# Patient Record
Sex: FEMALE | Race: ASIAN | NOT HISPANIC OR LATINO | Employment: OTHER | URBAN - METROPOLITAN AREA
[De-identification: names, ages, dates, MRNs, and addresses within clinical notes are randomized per-mention and may not be internally consistent; named-entity substitution may affect disease eponyms.]

---

## 2017-01-21 DIAGNOSIS — E11.9 TYPE 2 DIABETES MELLITUS WITHOUT COMPLICATION (H): ICD-10-CM

## 2017-01-21 LAB
CREAT UR-MCNC: 16 MG/DL
HBA1C MFR BLD: 6.7 % (ref 4.3–6)
MICROALBUMIN UR-MCNC: 50 MG/L
MICROALBUMIN/CREAT UR: 315 MG/G CR (ref 0–25)

## 2017-01-21 PROCEDURE — 36415 COLL VENOUS BLD VENIPUNCTURE: CPT | Performed by: INTERNAL MEDICINE

## 2017-01-21 PROCEDURE — 82043 UR ALBUMIN QUANTITATIVE: CPT | Performed by: INTERNAL MEDICINE

## 2017-01-21 PROCEDURE — 83036 HEMOGLOBIN GLYCOSYLATED A1C: CPT | Performed by: INTERNAL MEDICINE

## 2017-01-23 ENCOUNTER — OFFICE VISIT (OUTPATIENT)
Dept: INTERNAL MEDICINE | Facility: CLINIC | Age: 72
End: 2017-01-23
Payer: COMMERCIAL

## 2017-01-23 VITALS
BODY MASS INDEX: 21.07 KG/M2 | OXYGEN SATURATION: 96 % | SYSTOLIC BLOOD PRESSURE: 132 MMHG | HEIGHT: 61 IN | DIASTOLIC BLOOD PRESSURE: 64 MMHG | WEIGHT: 111.6 LBS | HEART RATE: 75 BPM | TEMPERATURE: 98.2 F

## 2017-01-23 DIAGNOSIS — M17.0 PRIMARY OSTEOARTHRITIS OF BOTH KNEES: ICD-10-CM

## 2017-01-23 DIAGNOSIS — R80.9 TYPE 2 DIABETES MELLITUS WITH MICROALBUMINURIA, WITHOUT LONG-TERM CURRENT USE OF INSULIN (H): Primary | ICD-10-CM

## 2017-01-23 DIAGNOSIS — E06.3 AUTOIMMUNE HYPOTHYROIDISM: ICD-10-CM

## 2017-01-23 DIAGNOSIS — E11.29 TYPE 2 DIABETES MELLITUS WITH MICROALBUMINURIA, WITHOUT LONG-TERM CURRENT USE OF INSULIN (H): Primary | ICD-10-CM

## 2017-01-23 PROCEDURE — 99214 OFFICE O/P EST MOD 30 MIN: CPT | Performed by: INTERNAL MEDICINE

## 2017-01-23 NOTE — MR AVS SNAPSHOT
After Visit Summary   1/23/2017    Melly Gan    MRN: [id number]           Patient Information     Date Of Birth          1945        Visit Information        Provider Department      1/23/2017 2:30 PM Braxton Boone MD; [facility] [facility]        Today's Diagnoses     Type 2 diabetes mellitus with microalbuminuria, without long-term current use of insulin (H)    -  1     Autoimmune hypothyroidism         Primary osteoarthritis of both knees           Care Instructions    For Osteoarthritis:  1. Try taking 2  Tablets of tylenol/acetaminophen twice daily           Follow-ups after your visit        Additional Services     ORTHOPEDICS ADULT REFERRAL       Your provider has referred you to: FMG: [facility] [phone]   [email or web address]  [facility] [place] [phone]   [email or web address]      Please be aware that coverage of these services is subject to the terms and limitations of your health insurance plan.  Call member services at your health plan with any benefit or coverage questions.      Please bring the following to your appointment:    >>   Any x-rays, CTs or MRIs which have been performed.  Contact the facility where they were done to arrange for  prior to your scheduled appointment.    >>   List of current medications   >>   This referral request   >>   Any documents/labs given to you for this referral                  Future tests that were ordered for you today     Open Future Orders        Priority Expected Expires Ordered    Basic metabolic panel Routine 7/22/2017 1/23/2018 1/23/2017    Hemoglobin A1c Routine 7/22/2017 1/23/2018 1/23/2017    ALT Routine 7/22/2017 1/23/2018 1/23/2017    Albumin Random Urine Quantitative Routine 7/23/2017 1/23/2018 1/23/2017  "   Lipid panel reflex to direct LDL Routine 2017    TSH with free T4 reflex Routine 2017            Who to contact     If you have questions or need follow up information about today's clinic visit or your schedule please contact [facility] directly at [phone].  Normal or non-critical lab and imaging results will be communicated to you by Dejamorhart, letter or phone within 4 business days after the clinic has received the results. If you do not hear from us within 7 days, please contact the clinic through Dejamorhart or phone. If you have a critical or abnormal lab result, we will notify you by phone as soon as possible.  Submit refill requests through [employer] or call your pharmacy and they will forward the refill request to us. Please allow 3 business days for your refill to be completed.          Additional Information About Your Visit        DejamorharCirclePublish Information     [employer] lets you send messages to your doctor, view your test results, renew your prescriptions, schedule appointments and more. To sign up, go to [email or web address] . Click on \"Log in\" on the left side of the screen, which will take you to the Welcome page. Then click on \"Sign up Now\" on the right side of the page.     You will be asked to enter the access code listed below, as well as some personal information. Please follow the directions to create your username and password.     Your access code is: [email or web address]  Expires: 2017  3:33 PM     Your access code will  in 90 days. If you need help or a new code, please call your [place] clinic or [phone].        Care EveryWhere ID     This is your Care EveryWhere ID. This could be used by other organizations to access your [place] medical records  [id number]        Your Vitals Were     Pulse Temperature Height BMI (Body Mass Index) Pulse Oximetry       75 98.2  F (36.8  C) (Oral) 5' 1\" (1.549 m) 21.10 kg/m2 " 96%        Blood Pressure from Last 3 Encounters:   01/23/17 132/64   09/23/16 128/60   06/24/16 120/40    Weight from Last 3 Encounters:   01/23/17 111 lb 9.6 oz (50.621 kg)   09/23/16 108 lb 11.2 oz (49.306 kg)   06/24/16 105 lb 8 oz (47.854 kg)              We Performed the Following     ORTHOPEDICS ADULT REFERRAL        Primary Care Provider Office Phone # Fax #    Braxton Boone [phone]       [facility] [address] [facility] [address]        Thank you!     Thank you for choosing [facility]  for your care. Our goal is always to provide you with excellent care. Hearing back from our patients is one way we can continue to improve our services. Please take a few minutes to complete the written survey that you may receive in the mail after your visit with us. Thank you!             Your Updated Medication List - Protect others around you: Learn how to safely use, store and throw away your medicines at www.disposemymeds.org.          This list is accurate as of: 1/23/17  3:33 PM.  Always use your most recent med list.                   Brand Name Dispense Instructions for use    albuterol 108 (90 BASE) MCG/ACT Inhaler    PROAIR HFA/PROVENTIL HFA/VENTOLIN HFA    1 Inhaler    Inhale 2 puffs into the lungs every 6 hours as needed for shortness of breath / dyspnea or wheezing       amLODIPine 10 MG tablet    NORVASC    90 tablet    Take 1 tablet (10 mg) by mouth daily       aspirin 81 MG tablet      Take  by mouth daily.       calcium 600 MG tablet      Take 1 tablet by mouth 2 times daily       cholecalciferol 1000 UNIT tablet    vitamin D     5 TABLETS DAILY       fluticasone 110 MCG/ACT Inhaler    FLOVENT HFA    3 Inhaler    Inhale 1-2 puffs into the lungs 2 times daily       hydrocortisone 0.2 % cream    WESTCORT    45 g    Apply sparingly to affected area two times daily as needed.       levothyroxine 88 MCG tablet    SYNTHROID/LEVOTHROID    90  tablet    Take 1 tablet (88 mcg) by mouth daily       losartan 100 MG tablet    COZAAR    90 tablet    Take 1 tablet (100 mg) by mouth daily       metFORMIN 500 MG 24 hr tablet    GLUCOPHAGE-XR    360 tablet    Take 4 tablets (2,000 mg) by mouth daily (with breakfast) (OR 2 TABLETS IN AM AND 2 IN PM)       MULTIVITAMIN TABS   OR      1 TABLET DAILY       simvastatin 10 MG tablet    ZOCOR    90 tablet    Take 1 tablet (10 mg) by mouth At Bedtime       traZODone 100 MG tablet    DESYREL    90 tablet    Take 1 tablet (100 mg) by mouth nightly as needed for sleep

## 2017-01-23 NOTE — PROGRESS NOTES
"  SUBJECTIVE:                                                    Gregoria Gan is a 71 year old female who presents to clinic today for the following health issues:    Diabetes Follow-up  A1C      6.7   1/21/2017  A1C      6.8   6/21/2016  A1C      6.7   10/24/2015  A1C      6.8   3/21/2015  A1C      6.9   8/9/2014   Patient is checking blood sugars: once daily.  Results are as follows:         am - 86 - 120    Diabetic concerns: None     Symptoms of hypoglycemia (low blood sugar): none     Paresthesias (numbness or burning in feet) or sores: No     Date of last diabetic eye exam:  A year ago       Amount of exercise or physical activity: None    Problems taking medications regularly: No    Medication side effects: none    Diet: regular (no restrictions)    She also states she felt SOB for approx 2 wks, starting 3 wks ago and resolving 1 wk ago. She isn't able to tell me much more than this.  No infectious sx. No cough, no exertional symptoms as well.    Problem list and histories reviewed & adjusted, as indicated.  Additional history: as documented    Problem list, Medication list, Allergies, and Medical/Social/Surgical histories reviewed in EPIC and updated as appropriate.    ROS:  Constitutional, HEENT, cardiovascular, pulmonary, gi and gu systems are negative, except as otherwise noted.    OBJECTIVE:                                                    /64 mmHg  Pulse 75  Temp(Src) 98.2  F (36.8  C) (Oral)  Ht 5' 1\" (1.549 m)  Wt 111 lb 9.6 oz (50.621 kg)  BMI 21.10 kg/m2  SpO2 96%  Body mass index is 21.1 kg/(m^2).  GENERAL APPEARANCE: alert and no distress  HENT: nose and mouth without ulcers or lesions  NECK: no adenopathy, no asymmetry, masses, or scars and thyroid normal to palpation  RESP: lungs clear to auscultation - no rales, rhonchi or wheezes  CV: regular rates and rhythm, normal S1 S2, no S3 or S4 and no murmur, click or rub  MS: extremities normal- no gross deformities noted. Pain in " both knees with movement  SKIN: no suspicious lesions or rashes  DIABETIC FOOT EXAM: normal DP and PT pulses, no trophic changes or ulcerative lesions, normal sensory exam and normal monofilament exam    Diagnostic test results:  Results for orders placed or performed in visit on 01/21/17   Hemoglobin A1c   Result Value Ref Range    Hemoglobin A1C 6.7 (H) 4.3 - 6.0 %   Microalbumin quantitative random urine   Result Value Ref Range    Creatinine Urine 16 mg/dL    Albumin Urine mg/L 50 mg/L    Albumin Urine mg/g Cr 315.00 (H) 0 - 25 mg/g Cr        TSH   Date Value Ref Range Status   06/24/2016 0.47 0.40 - 4.00 mU/L Final   ]   ASSESSMENT/PLAN:                                                    1. Type 2 diabetes mellitus with microalbuminuria, without long-term current use of insulin (H)  Control is stable. Continue w/present regimen /plan   Monitor BP. Continue aCEi   - Basic metabolic panel; Future  - Hemoglobin A1c; Future  - ALT; Future  - Albumin Random Urine Quantitative; Future  - Lipid panel reflex to direct LDL; Future    2. Autoimmune hypothyroidism  Continue current dose  - TSH with free T4 reflex; Future    3. Primary osteoarthritis of both knees  - ORTHOPEDICS ADULT REFERRAL      See Patient Instructions    Braxton Boone MD  [facility]

## 2017-01-23 NOTE — NURSING NOTE
"Chief Complaint   Patient presents with     Diabetes       Initial /64 mmHg  Pulse 75  Temp(Src) 98.2  F (36.8  C) (Oral)  Ht 5' 1\" (1.549 m)  Wt 111 lb 9.6 oz (50.621 kg)  BMI 21.10 kg/m2  SpO2 96% Estimated body mass index is 21.1 kg/(m^2) as calculated from the following:    Height as of this encounter: 5' 1\" (1.549 m).    Weight as of this encounter: 111 lb 9.6 oz (50.621 kg).  BP completed using cuff size: regular    "

## 2017-03-07 DIAGNOSIS — E11.9 TYPE 2 DIABETES MELLITUS WITHOUT COMPLICATION (H): ICD-10-CM

## 2017-03-08 RX ORDER — METFORMIN HCL 500 MG
2000 TABLET, EXTENDED RELEASE 24 HR ORAL
Qty: 360 TABLET | Refills: 1 | Status: SHIPPED | OUTPATIENT
Start: 2017-03-08 | End: 2017-09-04

## 2017-03-08 NOTE — TELEPHONE ENCOUNTER
metFORMIN (GLUCOPHAGE-XR) 500 MG          Last Written Prescription Date: 8/22/16  Last Fill Quantity: 360, # refills: 1  [place] [facility] or [facility] prescribing provider:  1/23/17        BP Readings from Last 3 Encounters:   01/23/17 132/64   09/23/16 128/60   06/24/16 120/40     Lab Results   Component Value Date    MICROL 50 01/21/2017     No results found for: MICROALBUMIN  Creatinine   Date Value Ref Range Status   06/21/2016 1.18 (H) 0.52 - 1.04 mg/dL Final   ]  GFR Estimate   Date Value Ref Range Status   06/21/2016 45 (L) >60 mL/min/1.7m2 Final     Comment:     Non  GFR Calc   03/21/2015 56 (L) >60 mL/min/1.7m2 Final     Comment:     Non  GFR Calc   02/08/2014 47 (L) >60 mL/min/1.7m2 Final     GFR Estimate If Black   Date Value Ref Range Status   06/21/2016 54 (L) >60 mL/min/1.7m2 Final     Comment:      GFR Calc   03/21/2015 68 >60 mL/min/1.7m2 Final     Comment:      GFR Calc   02/08/2014 57 (L) >60 mL/min/1.7m2 Final     Lab Results   Component Value Date    CHOL 128 06/21/2016     Lab Results   Component Value Date    HDL 45 06/21/2016     Lab Results   Component Value Date    LDL 31 06/21/2016     Lab Results   Component Value Date    TRIG 258 06/21/2016     Lab Results   Component Value Date    CHOLHDLRATIO 2.8 03/21/2015     Lab Results   Component Value Date    AST 40 02/02/2013     Lab Results   Component Value Date    ALT 29 06/21/2016     Lab Results   Component Value Date    A1C 6.7 01/21/2017    A1C 6.8 06/21/2016    A1C 6.7 10/24/2015    A1C 6.8 03/21/2015    A1C 6.9 08/09/2014     Potassium   Date Value Ref Range Status   06/21/2016 4.3 3.4 - 5.3 mmol/L Final

## 2017-03-28 ENCOUNTER — OFFICE VISIT (OUTPATIENT)
Dept: INTERNAL MEDICINE | Facility: CLINIC | Age: 72
End: 2017-03-28
Payer: COMMERCIAL

## 2017-03-28 VITALS
OXYGEN SATURATION: 96 % | SYSTOLIC BLOOD PRESSURE: 124 MMHG | WEIGHT: 110.4 LBS | HEART RATE: 78 BPM | TEMPERATURE: 98 F | BODY MASS INDEX: 20.86 KG/M2 | DIASTOLIC BLOOD PRESSURE: 56 MMHG

## 2017-03-28 DIAGNOSIS — R21 MACULOPAPULAR RASH: Primary | ICD-10-CM

## 2017-03-28 PROCEDURE — 99213 OFFICE O/P EST LOW 20 MIN: CPT | Performed by: INTERNAL MEDICINE

## 2017-03-28 NOTE — NURSING NOTE
"Chief Complaint   Patient presents with     Derm Problem       Initial /56  Pulse 78  Temp 98  F (36.7  C) (Oral)  Wt 110 lb 6.4 oz (50.1 kg)  SpO2 96%  BMI 20.86 kg/m2 Estimated body mass index is 20.86 kg/(m^2) as calculated from the following:    Height as of 1/23/17: 5' 1\" (1.549 m).    Weight as of this encounter: 110 lb 6.4 oz (50.1 kg).  Medication Reconciliation: complete    "

## 2017-03-28 NOTE — MR AVS SNAPSHOT
After Visit Summary   3/28/2017    Melly Gan    MRN: [id number]           Patient Information     Date Of Birth          1945        Visit Information        Provider Department      3/28/2017 9:30 AM Braxton Boone MD; [facility] [employer] [facility]        Today's Diagnoses     Maculopapular rash    -  1       Follow-ups after your visit        Additional Services     DERMATOLOGY REFERRAL       Your provider has referred you to: FMG: [facility] [phone]   [email or web address]    Please be aware that coverage of these services is subject to the terms and limitations of your health insurance plan.  Call member services at your health plan with any benefit or coverage questions.      Please bring the following with you to your appointment:    (1) Any X-Rays, CTs or MRIs which have been performed.  Contact the facility where they were done to arrange for  prior to your scheduled appointment.    (2) List of current medications  (3) This referral request   (4) Any documents/labs given to you for this referral                  Your next 10 appointments already scheduled     Jul 20, 2017  9:30 AM CDT   LAB with [facility]   [facility]    [address] [address] [phone]           Patient must bring picture ID.  Patient should be prepared to give a urine specimen  Please do not eat 10-12 hours before your appointment if you are coming in fasting for labs on lipids, cholesterol, or glucose (sugar).  Pregnant women should follow their Care Team instructions. Water with medications is okay. Do not drink coffee or other fluids.   If you have concerns about taking  your medications, please ask at office or if scheduling via [employer], send a message by clicking on Secure Messaging, Message Your Care  "Team.            2017  2:40 PM CDT   Office Visit with Braxton Boone MD   [facility]    [address] [address] [phone]           Bring a current list of meds and any records pertaining to this visit.  For Physicals, please bring immunization records and any forms needing to be filled out.  Please arrive 10 minutes early to complete paperwork.              Who to contact     If you have questions or need follow up information about today's clinic visit or your schedule please contact [facility] directly at [phone].  Normal or non-critical lab and imaging results will be communicated to you by MyChart, letter or phone within 4 business days after the clinic has received the results. If you do not hear from us within 7 days, please contact the clinic through [employer] or phone. If you have a critical or abnormal lab result, we will notify you by phone as soon as possible.  Submit refill requests through Zingku or call your pharmacy and they will forward the refill request to us. Please allow 3 business days for your refill to be completed.          Additional Information About Your Visit        MyChart Information     Zingku lets you send messages to your doctor, view your test results, renew your prescriptions, schedule appointments and more. To sign up, go to [email or web address] . Click on \"Log in\" on the left side of the screen, which will take you to the Welcome page. Then click on \"Sign up Now\" on the right side of the page.     You will be asked to enter the access code listed below, as well as some personal information. Please follow the directions to create your username and password.     Your access code is: [email or web address]  Expires: 2017  4:33 PM     Your access code will  in 90 days. If you need help or a new code, please call your [facility] or " [phone].        Care EveryWhere ID     This is your Care EveryWhere ID. This could be used by other organizations to access your [place] medical records  [id number]        Your Vitals Were     Pulse Temperature Pulse Oximetry BMI (Body Mass Index)          78 98  F (36.7  C) (Oral) 96% 20.86 kg/m2         Blood Pressure from Last 3 Encounters:   03/28/17 124/56   01/23/17 132/64   09/23/16 128/60    Weight from Last 3 Encounters:   03/28/17 110 lb 6.4 oz (50.1 kg)   01/23/17 111 lb 9.6 oz (50.6 kg)   09/23/16 108 lb 11.2 oz (49.3 kg)              We Performed the Following     DERMATOLOGY REFERRAL          Today's Medication Changes          These changes are accurate as of: 3/28/17 10:23 AM.  If you have any questions, ask your nurse or doctor.               Stop taking these medicines if you haven't already. Please contact your care team if you have questions.     albuterol 108 (90 BASE) MCG/ACT Inhaler   Commonly known as:  PROAIR HFA/PROVENTIL HFA/VENTOLIN HFA   Stopped by:  Braxton Boone MD           fluticasone 110 MCG/ACT Inhaler   Commonly known as:  FLOVENT HFA   Stopped by:  Braxton Boone MD           hydrocortisone 0.2 % cream   Commonly known as:  WESTCORT   Stopped by:  Braxton Boone MD                    Primary Care Provider Office Phone # Fax #    Braxton Boone [phone]       [facility] [address] [facility] [address]        Thank you!     Thank you for choosing [facility]  for your care. Our goal is always to provide you with excellent care. Hearing back from our patients is one way we can continue to improve our services. Please take a few minutes to complete the written survey that you may receive in the mail after your visit with us. Thank you!             Your Updated Medication List - Protect others around you: Learn how to safely use, store and throw away your medicines at www.disposemymeds.org.           This list is accurate as of: 3/28/17 10:23 AM.  Always use your most recent med list.                   Brand Name Dispense Instructions for use    amLODIPine 10 MG tablet    NORVASC    90 tablet    Take 1 tablet (10 mg) by mouth daily       aspirin 81 MG tablet      Take  by mouth daily.       calcium 600 MG tablet      Take 1 tablet by mouth 2 times daily       cholecalciferol 1000 UNIT tablet    vitamin D     5 TABLETS DAILY       levothyroxine 88 MCG tablet    SYNTHROID/LEVOTHROID    90 tablet    Take 1 tablet (88 mcg) by mouth daily       losartan 100 MG tablet    COZAAR    90 tablet    Take 1 tablet (100 mg) by mouth daily       metFORMIN 500 MG 24 hr tablet    GLUCOPHAGE-XR    360 tablet    Take 4 tablets (2,000 mg) by mouth daily (with breakfast) (OR 2 TABLETS IN AM AND 2 IN PM)       MULTIVITAMIN TABS   OR      1 TABLET DAILY       simvastatin 10 MG tablet    ZOCOR    90 tablet    Take 1 tablet (10 mg) by mouth At Bedtime       traZODone 100 MG tablet    DESYREL    90 tablet    Take 1 tablet (100 mg) by mouth nightly as needed for sleep

## 2017-03-28 NOTE — PROGRESS NOTES
SUBJECTIVE:                                                    Gregoria Gan is a 72 year old female who presents to clinic today for the following health issues:    Rash     Onset: 3 weeks    Description:   Location: legs  Character: round, blotchy  Itching (Pruritis): no     Progression of Symptoms:  same    Accompanying Signs & Symptoms:  Fever: no   Body aches or joint pain: no   Sore throat symptoms: no   Recent cold symptoms: no    History:   Previous similar rash: no     Precipitating factors:   Exposure to similar rash: no   New exposures: None   Recent travel: no     Alleviating factors:  none     Therapies Tried and outcome: tried allergy medication for 3 days but didn't help      Problem list and histories reviewed & adjusted, as indicated.  Additional history: as documented    Labs reviewed in EPIC    Reviewed and updated as needed this visit by clinical staff  Allergies  Meds       Reviewed and updated as needed this visit by Provider       ROS:  Constitutional, HEENT, cardiovascular, pulmonary, gi and gu systems are negative, except as otherwise noted.    OBJECTIVE:                                                    /56  Pulse 78  Temp 98  F (36.7  C) (Oral)  Wt 110 lb 6.4 oz (50.1 kg)  SpO2 96%  BMI 20.86 kg/m2  Body mass index is 20.86 kg/(m^2).  GENERAL APPEARANCE: healthy, alert and no distress  SKIN:  Very faint macular spots on lower legs, keren with pressure.   Diagnostic test results:  none      ASSESSMENT/PLAN:                                                    1. Maculopapular rash  She reports these are becoming more faint and are not symptomatic. Therefore I encouraged her to jsut monitor for a month and then if still around she could ocnsider seeing derm  - DERMATOLOGY REFERRAL      Follow up with Provider - as prev scheduled     Braxton Boone MD  [facility]

## 2017-04-27 ENCOUNTER — OFFICE VISIT (OUTPATIENT)
Dept: DERMATOLOGY | Facility: CLINIC | Age: 72
End: 2017-04-27
Payer: COMMERCIAL

## 2017-04-27 VITALS — SYSTOLIC BLOOD PRESSURE: 135 MMHG | DIASTOLIC BLOOD PRESSURE: 60 MMHG | HEART RATE: 77 BPM | OXYGEN SATURATION: 100 %

## 2017-04-27 DIAGNOSIS — D48.5 NEOPLASM OF UNCERTAIN BEHAVIOR OF SKIN: ICD-10-CM

## 2017-04-27 DIAGNOSIS — L30.9 ACUTE DERMATITIS: Primary | ICD-10-CM

## 2017-04-27 PROCEDURE — 11100 HC BIOPSY SKIN/SUBQ/MUC MEM, SINGLE LESION: CPT | Performed by: PHYSICIAN ASSISTANT

## 2017-04-27 PROCEDURE — 88305 TISSUE EXAM BY PATHOLOGIST: CPT | Performed by: PHYSICIAN ASSISTANT

## 2017-04-27 PROCEDURE — 99202 OFFICE O/P NEW SF 15 MIN: CPT | Mod: 25 | Performed by: PHYSICIAN ASSISTANT

## 2017-04-27 PROCEDURE — 00000093 ZZHCL STATISTIC COURTESY CONSULT: Performed by: PHYSICIAN ASSISTANT

## 2017-04-27 RX ORDER — TRIAMCINOLONE ACETONIDE 1 MG/G
CREAM TOPICAL
Qty: 454 G | Refills: 1 | Status: SHIPPED | OUTPATIENT
Start: 2017-04-27 | End: 2017-05-25

## 2017-04-27 RX ORDER — PREDNISONE 10 MG/1
TABLET ORAL
Qty: 15 TABLET | Refills: 0 | Status: SHIPPED | OUTPATIENT
Start: 2017-04-27 | End: 2017-07-24

## 2017-04-27 NOTE — MR AVS SNAPSHOT
After Visit Summary   4/27/2017    Melly Gan    MRN: [id number]           Patient Information     Date Of Birth          1945        Visit Information        Provider Department      4/27/2017 10:30 AM Sera Saravia PA-C; [employer] [employer] [facility]        Today's Diagnoses     Acute dermatitis    -  1      Care Instructions      Wound Care Instructions     FOR SUPERFICIAL WOUNDS     [facility] [phone]                       AFTER 24 HOURS YOU SHOULD REMOVE THE BANDAGE AND BEGIN DAILY DRESSING CHANGES AS FOLLOWS:     1) Remove Dressing.     2) Clean and dry the area with tap water using a Q-tip or sterile gauze pad.     3) Apply Vaseline, Aquaphor, Polysporin ointment or Bacitracin ointment over entire wound.  Do NOT use Neosporin ointment.     4) Cover the wound with a band-aid, or a sterile non-stick gauze pad and micropore paper tape      REPEAT THESE INSTRUCTIONS AT LEAST ONCE A DAY UNTIL THE WOUND HAS COMPLETELY HEALED.    It is an old wives tale that a wound heals better when it is exposed to air and allowed to dry out. The wound will heal faster with a better cosmetic result if it is kept moist with ointment and covered with a bandage.    **Do not let the wound dry out.**      Supplies Needed:      *Cotton tipped applicators (Q-tips)    *Polysporin Ointment or Bacitracin Ointment (NOT NEOSPORIN)    *Band-aids or non-stick gauze pads and micropore paper tape.      PATIENT INFORMATION:    During the healing process you will notice a number of changes. All wounds develop a small halo of redness surrounding the wound.  This means healing is occurring. Severe itching with extensive redness usually indicates sensitivity to the ointment or bandage tape used to dress the wound.  You should call our office if this develops.      Swelling  and/or discoloration around your surgical site is common, particularly when performed around  the eye.    All wounds normally drain.  The larger the wound the more drainage there will be.  After 7-10 days, you will notice the wound beginning to shrink and new skin will begin to grow.  The wound is healed when you can see skin has formed over the entire area.  A healed wound has a healthy, shiny look to the surface and is red to dark pink in color to normalize.  Wounds may take approximately 4-6 weeks to heal.  Larger wounds may take 6-8 weeks.  After the wound is healed you may discontinue dressing changes.    You may experience a sensation of tightness as your wound heals. This is normal and will gradually subside.    Your healed wound may be sensitive to temperature changes. This sensitivity improves with time, but if you re having a lot of discomfort, try to avoid temperature extremes.    Patients frequently experience itching after their wound appears to have healed because of the continue healing under the skin.  Plain Vaseline will help relieve the itching.        POSSIBLE COMPLICATIONS    BLEEDIN. Leave the bandage in place.  2. Use tightly rolled up gauze or a cloth to apply direct pressure over the bandage for 30  minutes.  3. Reapply pressure for an additional 30 minutes if necessary  4. Use additional gauze and tape to maintain pressure once the bleeding has stopped.          Follow-ups after your visit        Your next 10 appointments already scheduled     2017  9:30 AM CDT   [facility]    [address] [address] [phone]           Patient must bring picture ID.  Patient should be prepared to give a urine specimen  Please do not eat 10-12 hours before your appointment if you are coming in fasting for labs on lipids, cholesterol, or glucose (sugar).  Pregnant women should follow their Care Team instructions. Water with medications is okay. Do not drink coffee or other  "fluids.   If you have concerns about taking  your medications, please ask at office or if scheduling via [employer], send a message by clicking on Secure Messaging, Message Your Care Team.            Jul 24, 2017  2:40 PM CDT   [facility] with Braxton Boone [facility]   [facility]    [address] [id number] [phone]           Bring a current list of meds and any records pertaining to this visit.  For Physicals, please bring immunization records and any forms needing to be filled out.  Please arrive 10 minutes early to complete paperwork.              Who to contact     If you have questions or need follow up information about today's clinic visit or your schedule please contact [facility] directly at [phone].  Normal or non-critical lab and imaging results will be communicated to you by MyChart, letter or phone within 4 business days after the clinic has received the results. If you do not hear from us within 7 days, please contact the clinic through [employer] or phone. If you have a critical or abnormal lab result, we will notify you by phone as soon as possible.  Submit refill requests through [employer] or call your pharmacy and they will forward the refill request to us. Please allow 3 business days for your refill to be completed.          Additional Information About Your Visit        [employer] Information     [employer] lets you send messages to your doctor, view your test results, renew your prescriptions, schedule appointments and more. To sign up, go to [email or web address] . Click on \"Log in\" on the left side of the screen, which will take you to the Welcome page. Then click on \"Sign up Now\" on the right side of the page.     You will be asked to enter the access code listed below, as well as some personal information. Please follow the directions to create your username and password.     Your " access code is: [email or web address]  Expires: 2017 11:18 AM     Your access code will  in 90 days. If you need help or a new code, please call your [place] clinic or [phone].        Care EveryWhere ID     This is your Care EveryWhere ID. This could be used by other organizations to access your [place] medical records  [id number]        Your Vitals Were     Pulse Pulse Oximetry                77 100%           Blood Pressure from Last 3 Encounters:   17 135/60   17 124/56   17 132/64    Weight from Last 3 Encounters:   17 50.1 kg (110 lb 6.4 oz)   17 50.6 kg (111 lb 9.6 oz)   16 49.3 kg (108 lb 11.2 oz)              Today, you had the following     No orders found for display         Today's Medication Changes          These changes are accurate as of: 17 11:18 AM.  If you have any questions, ask your nurse or doctor.               Start taking these medicines.        Dose/Directions    predniSONE 10 MG tablet   Commonly known as:  DELTASONE   Used for:  Acute dermatitis   Started by:  Sera Saravia PA-C        3 tabs PO QAM x 5 days   Quantity:  15 tablet   Refills:  0       triamcinolone 0.1 % cream   Commonly known as:  KENALOG   Used for:  Acute dermatitis   Started by:  Sera Saravia PA-C        Apply to legs BID x 1-2 weeks then PRN   Quantity:  454 g   Refills:  1            Where to get your medicines      These medications were sent to [id number] - [facility] [address] [place] [address]     Phone:  [phone]     predniSONE 10 MG tablet    triamcinolone 0.1 % cream                Primary Care Provider Office Phone # Fax #    Braxton Boone [phone]       [facility] [address] [facility] [address]        Thank you!     Thank you for choosing [facility]  for your care. Our goal is always to provide you with excellent  care. Hearing back from our patients is one way we can continue to improve our services. Please take a few minutes to complete the written survey that you may receive in the mail after your visit with us. Thank you!             Your Updated Medication List - Protect others around you: Learn how to safely use, store and throw away your medicines at www.disposemymeds.org.          This list is accurate as of: 4/27/17 11:18 AM.  Always use your most recent med list.                   Brand Name Dispense Instructions for use    amLODIPine 10 MG tablet    NORVASC    90 tablet    Take 1 tablet (10 mg) by mouth daily       aspirin 81 MG tablet      Take  by mouth daily.       calcium 600 MG tablet      Take 1 tablet by mouth 2 times daily       cholecalciferol 1000 UNIT tablet    vitamin D     5 TABLETS DAILY       levothyroxine 88 MCG tablet    SYNTHROID/LEVOTHROID    90 tablet    Take 1 tablet (88 mcg) by mouth daily       losartan 100 MG tablet    COZAAR    90 tablet    Take 1 tablet (100 mg) by mouth daily       metFORMIN 500 MG 24 hr tablet    GLUCOPHAGE-XR    360 tablet    Take 4 tablets (2,000 mg) by mouth daily (with breakfast) (OR 2 TABLETS IN AM AND 2 IN PM)       MULTIVITAMIN TABS   OR      1 TABLET DAILY       predniSONE 10 MG tablet    DELTASONE    15 tablet    3 tabs PO QAM x 5 days       simvastatin 10 MG tablet    ZOCOR    90 tablet    Take 1 tablet (10 mg) by mouth At Bedtime       traZODone 100 MG tablet    DESYREL    90 tablet    Take 1 tablet (100 mg) by mouth nightly as needed for sleep       triamcinolone 0.1 % cream    KENALOG    454 g    Apply to legs BID x 1-2 weeks then PRN

## 2017-04-27 NOTE — PROGRESS NOTES
HPI:   Gregoria Gan is a 72 year old female who presents for evaluation of a rash on her lower legs  chief complaint  Location: bilateral lower legs - started around her ankles and has spread proximally   Condition present for:  About 3 months.   Previous treatments include: none    Review Of Systems  Eyes: negative  Ears/Nose/Throat: negative  Respiratory: No shortness of breath, dyspnea on exertion, cough, or hemoptysis  Cardiovascular: negative  Gastrointestinal: negative  Genitourinary: negative  Musculoskeletal: negative  Neurologic: negative  Psychiatric: negative        PHYSICAL EXAM:      Skin exam performed as follows: Type 3 skin. Mood appropriate  Alert and Oriented X 3. Well developed, well nourished in no distress.  General appearance: Normal  Head including face: Normal  Eyes: conjunctiva and lids: Normal  Mouth: Lips, teeth, gums: Normal  Neck: Normal  Chest-breast/axillae: Normal  Back: Normal  Spleen and liver: Normal  Cardiovascular: Exam of peripheral vascular system by observation for swelling, varicosities, edema: Normal  Genitalia: groin, buttocks: Normal  Extremities: digits/nails (clubbing): Normal  Eccrine and Apocrine glands: Normal  Right upper extremity: Normal  Left upper extremity: Normal  Right lower extremity: Normal  Left lower extremity: Normal  Skin: Scalp and body hair: See below    1. Pink and tan round subcutaneous patches on bilateral legs    ASSESSMENT/PLAN:     1. 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 Amenable to both PO and topicals. Eruption is neither painful nor pruritic and the color in the lower lesions is starting to fade.   --Deep Shave bx in typical fashion from left upper thigh .  Area cleaned with betadyne and anesthetized with 1% lidocaine with epi .  Dermablade used to remove the lesion and sent to pathology. Bleeding was cauterized. Pt tolerated procedure well.  --Start prednisone 30 mg QAM x 5  days. Ok to take Benadryl at night if needed for sleep.   --Start TAC 0.1% cream BID x 1-2 weeks then PRN        Follow-up: 3-4 weeks/PRN sooner  CC:   Scribed By: Sera Saravia, MS, PA-C

## 2017-04-27 NOTE — NURSING NOTE
"Initial /60  Pulse 77  SpO2 100% Estimated body mass index is 20.86 kg/(m^2) as calculated from the following:    Height as of 1/23/17: 1.549 m (5' 1\").    Weight as of 3/28/17: 50.1 kg (110 lb 6.4 oz). .      "

## 2017-04-27 NOTE — PATIENT INSTRUCTIONS
Wound Care Instructions     FOR SUPERFICIAL WOUNDS     [facility] [phone]                       AFTER 24 HOURS YOU SHOULD REMOVE THE BANDAGE AND BEGIN DAILY DRESSING CHANGES AS FOLLOWS:     1) Remove Dressing.     2) Clean and dry the area with tap water using a Q-tip or sterile gauze pad.     3) Apply Vaseline, Aquaphor, Polysporin ointment or Bacitracin ointment over entire wound.  Do NOT use Neosporin ointment.     4) Cover the wound with a band-aid, or a sterile non-stick gauze pad and micropore paper tape      REPEAT THESE INSTRUCTIONS AT LEAST ONCE A DAY UNTIL THE WOUND HAS COMPLETELY HEALED.    It is an old wives tale that a wound heals better when it is exposed to air and allowed to dry out. The wound will heal faster with a better cosmetic result if it is kept moist with ointment and covered with a bandage.    **Do not let the wound dry out.**      Supplies Needed:      *Cotton tipped applicators (Q-tips)    *Polysporin Ointment or Bacitracin Ointment (NOT NEOSPORIN)    *Band-aids or non-stick gauze pads and micropore paper tape.      PATIENT INFORMATION:    During the healing process you will notice a number of changes. All wounds develop a small halo of redness surrounding the wound.  This means healing is occurring. Severe itching with extensive redness usually indicates sensitivity to the ointment or bandage tape used to dress the wound.  You should call our office if this develops.      Swelling  and/or discoloration around your surgical site is common, particularly when performed around the eye.    All wounds normally drain.  The larger the wound the more drainage there will be.  After 7-10 days, you will notice the wound beginning to shrink and new skin will begin to grow.  The wound is healed when you can see skin has formed over the entire area.  A healed wound has a healthy, shiny look to the surface and is red to dark pink in color to normalize.  Wounds may take  approximately 4-6 weeks to heal.  Larger wounds may take 6-8 weeks.  After the wound is healed you may discontinue dressing changes.    You may experience a sensation of tightness as your wound heals. This is normal and will gradually subside.    Your healed wound may be sensitive to temperature changes. This sensitivity improves with time, but if you re having a lot of discomfort, try to avoid temperature extremes.    Patients frequently experience itching after their wound appears to have healed because of the continue healing under the skin.  Plain Vaseline will help relieve the itching.        POSSIBLE COMPLICATIONS    BLEEDIN. Leave the bandage in place.  2. Use tightly rolled up gauze or a cloth to apply direct pressure over the bandage for 30  minutes.  3. Reapply pressure for an additional 30 minutes if necessary  4. Use additional gauze and tape to maintain pressure once the bleeding has stopped.

## 2017-05-02 ENCOUNTER — HOSPITAL PATHOLOGY (OUTPATIENT)
Dept: OTHER | Facility: CLINIC | Age: 72
End: 2017-05-02

## 2017-05-03 LAB — COPATH REPORT: NORMAL

## 2017-05-04 LAB — COPATH REPORT: NORMAL

## 2017-05-25 ENCOUNTER — OFFICE VISIT (OUTPATIENT)
Dept: DERMATOLOGY | Facility: CLINIC | Age: 72
End: 2017-05-25
Payer: COMMERCIAL

## 2017-05-25 VITALS — SYSTOLIC BLOOD PRESSURE: 146 MMHG | HEART RATE: 87 BPM | OXYGEN SATURATION: 97 % | DIASTOLIC BLOOD PRESSURE: 68 MMHG

## 2017-05-25 DIAGNOSIS — L23.9 DERMAL HYPERSENSITIVITY REACTION: Primary | ICD-10-CM

## 2017-05-25 DIAGNOSIS — L30.9 ACUTE DERMATITIS: ICD-10-CM

## 2017-05-25 PROCEDURE — 99212 OFFICE O/P EST SF 10 MIN: CPT | Performed by: PHYSICIAN ASSISTANT

## 2017-05-25 RX ORDER — TRIAMCINOLONE ACETONIDE 1 MG/G
CREAM TOPICAL
Qty: 454 G | Refills: 1 | Status: SHIPPED | OUTPATIENT
Start: 2017-05-25 | End: 2017-07-24

## 2017-05-25 NOTE — NURSING NOTE
"Initial /68  Pulse 87  SpO2 97% Estimated body mass index is 20.86 kg/(m^2) as calculated from the following:    Height as of 1/23/17: 1.549 m (5' 1\").    Weight as of 3/28/17: 50.1 kg (110 lb 6.4 oz). .      "

## 2017-05-25 NOTE — PROGRESS NOTES
HPI:   Gregoria Gan is a 72 year old female who presents for recheck of rash on legs - much improved on current regimen  chief complaint  Location: bilateral lower legs - started around her ankles and has spread proximally   Condition present for:  About 3 months.   Previous treatments include: TAC cream, prednisone    Review Of Systems  Eyes: negative  Ears/Nose/Throat: negative  Respiratory: No shortness of breath, dyspnea on exertion, cough, or hemoptysis  Cardiovascular: negative  Gastrointestinal: negative  Genitourinary: negative  Musculoskeletal: negative  Neurologic: negative  Psychiatric: negative        PHYSICAL EXAM:      Skin exam performed as follows: Type 3 skin. Mood appropriate  Alert and Oriented X 3. Well developed, well nourished in no distress.  General appearance: Normal  Head including face: Normal  Eyes: conjunctiva and lids: Normal  Mouth: Lips, teeth, gums: Normal  Neck: Normal  Chest-breast/axillae: Normal  Back: Normal  Spleen and liver: Normal  Cardiovascular: Exam of peripheral vascular system by observation for swelling, varicosities, edema: Normal  Genitalia: groin, buttocks: Normal  Extremities: digits/nails (clubbing): Normal  Eccrine and Apocrine glands: Normal  Right upper extremity: Normal  Left upper extremity: Normal  Right lower extremity: Normal  Left lower extremity: Normal  Skin: Scalp and body hair: See below    1. Legs clear    ASSESSMENT/PLAN:     1.   Biopsy proven dermal hypersensitivity reaction - much improved on current regimen today. Has been using TAC cream sparingly. Having allergic contact dermatitis to the bandage adhesive - advised to d/c this and to start paper tape or bandages for sensitive skin.   --Continue vaseline to biopsy site and cover with a bandage  --Stop TAC 0.1% cream for now; may resume if never needed in the future.         Follow-up: PRN  CC:   Scribed By: Sera Saravia, MS, PA-C

## 2017-05-25 NOTE — MR AVS SNAPSHOT
After Visit Summary   5/25/2017    Gregoria Gan    MRN: [id number]           Patient Information     Date Of Birth          1945        Visit Information        Provider Department      5/25/2017 2:15 PM Sera Saravia PA-C; [facility] [employer] [facility]        Today's Diagnoses     Dermal hypersensitivity reaction    -  1    Acute dermatitis           Follow-ups after your visit        Your next 10 appointments already scheduled     Jul 20, 2017  9:30 AM CDT   LAB with TRUNG LAB   [facility] ([facility])    [address] [place] [address] [phone]           Patient must bring picture ID.  Patient should be prepared to give a urine specimen  Please do not eat 10-12 hours before your appointment if you are coming in fasting for labs on lipids, cholesterol, or glucose (sugar).  Pregnant women should follow their Care Team instructions. Water with medications is okay. Do not drink coffee or other fluids.   If you have concerns about taking  your medications, please ask at office or if scheduling via [employer], send a message by clicking on Secure Messaging, Message Your Care Team.            Jul 24, 2017  2:40 PM CDT   Office Visit with Braxton Boone MD   [facility] ([facility])    [address] [place] [address] [phone]           Bring a current list of meds and any records pertaining to this visit.  For Physicals, please bring immunization records and any forms needing to be filled out.  Please arrive 10 minutes early to complete paperwork.              Who to contact     If you have questions or need follow up information about today's clinic visit or your schedule please contact [facility] directly at [phone].  Normal or non-critical lab and imaging results will be communicated  "to you by [employer], letter or phone within 4 business days after the clinic has received the results. If you do not hear from us within 7 days, please contact the clinic through [employer] or phone. If you have a critical or abnormal lab result, we will notify you by phone as soon as possible.  Submit refill requests through [employer] or call your pharmacy and they will forward the refill request to us. Please allow 3 business days for your refill to be completed.          Additional Information About Your Visit        [employer] Communications[employer]iffin Hospital[employer] Information     [employer] lets you send messages to your doctor, view your test results, renew your prescriptions, schedule appointments and more. To sign up, go to [email or web address] . Click on \"Log in\" on the left side of the screen, which will take you to the Welcome page. Then click on \"Sign up Now\" on the right side of the page.     You will be asked to enter the access code listed below, as well as some personal information. Please follow the directions to create your username and password.     Your access code is: [email or web address]  Expires: 2017 11:18 AM     Your access code will  in 90 days. If you need help or a new code, please call your [place] clinic or [phone].        Care EveryWhere ID     This is your Care EveryWhere ID. This could be used by other organizations to access your [place] medical records  [id number]        Your Vitals Were     Pulse Pulse Oximetry                87 97%           Blood Pressure from Last 3 Encounters:   17 146/68   17 135/60   17 124/56    Weight from Last 3 Encounters:   17 50.1 kg (110 lb 6.4 oz)   17 50.6 kg (111 lb 9.6 oz)   16 49.3 kg (108 lb 11.2 oz)              Today, you had the following     No orders found for display         Where to get your medicines      These medications were sent to [id number] - [place], [place] - [address]  [address] [facility] [id number]     Phone:  [phone]     triamcinolone 0.1 % cream          Primary Care Provider Office Phone # Fax #    Braxton Boone [phone]       [facility] [address]  [facility] [address]        Thank you!     Thank you for choosing [facility]  for your care. Our goal is always to provide you with excellent care. Hearing back from our patients is one way we can continue to improve our services. Please take a few minutes to complete the written survey that you may receive in the mail after your visit with us. Thank you!             Your Updated Medication List - Protect others around you: Learn how to safely use, store and throw away your medicines at www.disposemymeds.org.          This list is accurate as of: 5/25/17  2:54 PM.  Always use your most recent med list.                   Brand Name Dispense Instructions for use    amLODIPine 10 MG tablet    NORVASC    90 tablet    Take 1 tablet (10 mg) by mouth daily       aspirin 81 MG tablet      Take  by mouth daily.       calcium 600 MG tablet      Take 1 tablet by mouth 2 times daily       cholecalciferol 1000 UNIT tablet    vitamin D     5 TABLETS DAILY       levothyroxine 88 MCG tablet    SYNTHROID/LEVOTHROID    90 tablet    Take 1 tablet (88 mcg) by mouth daily       losartan 100 MG tablet    COZAAR    90 tablet    Take 1 tablet (100 mg) by mouth daily       metFORMIN 500 MG 24 hr tablet    GLUCOPHAGE-XR    360 tablet    Take 4 tablets (2,000 mg) by mouth daily (with breakfast) (OR 2 TABLETS IN AM AND 2 IN PM)       MULTIVITAMIN TABS   OR      1 TABLET DAILY       predniSONE 10 MG tablet    DELTASONE    15 tablet    3 tabs PO QAM x 5 days       simvastatin 10 MG tablet    ZOCOR    90 tablet    Take 1 tablet (10 mg) by mouth At Bedtime       traZODone 100 MG tablet    DESYREL    90 tablet    Take 1 tablet (100 mg) by mouth nightly as needed for sleep       triamcinolone 0.1 % cream    KENALOG     454 g    Apply to legs BID x 1-2 weeks then PRN

## 2017-07-04 ENCOUNTER — OFFICE VISIT (OUTPATIENT)
Dept: URGENT CARE | Facility: URGENT CARE | Age: 72
End: 2017-07-04
Payer: COMMERCIAL

## 2017-07-04 VITALS
TEMPERATURE: 97.8 F | BODY MASS INDEX: 20.6 KG/M2 | HEART RATE: 67 BPM | WEIGHT: 109 LBS | SYSTOLIC BLOOD PRESSURE: 157 MMHG | DIASTOLIC BLOOD PRESSURE: 73 MMHG

## 2017-07-04 DIAGNOSIS — B02.9 HERPES ZOSTER WITHOUT COMPLICATION: Primary | ICD-10-CM

## 2017-07-04 PROCEDURE — 99213 OFFICE O/P EST LOW 20 MIN: CPT | Performed by: PHYSICIAN ASSISTANT

## 2017-07-04 RX ORDER — VALACYCLOVIR HYDROCHLORIDE 1 G/1
1000 TABLET, FILM COATED ORAL 3 TIMES DAILY
Qty: 21 TABLET | Refills: 0 | Status: SHIPPED | OUTPATIENT
Start: 2017-07-04 | End: 2017-07-24

## 2017-07-04 NOTE — PROGRESS NOTES
HPI  Gregoria Gan is a 72 year old female who presents today with rash over her abdominal wall for the past 3 days.  Also accompanied by  today who interprets for patient.  Developed tender, mildly itchy rash over the R side of her abdominal wall 3days ago and was told by the pharmacy that this could be shingles.  Describes red blister like rash that seems to be spreading to her flank region.  No drainage or fevers.  No new soaps/lotions/detergent, no new medications or foods.  No one else in the family with similar rashes.  No URI symptoms or fevers.  Has tried OTC hydrocortisone cream with minimal relief.    Reviewed PMH.  Patient Active Problem List   Diagnosis     Autoimmune hypothyroidism     Type 2 diabetes mellitus with microalbuminuria, without long-term current use of insulin (H)     Hyperlipidemia LDL goal <100     Insomnia     CKD (chronic kidney disease) stage 3, GFR 30-59 ml/min     Advanced directives, counseling/discussion     Benign hypertension with CKD (chronic kidney disease) stage III     Current Outpatient Prescriptions   Medication Sig Dispense Refill     triamcinolone (KENALOG) 0.1 % cream Apply to legs BID x 1-2 weeks then  g 1     metFORMIN (GLUCOPHAGE-XR) 500 MG 24 hr tablet Take 4 tablets (2,000 mg) by mouth daily (with breakfast) (OR 2 TABLETS IN AM AND 2 IN PM) 360 tablet 1     simvastatin (ZOCOR) 10 MG tablet Take 1 tablet (10 mg) by mouth At Bedtime 90 tablet 3     levothyroxine (SYNTHROID, LEVOTHROID) 88 MCG tablet Take 1 tablet (88 mcg) by mouth daily 90 tablet 3     losartan (COZAAR) 100 MG tablet Take 1 tablet (100 mg) by mouth daily 90 tablet 3     amLODIPine (NORVASC) 10 MG tablet Take 1 tablet (10 mg) by mouth daily 90 tablet 3     traZODone (DESYREL) 100 MG tablet Take 1 tablet (100 mg) by mouth nightly as needed for sleep 90 tablet 3     calcium 600 MG tablet Take 1 tablet by mouth 2 times daily       aspirin 81 MG tablet Take  by mouth daily.        VITAMIN D 1000 UNIT OR TABS 5 TABLETS DAILY       MULTIVITAMIN TABS   OR 1 TABLET DAILY       predniSONE (DELTASONE) 10 MG tablet 3 tabs PO QAM x 5 days (Patient not taking: Reported on 7/4/2017) 15 tablet 0     Allergies   Allergen Reactions     No Known Drug Allergy        ROS  All other ROS are negative.      Physical Exam   Constitutional: She is oriented to person, place, and time and well-developed, well-nourished, and in no distress. No distress.   Neurological: She is alert and oriented to person, place, and time.   Skin: Skin is warm, dry and intact. Rash noted. Rash is vesicular.        2 clustered group of vesicles on erythematous base present along dermatomal pattern T9.  Mild tenderness but no drainage.    Psychiatric: Mood and affect normal.   Nursing note and vitals reviewed.      Assessment/Plan:  Herpes zoster without complication:  Rash is consistent with shingles.  Reports mild tenderness and pruritis.  Will start valacyclovir as directed.  Tylenol as needed for pain.  Educated patient on pathophysiology, course and complications of shingles.  She is considered contagious until all lesions have crusted over.  Recheck in clinic if symptoms worsen or if symptoms do not improve.     -     valACYclovir (VALTREX) 1000 mg tablet; Take 1 tablet (1,000 mg) by mouth 3 times daily          Anel See CORNELL Holley

## 2017-07-04 NOTE — NURSING NOTE
"Chief Complaint   Patient presents with     Derm Problem     itchy burning painful rash on stomach for three days.        Initial /73  Pulse 67  Temp 97.8  F (36.6  C) (Oral)  Wt 109 lb (49.4 kg)  Breastfeeding? No  BMI 20.6 kg/m2 Estimated body mass index is 20.6 kg/(m^2) as calculated from the following:    Height as of 1/23/17: 5' 1\" (1.549 m).    Weight as of this encounter: 109 lb (49.4 kg).  Medication Reconciliation: complete    "

## 2017-07-04 NOTE — MR AVS SNAPSHOT
After Visit Summary   7/4/2017    Gregoria Gan    MRN: [id number]           Patient Information     Date Of Birth          1945        Visit Information        Provider Department      7/4/2017 2:25 PM Anel Holley PA-C [facility]        Today's Diagnoses     Herpes zoster without complication    -  1       Follow-ups after your visit        Your next 10 appointments already scheduled     Jul 20, 2017  9:30 AM CDT   [facility]    [address] [place] [address] [id number] [phone]           Patient must bring picture ID.  Patient should be prepared to give a urine specimen  Please do not eat 10-12 hours before your appointment if you are coming in fasting for labs on lipids, cholesterol, or glucose (sugar).  Pregnant women should follow their Care Team instructions. Water with medications is okay. Do not drink coffee or other fluids.   If you have concerns about taking  your medications, please ask at office or if scheduling via [place], send a message by clicking on Secure Messaging, Message Your Care Team.            Jul 24, 2017  2:40 PM CDT   [facility]   [facility]    [address] [place] [id number] [phone]           Bring a current list of meds and any records pertaining to this visit.  For Physicals, please bring immunization records and any forms needing to be filled out.  Please arrive 10 minutes early to complete paperwork.              Who to contact     If you have questions or need follow up information about today's clinic visit or your schedule please contact [facility] directly at [phone].  Normal or non-critical lab and imaging results will be communicated to you by MyChart, letter or phone within 4  "business days after the clinic has received the results. If you do not hear from us within 7 days, please contact the clinic through [facility] or phone. If you have a critical or abnormal lab result, we will notify you by phone as soon as possible.  Submit refill requests through Celery or call your pharmacy and they will forward the refill request to us. Please allow 3 business days for your refill to be completed.          Additional Information About Your Visit        Celery Information     Celery lets you send messages to your doctor, view your test results, renew your prescriptions, schedule appointments and more. To sign up, go to [email or web address] . Click on \"Log in\" on the left side of the screen, which will take you to the Welcome page. Then click on \"Sign up Now\" on the right side of the page.     You will be asked to enter the access code listed below, as well as some personal information. Please follow the directions to create your username and password.     Your access code is: [email or web address]  Expires: 2017 11:18 AM     Your access code will  in 90 days. If you need help or a new code, please call your [facility] clinic or [phone].        Care EveryWhere ID     This is your Care EveryWhere ID. This could be used by other organizations to access your [facility] medical records  [id number]        Your Vitals Were     Pulse Temperature Breastfeeding? BMI (Body Mass Index)          67 97.8  F (36.6  C) (Oral) No 20.6 kg/m2         Blood Pressure from Last 3 Encounters:   17 157/73   17 146/68   17 135/60    Weight from Last 3 Encounters:   17 109 lb (49.4 kg)   17 110 lb 6.4 oz (50.1 kg)   17 111 lb 9.6 oz (50.6 kg)              Today, you had the following     No orders found for display         Today's Medication Changes          These changes are accurate as of: 17  3:31 PM.  If you have any questions, ask your nurse or doctor.             "   Start taking these medicines.        Dose/Directions    valACYclovir 1000 mg tablet   Commonly known as:  VALTREX   Used for:  Herpes zoster without complication   Started by:  Anel Holley PA-C        Dose:  1000 mg   Take 1 tablet (1,000 mg) by mouth 3 times daily   Quantity:  21 tablet   Refills:  0            Where to get your medicines      These medications were sent to [facility] [address].  [address] [address]     Phone:  [phone]     valACYclovir 1000 mg tablet                Primary Care Provider Office Phone # Fax #    Braxton Boone [phone]       [facility] [address]  [facility] [address]        Equal Access to Services     SAMANTA SWANSON : Kathi fung Sochris, waaxda luqadaha, qaybta kaalmada adeegyada, elin alas . So [facility] [phone].    ATENCIÓN: Si habla español, tiene a rob disposición servicios gratuitos de asistencia lingüística. Llame al [phone].    We comply with applicable federal civil rights laws and [place] laws. We do not discriminate on the basis of race, color, national origin, age, disability sex, sexual orientation or gender identity.            Thank you!     Thank you for choosing [facility]  for your care. Our goal is always to provide you with excellent care. Hearing back from our patients is one way we can continue to improve our services. Please take a few minutes to complete the written survey that you may receive in the mail after your visit with us. Thank you!             Your Updated Medication List - Protect others around you: Learn how to safely use, store and throw away your medicines at www.disposemymeds.org.          This list is accurate as of: 7/4/17  3:31 PM.  Always use your most recent med list.                   Brand Name Dispense Instructions for use Diagnosis    amLODIPine 10  MG tablet    NORVASC    90 tablet    Take 1 tablet (10 mg) by mouth daily    HTN (hypertension), benign       aspirin 81 MG tablet      Take  by mouth daily.        calcium 600 MG tablet      Take 1 tablet by mouth 2 times daily        cholecalciferol 1000 UNIT tablet    vitamin D     5 TABLETS DAILY        levothyroxine 88 MCG tablet    SYNTHROID/LEVOTHROID    90 tablet    Take 1 tablet (88 mcg) by mouth daily    Autoimmune hypothyroidism       losartan 100 MG tablet    COZAAR    90 tablet    Take 1 tablet (100 mg) by mouth daily    HTN (hypertension), benign       metFORMIN 500 MG 24 hr tablet    GLUCOPHAGE-XR    360 tablet    Take 4 tablets (2,000 mg) by mouth daily (with breakfast) (OR 2 TABLETS IN AM AND 2 IN PM)    Type 2 diabetes mellitus without complication (H)       MULTIVITAMIN TABS   OR      1 TABLET DAILY        predniSONE 10 MG tablet    DELTASONE    15 tablet    3 tabs PO QAM x 5 days    Acute dermatitis       simvastatin 10 MG tablet    ZOCOR    90 tablet    Take 1 tablet (10 mg) by mouth At Bedtime    Hyperlipidemia LDL goal <100       traZODone 100 MG tablet    DESYREL    90 tablet    Take 1 tablet (100 mg) by mouth nightly as needed for sleep    Insomnia, unspecified type       triamcinolone 0.1 % cream    KENALOG    454 g    Apply to legs BID x 1-2 weeks then PRN    Acute dermatitis       valACYclovir 1000 mg tablet    VALTREX    21 tablet    Take 1 tablet (1,000 mg) by mouth 3 times daily    Herpes zoster without complication

## 2017-07-13 ENCOUNTER — OFFICE VISIT (OUTPATIENT)
Dept: URGENT CARE | Facility: URGENT CARE | Age: 72
End: 2017-07-13
Payer: COMMERCIAL

## 2017-07-13 VITALS
HEART RATE: 67 BPM | TEMPERATURE: 97.2 F | DIASTOLIC BLOOD PRESSURE: 68 MMHG | OXYGEN SATURATION: 97 % | SYSTOLIC BLOOD PRESSURE: 144 MMHG | BODY MASS INDEX: 20.22 KG/M2 | WEIGHT: 107 LBS

## 2017-07-13 DIAGNOSIS — B02.9 HERPES ZOSTER WITHOUT COMPLICATION: Primary | ICD-10-CM

## 2017-07-13 PROCEDURE — 99213 OFFICE O/P EST LOW 20 MIN: CPT | Performed by: PHYSICIAN ASSISTANT

## 2017-07-13 NOTE — PROGRESS NOTES
SUBJECTIVE:  Gregoria Gan is a 72 year old female who presents to the clinic today for a rash.  Onset of rash was 8 day(s) ago.   Rash is still present.  Location of the rash: right side chest, back.  Quality/symptoms of rash: itching   Symptoms are mild to moderate and rash seems to be nearly resolved.  Previous history of a similar rash? Yes, has shingles  Recent exposure history: none  Recent new medications: none  Associated symptoms include: itching and some mild discomfort.    Past Medical History:   Diagnosis Date     CKD (chronic kidney disease) stage 3, GFR 30-59 ml/min      HTN (hypertension), benign      Other and unspecified hyperlipidemia      Type 2 diabetes, HbA1c goal < 7% (H)      Unspecified hypothyroidism         Allergies   Allergen Reactions     No Known Drug Allergy      Social History   Substance Use Topics     Smoking status: Never Smoker     Smokeless tobacco: Never Used     Alcohol use No       ROS:  CONSTITUTIONAL:NEGATIVE for fever, chills, change in weight  INTEGUMENTARY/SKIN: NEGATIVE for right side rash, mild macular  ENT/MOUTH: NEGATIVE for ear, mouth and throat problems  RESP:NEGATIVE for significant cough or SOB  CV: NEGATIVE for chest pain, palpitations or peripheral edema  MUSCULOSKELETAL: NEGATIVE for significant arthralgias or myalgia  NEURO: NEGATIVE for weakness, dizziness or paresthesias    EXAM:   /68 (BP Location: Right arm, Cuff Size: Adult Regular)  Pulse 67  Temp 97.2  F (36.2  C) (Oral)  Wt 107 lb (48.5 kg)  SpO2 97%  BMI 20.22 kg/m2  GENERAL: alert, no acute distress.  SKIN: Rash description:    Distribution: dermatomal  Location: right side    Color: red and skin color,  Lesion type: macular, linear with inflammation  RESP: lungs clear to auscultation - no rales, rhonchi or wheezes  CV: regular rates and rhythm, normal S1 S2, no murmur noted  NEURO: Normal strength and tone, sensory exam grossly normal,  normal speech and  mentation    ASSESSMENT/PLAN:      ICD-10-CM    1. Herpes zoster without complication B02.9 capsaicin 0.035 % CREA       1) Shingles information given to patient   2) Follow-up with primary clinic if not improving

## 2017-07-13 NOTE — PATIENT INSTRUCTIONS
Shingles  Shingles is a viral infection caused by the same virus as chicken pox. Anyone who has had chicken pox may get shingles later in life. The virus stays in the body, but remains dormant (asleep). Shingles often occurs in older persons or persons with lowered immunity. But it can affect anyone at any age.  Shingles starts as a tingling patch of skin on one side of the body. Small, painful blisters may then appear. The rash does not spread to the rest of the body.  Exposure to shingles cannot cause shingles. However, it can cause chicken pox in anyone who has not had chicken pox or has not been vaccinated. The contagious period ends when all blisters have crusted over (generally about 2 weeks after the illness begins).  After the blisters heal, the affected skin may be sensitive or painful for months (neuralgia). This often gradually goes away.  A shingles vaccine is available. This can help prevent shingles or make it less painful. It is generally recommended for adults over the age of 60 who have had chicken pox in the past, but who have never had shingles. Adults over 60 who have had neither chicken pox nor shingles can prevent both diseases with the chicken pox vaccine. Ask your healthcare provider about these vaccines.  Home care    Medicines may be prescribed to help relieve pain. Take these medicines as directed. Ask your healthcare provider or pharmacist before using over-the-counter medicines for helping treat pain and itching.    In certain cases, antiviral medicines may be prescribed to reduce pain, shorten the illness, and prevent neuralgia. Take these medicines as directed.    Compresses made from a solution of cool water mixed with cornstarch or baking soda may help relieve pain and itching.     Gently wash skin daily with soap and water to help prevent infection.  Be certain to rinse off all of the soap, which can be irritating.    Trim fingernails and try not to scratch. Scratching the sores  may leave scars.    Stay home from work or school until all blisters have formed a crust and you are no longer contagious.  Follow-up care  Follow up with your healthcare provider or as directed by our staff.  When to seek medical advice    Fever of 100.4 F (38 C) or higher, or as directed by your healthcare provider    Affected skin is on the face or neck and any of the following occur:    Headache    Eye pain    Changes in vision    Sores near the eye    Weakness of facial muscles    Pain, redness, or swelling of a joint    Signs of skin infection: colored drainage from the sores, warmth, increasing redness, or increasing pain  Date Last Reviewed: 9/25/2015 2000-2017 [employer]. [address] [place] [address], [place] [address]. All rights reserved. This information is not intended as a substitute for professional medical care. Always follow your healthcare professional's instructions.

## 2017-07-13 NOTE — MR AVS SNAPSHOT
After Visit Summary   7/13/2017    Gregoria Gan    MRN: [id number]           Patient Information     Date Of Birth          1945        Visit Information        Provider Department      7/13/2017 10:30 AM Zac Carbajal PA-C [facility]        Today's Diagnoses     Herpes zoster without complication    -  1      Care Instructions      Shingles  Shingles is a viral infection caused by the same virus as chicken pox. Anyone who has had chicken pox may get shingles later in life. The virus stays in the body, but remains dormant (asleep). Shingles often occurs in older persons or persons with lowered immunity. But it can affect anyone at any age.  Shingles starts as a tingling patch of skin on one side of the body. Small, painful blisters may then appear. The rash does not spread to the rest of the body.  Exposure to shingles cannot cause shingles. However, it can cause chicken pox in anyone who has not had chicken pox or has not been vaccinated. The contagious period ends when all blisters have crusted over (generally about 2 weeks after the illness begins).  After the blisters heal, the affected skin may be sensitive or painful for months (neuralgia). This often gradually goes away.  A shingles vaccine is available. This can help prevent shingles or make it less painful. It is generally recommended for adults over the age of 60 who have had chicken pox in the past, but who have never had shingles. Adults over 60 who have had neither chicken pox nor shingles can prevent both diseases with the chicken pox vaccine. Ask your healthcare provider about these vaccines.  Home care    Medicines may be prescribed to help relieve pain. Take these medicines as directed. Ask your healthcare provider or pharmacist before using over-the-counter medicines for helping treat pain and itching.    In certain cases, antiviral medicines may be prescribed to reduce pain, shorten the  illness, and prevent neuralgia. Take these medicines as directed.    Compresses made from a solution of cool water mixed with cornstarch or baking soda may help relieve pain and itching.     Gently wash skin daily with soap and water to help prevent infection.  Be certain to rinse off all of the soap, which can be irritating.    Trim fingernails and try not to scratch. Scratching the sores may leave scars.    Stay home from work or school until all blisters have formed a crust and you are no longer contagious.  Follow-up care  Follow up with your healthcare provider or as directed by our staff.  When to seek medical advice    Fever of 100.4 F (38 C) or higher, or as directed by your healthcare provider    Affected skin is on the face or neck and any of the following occur:    Headache    Eye pain    Changes in vision    Sores near the eye    Weakness of facial muscles    Pain, redness, or swelling of a joint    Signs of skin infection: colored drainage from the sores, warmth, increasing redness, or increasing pain  Date Last Reviewed: 9/25/2015 2000-2017 [employer]. [address] [place] [address]. All rights reserved. This information is not intended as a substitute for professional medical care. Always follow your healthcare professional's instructions.                Follow-ups after your visit        Your next 10 appointments already scheduled     Jul 20, 2017  9:30 AM CDT   [facility]   [facility] ([facility])    [address] [id number] [phone]           Patient must bring picture ID.  Patient should be prepared to give a urine specimen  Please do not eat 10-12 hours before your appointment if you are coming in fasting for labs on lipids, cholesterol, or glucose (sugar).  Pregnant women should follow their Care Team instructions. Water with medications is okay. Do not drink coffee or other  "fluids.   If you have concerns about taking  your medications, please ask at office or if scheduling via [employer], send a message by clicking on Secure Messaging, Message Your Care Team.            Jul 24, 2017  2:40 PM CDT   Office Visit with Braxton Boone MD   [facility] ([facility])    [address] [id number] [phone]           Bring a current list of meds and any records pertaining to this visit.  For Physicals, please bring immunization records and any forms needing to be filled out.  Please arrive 10 minutes early to complete paperwork.              Who to contact     If you have questions or need follow up information about today's clinic visit or your schedule please contact [facility] directly at [phone].  Normal or non-critical lab and imaging results will be communicated to you by MyChart, letter or phone within 4 business days after the clinic has received the results. If you do not hear from us within 7 days, please contact the clinic through [employer] or phone. If you have a critical or abnormal lab result, we will notify you by phone as soon as possible.  Submit refill requests through [employer] or call your pharmacy and they will forward the refill request to us. Please allow 3 business days for your refill to be completed.          Additional Information About Your Visit        [facility] Information     [employer] lets you send messages to your doctor, view your test results, renew your prescriptions, schedule appointments and more. To sign up, go to [email or web address] . Click on \"Log in\" on the left side of the screen, which will take you to the Welcome page. Then click on \"Sign up Now\" on the right side of the page.     You will be asked to enter the access code listed below, as well as some personal information. Please follow the directions to create your username and password.   "   Your access code is: [email or web address]  Expires: 2017 11:18 AM     Your access code will  in 90 days. If you need help or a new code, please call your [place] clinic or [phone].        Care EveryWhere ID     This is your Care EveryWhere ID. This could be used by other organizations to access your [place] medical records  [id number]        Your Vitals Were     Pulse Temperature Pulse Oximetry BMI (Body Mass Index)          67 97.2  F (36.2  C) (Oral) 97% 20.22 kg/m2         Blood Pressure from Last 3 Encounters:   17 144/68   17 157/73   17 146/68    Weight from Last 3 Encounters:   17 107 lb (48.5 kg)   17 109 lb (49.4 kg)   17 110 lb 6.4 oz (50.1 kg)              Today, you had the following     No orders found for display         Today's Medication Changes          These changes are accurate as of: 17 11:12 AM.  If you have any questions, ask your nurse or doctor.               Start taking these medicines.        Dose/Directions    capsaicin 0.035 % Crea   Used for:  Herpes zoster without complication   Started by:  Zac Carbajal, CORNELL        Apply to affected areas tid prn   Quantity:  42.5 g   Refills:  0            Where to get your medicines      These medications were sent to [facility] [address] [address]     Phone:  [phone]     capsaicin 0.035 % Crea                Primary Care Provider Office Phone # Fax #    Braxton Boone [phone]       [facility] [address] [address]        Equal Access to Services     SAMANTA SWANSON AH: Kathi Guevara, wazofia blair, qaybta kaalmayamil jang, elin olivo. So [facility] [phone].    ATENCIÓN: Si habla español, tiene a rob disposición servicios gratuitos de asistencia lingüística. Tommie juarez [phone].    We comply with applicable federal  civil rights laws and [place] laws. We do not discriminate on the basis of race, color, national origin, age, disability sex, sexual orientation or gender identity.            Thank you!     Thank you for choosing [facility]  for your care. Our goal is always to provide you with excellent care. Hearing back from our patients is one way we can continue to improve our services. Please take a few minutes to complete the written survey that you may receive in the mail after your visit with us. Thank you!             Your Updated Medication List - Protect others around you: Learn how to safely use, store and throw away your medicines at www.disposemymeds.org.          This list is accurate as of: 7/13/17 11:12 AM.  Always use your most recent med list.                   Brand Name Dispense Instructions for use Diagnosis    amLODIPine 10 MG tablet    NORVASC    90 tablet    Take 1 tablet (10 mg) by mouth daily    HTN (hypertension), benign       aspirin 81 MG tablet      Take  by mouth daily.        calcium 600 MG tablet      Take 1 tablet by mouth 2 times daily        capsaicin 0.035 % Crea     42.5 g    Apply to affected areas tid prn    Herpes zoster without complication       cholecalciferol 1000 UNIT tablet    vitamin D     5 TABLETS DAILY        levothyroxine 88 MCG tablet    SYNTHROID/LEVOTHROID    90 tablet    Take 1 tablet (88 mcg) by mouth daily    Autoimmune hypothyroidism       losartan 100 MG tablet    COZAAR    90 tablet    Take 1 tablet (100 mg) by mouth daily    HTN (hypertension), benign       metFORMIN 500 MG 24 hr tablet    GLUCOPHAGE-XR    360 tablet    Take 4 tablets (2,000 mg) by mouth daily (with breakfast) (OR 2 TABLETS IN AM AND 2 IN PM)    Type 2 diabetes mellitus without complication (H)       MULTIVITAMIN TABS   OR      1 TABLET DAILY        predniSONE 10 MG tablet    DELTASONE    15 tablet    3 tabs PO QAM x 5 days    Acute dermatitis       simvastatin 10 MG  tablet    ZOCOR    90 tablet    Take 1 tablet (10 mg) by mouth At Bedtime    Hyperlipidemia LDL goal <100       traZODone 100 MG tablet    DESYREL    90 tablet    Take 1 tablet (100 mg) by mouth nightly as needed for sleep    Insomnia, unspecified type       triamcinolone 0.1 % cream    KENALOG    454 g    Apply to legs BID x 1-2 weeks then PRN    Acute dermatitis       valACYclovir 1000 mg tablet    VALTREX    21 tablet    Take 1 tablet (1,000 mg) by mouth 3 times daily    Herpes zoster without complication

## 2017-07-17 DIAGNOSIS — I10 HTN (HYPERTENSION), BENIGN: ICD-10-CM

## 2017-07-18 RX ORDER — AMLODIPINE BESYLATE 10 MG/1
TABLET ORAL
Qty: 90 TABLET | Refills: 2 | Status: SHIPPED | OUTPATIENT
Start: 2017-07-18 | End: 2018-02-09

## 2017-07-20 DIAGNOSIS — E11.29 TYPE 2 DIABETES MELLITUS WITH MICROALBUMINURIA, WITHOUT LONG-TERM CURRENT USE OF INSULIN (H): ICD-10-CM

## 2017-07-20 DIAGNOSIS — R80.9 TYPE 2 DIABETES MELLITUS WITH MICROALBUMINURIA, WITHOUT LONG-TERM CURRENT USE OF INSULIN (H): ICD-10-CM

## 2017-07-20 DIAGNOSIS — E06.3 AUTOIMMUNE HYPOTHYROIDISM: ICD-10-CM

## 2017-07-20 LAB
ALT SERPL W P-5'-P-CCNC: 28 U/L (ref 0–50)
ANION GAP SERPL CALCULATED.3IONS-SCNC: 6 MMOL/L (ref 3–14)
BUN SERPL-MCNC: 22 MG/DL (ref 7–30)
CALCIUM SERPL-MCNC: 9.9 MG/DL (ref 8.5–10.1)
CHLORIDE SERPL-SCNC: 106 MMOL/L (ref 94–109)
CHOLEST SERPL-MCNC: 172 MG/DL
CO2 SERPL-SCNC: 26 MMOL/L (ref 20–32)
CREAT SERPL-MCNC: 1.11 MG/DL (ref 0.52–1.04)
CREAT UR-MCNC: 12 MG/DL
GFR SERPL CREATININE-BSD FRML MDRD: 48 ML/MIN/1.7M2
GLUCOSE SERPL-MCNC: 115 MG/DL (ref 70–99)
HBA1C MFR BLD: 6.8 % (ref 4.3–6)
HDLC SERPL-MCNC: 55 MG/DL
LDLC SERPL CALC-MCNC: 60 MG/DL
MICROALBUMIN UR-MCNC: 22 MG/L
MICROALBUMIN/CREAT UR: 182.11 MG/G CR (ref 0–25)
NONHDLC SERPL-MCNC: 117 MG/DL
POTASSIUM SERPL-SCNC: 5.1 MMOL/L (ref 3.4–5.3)
SODIUM SERPL-SCNC: 138 MMOL/L (ref 133–144)
T4 FREE SERPL-MCNC: 1.17 NG/DL (ref 0.76–1.46)
TRIGL SERPL-MCNC: 284 MG/DL
TSH SERPL DL<=0.005 MIU/L-ACNC: 15.59 MU/L (ref 0.4–4)

## 2017-07-20 PROCEDURE — 36415 COLL VENOUS BLD VENIPUNCTURE: CPT | Performed by: INTERNAL MEDICINE

## 2017-07-20 PROCEDURE — 84460 ALANINE AMINO (ALT) (SGPT): CPT | Performed by: INTERNAL MEDICINE

## 2017-07-20 PROCEDURE — 83036 HEMOGLOBIN GLYCOSYLATED A1C: CPT | Performed by: INTERNAL MEDICINE

## 2017-07-20 PROCEDURE — 84439 ASSAY OF FREE THYROXINE: CPT | Performed by: INTERNAL MEDICINE

## 2017-07-20 PROCEDURE — 80048 BASIC METABOLIC PNL TOTAL CA: CPT | Performed by: INTERNAL MEDICINE

## 2017-07-20 PROCEDURE — 82043 UR ALBUMIN QUANTITATIVE: CPT | Performed by: INTERNAL MEDICINE

## 2017-07-20 PROCEDURE — 80061 LIPID PANEL: CPT | Performed by: INTERNAL MEDICINE

## 2017-07-20 PROCEDURE — 84443 ASSAY THYROID STIM HORMONE: CPT | Performed by: INTERNAL MEDICINE

## 2017-07-24 ENCOUNTER — OFFICE VISIT (OUTPATIENT)
Dept: INTERNAL MEDICINE | Facility: CLINIC | Age: 72
End: 2017-07-24
Payer: COMMERCIAL

## 2017-07-24 VITALS
DIASTOLIC BLOOD PRESSURE: 72 MMHG | SYSTOLIC BLOOD PRESSURE: 130 MMHG | OXYGEN SATURATION: 98 % | HEART RATE: 74 BPM | TEMPERATURE: 98.4 F | BODY MASS INDEX: 20.71 KG/M2 | WEIGHT: 109.6 LBS

## 2017-07-24 DIAGNOSIS — N18.30 BENIGN HYPERTENSION WITH CKD (CHRONIC KIDNEY DISEASE) STAGE III (H): ICD-10-CM

## 2017-07-24 DIAGNOSIS — G47.00 INSOMNIA, UNSPECIFIED TYPE: ICD-10-CM

## 2017-07-24 DIAGNOSIS — E06.3 AUTOIMMUNE HYPOTHYROIDISM: ICD-10-CM

## 2017-07-24 DIAGNOSIS — I12.9 BENIGN HYPERTENSION WITH CKD (CHRONIC KIDNEY DISEASE) STAGE III (H): ICD-10-CM

## 2017-07-24 DIAGNOSIS — R80.9 TYPE 2 DIABETES MELLITUS WITH MICROALBUMINURIA, WITHOUT LONG-TERM CURRENT USE OF INSULIN (H): Primary | ICD-10-CM

## 2017-07-24 DIAGNOSIS — E11.29 TYPE 2 DIABETES MELLITUS WITH MICROALBUMINURIA, WITHOUT LONG-TERM CURRENT USE OF INSULIN (H): Primary | ICD-10-CM

## 2017-07-24 PROCEDURE — 99214 OFFICE O/P EST MOD 30 MIN: CPT | Performed by: INTERNAL MEDICINE

## 2017-07-24 NOTE — MR AVS SNAPSHOT
"              After Visit Summary   7/24/2017    Melly Gan    MRN: [id number]           Patient Information     Date Of Birth          1945        Visit Information        Provider Department      7/24/2017 2:30 PM Braxton Boone MD; [employer] [facility]        Today's Diagnoses     Type 2 diabetes mellitus with microalbuminuria, without long-term current use of insulin (H)    -  1    Benign hypertension with CKD (chronic kidney disease) stage III        Autoimmune hypothyroidism        Insomnia, unspecified type           Follow-ups after your visit        Future tests that were ordered for you today     Open Future Orders        Priority Expected Expires Ordered    TSH Routine 8/21/2017 7/24/2018 7/24/2017            Who to contact     If you have questions or need follow up information about today's clinic visit or your schedule please contact [facility] directly at [phone].  Normal or non-critical lab and imaging results will be communicated to you by MyChart, letter or phone within 4 business days after the clinic has received the results. If you do not hear from us within 7 days, please contact the clinic through [employer] or phone. If you have a critical or abnormal lab result, we will notify you by phone as soon as possible.  Submit refill requests through [employer] or call your pharmacy and they will forward the refill request to us. Please allow 3 business days for your refill to be completed.          Additional Information About Your Visit        MyChart Information     [employer] lets you send messages to your doctor, view your test results, renew your prescriptions, schedule appointments and more. To sign up, go to [email or web address] . Click on \"Log in\" on the left side of the screen, which will take you to the Welcome page. Then click on \"Sign up Now\" on the right side of the page.     You will be asked to enter " the access code listed below, as well as some personal information. Please follow the directions to create your username and password.     Your access code is: [email or web address]  Expires: 2017 11:18 AM     Your access code will  in 90 days. If you need help or a new code, please call your [place] clinic or [phone].        Care EveryWhere ID     This is your Care EveryWhere ID. This could be used by other organizations to access your [place] medical records  [id number]        Your Vitals Were     Pulse Temperature Pulse Oximetry BMI (Body Mass Index)          74 98.4  F (36.9  C) (Oral) 98% 20.71 kg/m2         Blood Pressure from Last 3 Encounters:   17 140/62   17 144/68   17 157/73    Weight from Last 3 Encounters:   17 109 lb 9.6 oz (49.7 kg)   17 107 lb (48.5 kg)   17 109 lb (49.4 kg)                 Today's Medication Changes          These changes are accurate as of: 17  3:10 PM.  If you have any questions, ask your nurse or doctor.               Stop taking these medicines if you haven't already. Please contact your care team if you have questions.     capsaicin 0.035 % Crea   Stopped by:  Braxton Boone MD           triamcinolone 0.1 % cream   Commonly known as:  KENALOG   Stopped by:  Braxton Boone MD                    Primary Care Provider Office Phone # Fax #    Braxton Boone [phone]       [facility] [address] [place] [id number]        Equal Access to Services     [facility]: Hadii pollo Guevara, wamisbahda luqadaha, qaybta kaalmaelin rasheed . So [facility] [phone].    ATENCIÓN: Si habla español, tiene a rob disposición servicios gratuitos de asistencia lingüística. Llame al [phone].    We comply with applicable federal civil rights laws and [place] laws. We do not discriminate on the basis of race, color, national origin, age,  disability sex, sexual orientation or gender identity.            Thank you!     Thank you for choosing [facility]  for your care. Our goal is always to provide you with excellent care. Hearing back from our patients is one way we can continue to improve our services. Please take a few minutes to complete the written survey that you may receive in the mail after your visit with us. Thank you!             Your Updated Medication List - Protect others around you: Learn how to safely use, store and throw away your medicines at www.disposemymeds.org.          This list is accurate as of: 7/24/17  3:10 PM.  Always use your most recent med list.                   Brand Name Dispense Instructions for use Diagnosis    amLODIPine 10 MG tablet    NORVASC    90 tablet    Take 1 tablet by mouth daily    HTN (hypertension), benign       aspirin 81 MG tablet      Take  by mouth daily.        calcium 600 MG tablet      Take 1 tablet by mouth 2 times daily        cholecalciferol 1000 UNIT tablet    vitamin D     5 TABLETS DAILY        levothyroxine 88 MCG tablet    SYNTHROID/LEVOTHROID    90 tablet    Take 1 tablet (88 mcg) by mouth daily    Autoimmune hypothyroidism       losartan 100 MG tablet    COZAAR    90 tablet    Take 1 tablet (100 mg) by mouth daily    HTN (hypertension), benign       metFORMIN 500 MG 24 hr tablet    GLUCOPHAGE-XR    360 tablet    Take 4 tablets (2,000 mg) by mouth daily (with breakfast) (OR 2 TABLETS IN AM AND 2 IN PM)    Type 2 diabetes mellitus without complication (H)       MULTIVITAMIN TABS   OR      1 TABLET DAILY        simvastatin 10 MG tablet    ZOCOR    90 tablet    Take 1 tablet (10 mg) by mouth At Bedtime    Hyperlipidemia LDL goal <100       traZODone 100 MG tablet    DESYREL    90 tablet    Take 1 tablet (100 mg) by mouth nightly as needed for sleep    Insomnia, unspecified type

## 2017-07-24 NOTE — NURSING NOTE
"Chief Complaint   Patient presents with     Diabetes       Initial /62  Pulse 74  Temp 98.4  F (36.9  C) (Oral)  Wt 109 lb 9.6 oz (49.7 kg)  SpO2 98%  BMI 20.71 kg/m2 Estimated body mass index is 20.71 kg/(m^2) as calculated from the following:    Height as of 1/23/17: 5' 1\" (1.549 m).    Weight as of this encounter: 109 lb 9.6 oz (49.7 kg).  Medication Reconciliation: complete    "

## 2017-07-24 NOTE — PROGRESS NOTES
SUBJECTIVE:                                                    Gregoria Gan is a 72 year old female who presents to clinic today for the following health issues:      Diabetes Follow-up  Lab Results   Component Value Date    A1C 6.8 07/20/2017    A1C 6.7 01/21/2017    A1C 6.8 06/21/2016    A1C 6.7 10/24/2015    A1C 6.8 03/21/2015       Patient is checking blood sugars: once daily.  Results are as follows:       am - 135 - 145        Diabetic concerns: None     Symptoms of hypoglycemia (low blood sugar): none     Paresthesias (numbness or burning in feet) or sores: No   Date of last diabetic eye exam: not within in the last year      Amount of exercise or physical activity: None    Problems taking medications regularly: No    Medication side effects: none  Diet: regular (no restrictions)    Patient also complains of pain where she had shingles a few weeks ago, not being able to sleep and would like to discuss lab results   just finished the course of anti-viral a few weeks ago. Lesions are now crusted    Hypertension Follow-up      Outpatient blood pressures are being checked at home.  Results are 120s-130s/70s.    Low Salt Diet: no added salt          Problem list and histories reviewed & adjusted, as indicated.  Additional history: as documented    Labs reviewed in EPIC    Reviewed and updated as needed this visit by clinical staffAllergies       Reviewed and updated as needed this visit by Provider         ROS:  Constitutional, HEENT, cardiovascular, pulmonary, gi and gu systems are negative, except as otherwise noted.      OBJECTIVE:                                                    /62  Pulse 74  Temp 98.4  F (36.9  C) (Oral)  Wt 109 lb 9.6 oz (49.7 kg)  SpO2 98%  BMI 20.71 kg/m2  Body mass index is 20.71 kg/(m^2).  GENERAL APPEARANCE: alert and no distress  HENT: nose and mouth without ulcers or lesions  NECK: no adenopathy, no asymmetry, masses, or scars and thyroid normal to  palpation  RESP: lungs clear to auscultation - no rales, rhonchi or wheezes  CV: regular rates and rhythm, normal S1 S2, no S3 or S4 and no murmur, click or rub  SKIN: healing scattered rash on R chest under breast   DIABETIC FOOT EXAM: normal DP and PT pulses, no trophic changes or ulcerative lesions, normal sensory exam and normal monofilament exam    Diagnostic test results:  Results for orders placed or performed in visit on 07/20/17   Basic metabolic panel   Result Value Ref Range    Sodium 138 133 - 144 mmol/L    Potassium 5.1 3.4 - 5.3 mmol/L    Chloride 106 94 - 109 mmol/L    Carbon Dioxide 26 20 - 32 mmol/L    Anion Gap 6 3 - 14 mmol/L    Glucose 115 (H) 70 - 99 mg/dL    Urea Nitrogen 22 7 - 30 mg/dL    Creatinine 1.11 (H) 0.52 - 1.04 mg/dL    GFR Estimate 48 (L) >60 mL/min/1.7m2    GFR Estimate If Black 58 (L) >60 mL/min/1.7m2    Calcium 9.9 8.5 - 10.1 mg/dL   Hemoglobin A1c   Result Value Ref Range    Hemoglobin A1C 6.8 (H) 4.3 - 6.0 %   ALT   Result Value Ref Range    ALT 28 0 - 50 U/L   Lipid panel reflex to direct LDL   Result Value Ref Range    Cholesterol 172 <200 mg/dL    Triglycerides 284 (H) <150 mg/dL    HDL Cholesterol 55 >49 mg/dL    LDL Cholesterol Calculated 60 <100 mg/dL    Non HDL Cholesterol 117 <130 mg/dL   TSH with free T4 reflex   Result Value Ref Range    TSH 15.59 (H) 0.40 - 4.00 mU/L   Albumin Random Urine Quantitative   Result Value Ref Range    Creatinine Urine 12 mg/dL    Albumin Urine mg/L 22 mg/L    Albumin Urine mg/g Cr 182.11 (H) 0 - 25 mg/g Cr   T4 free   Result Value Ref Range    T4 Free 1.17 0.76 - 1.46 ng/dL          ASSESSMENT/PLAN:                                                    1. Type 2 diabetes mellitus with microalbuminuria, without long-term current use of insulin (H)  At goal- continue meds    2. Benign hypertension with CKD (chronic kidney disease) stage III  Monitor at home. microalb improving - BP seems to be at goal    3. Autoimmune hypothyroidism  tsh up -  maybe related to recent illness. She is asymptomatic- will recheck tsh before making change in meds   - TSH; Future        Follow up with Provider - 6 mo      Braxton Boone MD  [facility]

## 2017-07-26 DIAGNOSIS — G47.00 INSOMNIA, UNSPECIFIED TYPE: ICD-10-CM

## 2017-07-26 DIAGNOSIS — I10 HTN (HYPERTENSION), BENIGN: ICD-10-CM

## 2017-07-26 RX ORDER — LOSARTAN POTASSIUM 100 MG/1
TABLET ORAL
Qty: 90 TABLET | Refills: 1 | Status: SHIPPED | OUTPATIENT
Start: 2017-07-26 | End: 2018-02-09

## 2017-07-26 RX ORDER — TRAZODONE HYDROCHLORIDE 100 MG/1
TABLET ORAL
Qty: 90 TABLET | Refills: 3 | Status: SHIPPED | OUTPATIENT
Start: 2017-07-26 | End: 2018-08-31

## 2017-09-04 DIAGNOSIS — E11.9 TYPE 2 DIABETES MELLITUS WITHOUT COMPLICATION (H): ICD-10-CM

## 2017-09-05 NOTE — TELEPHONE ENCOUNTER
metformin         Last Written Prescription Date: 03/08/17  Last Fill Quantity: 360, # refills: 1  Last Office Visit with G, [facility] or [facility] prescribing provider:  07/24/17        BP Readings from Last 3 Encounters:   07/24/17 130/72   07/13/17 144/68   07/04/17 157/73     Lab Results   Component Value Date    MICROL 22 07/20/2017     Lab Results   Component Value Date    UMALCR 182.11 07/20/2017     Creatinine   Date Value Ref Range Status   07/20/2017 1.11 (H) 0.52 - 1.04 mg/dL Final   ]  GFR Estimate   Date Value Ref Range Status   07/20/2017 48 (L) >60 mL/min/1.7m2 Final     Comment:     Non  GFR Calc   06/21/2016 45 (L) >60 mL/min/1.7m2 Final     Comment:     Non  GFR Calc   03/21/2015 56 (L) >60 mL/min/1.7m2 Final     Comment:     Non  GFR Calc     GFR Estimate If Black   Date Value Ref Range Status   07/20/2017 58 (L) >60 mL/min/1.7m2 Final     Comment:      GFR Calc   06/21/2016 54 (L) >60 mL/min/1.7m2 Final     Comment:      GFR Calc   03/21/2015 68 >60 mL/min/1.7m2 Final     Comment:      GFR Calc     Lab Results   Component Value Date    CHOL 172 07/20/2017     Lab Results   Component Value Date    HDL 55 07/20/2017     Lab Results   Component Value Date    LDL 60 07/20/2017     Lab Results   Component Value Date    TRIG 284 07/20/2017     Lab Results   Component Value Date    CHOLHDLRATIO 2.8 03/21/2015     Lab Results   Component Value Date    AST 40 02/02/2013     Lab Results   Component Value Date    ALT 28 07/20/2017     Lab Results   Component Value Date    A1C 6.8 07/20/2017    A1C 6.7 01/21/2017    A1C 6.8 06/21/2016    A1C 6.7 10/24/2015    A1C 6.8 03/21/2015     Potassium   Date Value Ref Range Status   07/20/2017 5.1 3.4 - 5.3 mmol/L Final

## 2017-09-06 ENCOUNTER — RADIANT APPOINTMENT (OUTPATIENT)
Dept: MAMMOGRAPHY | Facility: CLINIC | Age: 72
End: 2017-09-06
Attending: INTERNAL MEDICINE
Payer: COMMERCIAL

## 2017-09-06 DIAGNOSIS — Z12.31 VISIT FOR SCREENING MAMMOGRAM: ICD-10-CM

## 2017-09-06 DIAGNOSIS — E06.3 AUTOIMMUNE HYPOTHYROIDISM: ICD-10-CM

## 2017-09-06 LAB — TSH SERPL DL<=0.005 MIU/L-ACNC: 4.57 MU/L (ref 0.4–4)

## 2017-09-06 PROCEDURE — G0202 SCR MAMMO BI INCL CAD: HCPCS | Mod: TC

## 2017-09-06 PROCEDURE — 84443 ASSAY THYROID STIM HORMONE: CPT | Performed by: INTERNAL MEDICINE

## 2017-09-06 PROCEDURE — 36415 COLL VENOUS BLD VENIPUNCTURE: CPT | Performed by: INTERNAL MEDICINE

## 2017-09-06 RX ORDER — METFORMIN HCL 500 MG
TABLET, EXTENDED RELEASE 24 HR ORAL
Qty: 360 TABLET | Refills: 1 | Status: SHIPPED | OUTPATIENT
Start: 2017-09-06 | End: 2018-03-17

## 2017-09-15 ENCOUNTER — TRANSFERRED RECORDS (OUTPATIENT)
Dept: HEALTH INFORMATION MANAGEMENT | Facility: CLINIC | Age: 72
End: 2017-09-15

## 2017-11-08 DIAGNOSIS — E06.3 AUTOIMMUNE HYPOTHYROIDISM: ICD-10-CM

## 2017-11-08 DIAGNOSIS — E78.5 HYPERLIPIDEMIA LDL GOAL <100: ICD-10-CM

## 2017-11-08 RX ORDER — LEVOTHYROXINE SODIUM 88 UG/1
TABLET ORAL
Qty: 90 TABLET | Refills: 1 | Status: SHIPPED | OUTPATIENT
Start: 2017-11-08 | End: 2018-05-25

## 2017-11-08 RX ORDER — SIMVASTATIN 10 MG
TABLET ORAL
Qty: 90 TABLET | Refills: 1 | Status: SHIPPED | OUTPATIENT
Start: 2017-11-08 | End: 2018-05-06

## 2017-12-05 ENCOUNTER — OFFICE VISIT (OUTPATIENT)
Dept: URGENT CARE | Facility: URGENT CARE | Age: 72
End: 2017-12-05
Payer: COMMERCIAL

## 2017-12-05 ENCOUNTER — RADIANT APPOINTMENT (OUTPATIENT)
Dept: GENERAL RADIOLOGY | Facility: CLINIC | Age: 72
End: 2017-12-05
Attending: PHYSICIAN ASSISTANT
Payer: COMMERCIAL

## 2017-12-05 DIAGNOSIS — R19.7 DIARRHEA, UNSPECIFIED TYPE: ICD-10-CM

## 2017-12-05 DIAGNOSIS — J00 OTHER ACUTE RHINITIS: ICD-10-CM

## 2017-12-05 DIAGNOSIS — R05.9 COUGH: Primary | ICD-10-CM

## 2017-12-05 DIAGNOSIS — R05.9 COUGH: ICD-10-CM

## 2017-12-05 PROCEDURE — 71020 XR CHEST 2 VW: CPT

## 2017-12-05 PROCEDURE — 99214 OFFICE O/P EST MOD 30 MIN: CPT | Performed by: PHYSICIAN ASSISTANT

## 2017-12-05 RX ORDER — LACTOBACILLUS RHAMNOSUS GG 10B CELL
1 CAPSULE ORAL 2 TIMES DAILY
Qty: 20 CAPSULE | Refills: 0 | Status: SHIPPED | OUTPATIENT
Start: 2017-12-05 | End: 2017-12-15

## 2017-12-05 RX ORDER — LORATADINE 10 MG/1
10 TABLET ORAL DAILY
Qty: 30 TABLET | Refills: 0 | Status: SHIPPED | OUTPATIENT
Start: 2017-12-05 | End: 2017-12-08

## 2017-12-05 NOTE — PROGRESS NOTES
SUBJECTIVE:   Gregoria Gan is a 72 year old female presenting with a chief complaint of   1) watery stools x 7 since this morning.  No blood in stools.  No nausea or vomiting.  No abdominal pain.    No dysuria  No recent antibiotics.   Took imodium 2 hours ago.    She does eat out at a [employer] restaurant daily, but so does her  does as well and they eat the same thing, and he is not ill.   2) cough for the past 2 weeks.  Has been taking cough syrup.  Complains of some runny nose and sneezing with itching inside nose.      Onset of symptoms was as above.  Course of illness is worsening.    Severity moderate  Current and Associated symptoms: as above  Treatment measures tried include as above.  Predisposing factors include None.    Past Medical History:   Diagnosis Date     CKD (chronic kidney disease) stage 3, GFR 30-59 ml/min      HTN (hypertension), benign      Other and unspecified hyperlipidemia      Type 2 diabetes, HbA1c goal < 7% (H)      Unspecified hypothyroidism      Patient Active Problem List   Diagnosis     Autoimmune hypothyroidism     Type 2 diabetes mellitus with microalbuminuria, without long-term current use of insulin (H)     Hyperlipidemia LDL goal <100     Insomnia     CKD (chronic kidney disease) stage 3, GFR 30-59 ml/min     Advanced directives, counseling/discussion     Benign hypertension with CKD (chronic kidney disease) stage III     Social History   Substance Use Topics     Smoking status: Never Smoker     Smokeless tobacco: Never Used     Alcohol use No       ROS:  CONSTITUTIONAL:NEGATIVE for fever, chills, change in weight  INTEGUMENTARY/SKIN: NEGATIVE for worrisome rashes, moles or lesions  EYES: NEGATIVE for vision changes or irritation  ENT/MOUTH: as per HPI  RESP:as per HPI  CV: NEGATIVE for chest pain, palpitations or peripheral edema  GI: as per HPI  : negative  MUSCULOSKELETAL: NEGATIVE for significant arthralgias or myalgia    OBJECTIVE  :There were no vitals  taken for this visit.  GENERAL APPEARANCE: healthy, alert and no distress  EYES: EOMI,  PERRL, conjunctiva clear  HENT: ear canals and TM's normal.  Nose and mouth without ulcers, erythema or lesions  NECK: supple, nontender, no lymphadenopathy  RESP: lungs clear to auscultation - no rales, rhonchi or wheezes  CV: regular rates and rhythm, normal S1 S2, no murmur noted  ABDOMEN:  soft, nontender, no HSM or masses and bowel sounds normal  NEURO: Normal strength and tone, sensory exam grossly normal,  normal speech and mentation  SKIN: no suspicious lesions or rashes     (R05) Cough  (primary encounter diagnosis)  Comment: cough likely secondary to post nasal drip  Plan: XR Chest 2 Views        Chest xray is clear    (R19.7) Diarrhea, unspecified type  Comment:   Plan: lactobacillus rhamnosus, GG, (CULTURELL)         Capsule  See handout on diarrhea.  Avoid Imodium.             (J00) Other acute rhinitis  Comment:   Plan: loratadine (CLARITIN) 10 MG tablet    Follow up with Dr. Boucher later this week for re-check.  TO ED should symptoms worsen.  Patient expresses understanding and agreement with the assessment and plan as above, via her  who she has asked to translate for her.

## 2017-12-05 NOTE — PATIENT INSTRUCTIONS
(R05) Cough  (primary encounter diagnosis)  Comment: cough likely secondary to post nasal drip  Plan: XR Chest 2 Views        Chest xray is clear    (R19.7) Diarrhea, unspecified type  Comment:   Plan: lactobacillus rhamnosus, GG, (CULTURELL)         Capsule  See handout on diarrhea.  Avoid Imodium.             (J00) Other acute rhinitis  Comment:   Plan: loratadine (CLARITIN) 10 MG tablet    Follow up with Dr. Boucher later this week for re-check.  TO ED should symptoms worsen.                Treating Diarrhea    Diarrhea happens when you have loose, watery, or frequent bowel movements. It is a common problem with many causes. Most cases of diarrhea clear up on their own. But certain cases may need treatment. Be sure to see your healthcare provider if your symptoms do not improve within a few days.  Getting relief  Treatment of diarrhea depends on its cause. Diarrhea caused by bacterial or parasite infection is often treated with antibiotics. Diarrhea caused by other factors, such as a stomach virus, often improves with simple home treatment. The tips below may also help relieve your symptoms.    Drink plenty of fluids. This helps prevent too much fluid loss (dehydration). Water, clear soups, and electrolyte solutions are good choices. Avoid alcohol, coffee, tea, and milk. These can irritate your intestines and make symptoms worse.    Suck on ice chips if drinking makes you queasy.    Return to your normal diet slowly. You may want to eat bland foods at first, such as rice and toast. Also, you may need to avoid certain foods for a while, such as dairy products. These can make symptoms worse. Ask your healthcare provider if there are any other foods you should avoid.    If you were prescribed antibiotics, take them as directed.    Do not take anti-diarrhea medicines without asking your healthcare provider first.  Call your healthcare provider   Call your healthcare provider if you have any of the following:     A  fever of 100.4 F (38.0 C) or higher, or as directed by your healthcare provider    Severe pain    Worsening diarrhea or diarrhea for more than 2 days    Bloody vomit or stool    Signs of dehydration (dizziness, dry mouth and tongue, rapid pulse, dark urine)  Date Last Reviewed: 7/1/2016 2000-2017 [email or web address]. [address] [place], [place] [id number]. All rights reserved. This information is not intended as a substitute for professional medical care. Always follow your healthcare professional's instructions.

## 2017-12-05 NOTE — MR AVS SNAPSHOT
After Visit Summary   12/5/2017    Gregoria Gan    MRN: [id number]           Patient Information     Date Of Birth          1945        Visit Information        Provider Department      12/5/2017 3:35 PM Lilly Dubois PA-C [facility]        Today's Diagnoses     Cough    -  1    Diarrhea, unspecified type        Other acute rhinitis          Care Instructions    (R05) Cough  (primary encounter diagnosis)  Comment: cough likely secondary to post nasal drip  Plan: XR Chest 2 Views        Chest xray is clear    (R19.7) Diarrhea, unspecified type  Comment:   Plan: lactobacillus rhamnosus, GG, (CULTURELL)         Capsule  See handout on diarrhea.  Avoid Imodium.             (J00) Other acute rhinitis  Comment:   Plan: loratadine (CLARITIN) 10 MG tablet    Follow up with Dr. Boucher later this week for re-check.  TO ED should symptoms worsen.                Treating Diarrhea    Diarrhea happens when you have loose, watery, or frequent bowel movements. It is a common problem with many causes. Most cases of diarrhea clear up on their own. But certain cases may need treatment. Be sure to see your healthcare provider if your symptoms do not improve within a few days.  Getting relief  Treatment of diarrhea depends on its cause. Diarrhea caused by bacterial or parasite infection is often treated with antibiotics. Diarrhea caused by other factors, such as a stomach virus, often improves with simple home treatment. The tips below may also help relieve your symptoms.    Drink plenty of fluids. This helps prevent too much fluid loss (dehydration). Water, clear soups, and electrolyte solutions are good choices. Avoid alcohol, coffee, tea, and milk. These can irritate your intestines and make symptoms worse.    Suck on ice chips if drinking makes you queasy.    Return to your normal diet slowly. You may want to eat bland foods at first, such as rice and toast. Also, you  may need to avoid certain foods for a while, such as dairy products. These can make symptoms worse. Ask your healthcare provider if there are any other foods you should avoid.    If you were prescribed antibiotics, take them as directed.    Do not take anti-diarrhea medicines without asking your healthcare provider first.  Call your healthcare provider   Call your healthcare provider if you have any of the following:     A fever of 100.4 F (38.0 C) or higher, or as directed by your healthcare provider    Severe pain    Worsening diarrhea or diarrhea for more than 2 days    Bloody vomit or stool    Signs of dehydration (dizziness, dry mouth and tongue, rapid pulse, dark urine)  Date Last Reviewed: 7/1/2016 2000-2017 [email or web address]. [address] [place] [address], [place] [address]. All rights reserved. This information is not intended as a substitute for professional medical care. Always follow your healthcare professional's instructions.                Follow-ups after your visit        Your next 10 appointments already scheduled     Dec 08, 2017  2:00 PM CST   Office Visit with Braxton Boone MD   [facility]    [address] [place] [id number] [phone]           Bring a current list of meds and any records pertaining to this visit. For Physicals, please bring immunization records and any forms needing to be filled out. Please arrive 10 minutes early to complete paperwork.              Who to contact     If you have questions or need follow up information about today's clinic visit or your schedule please contact [facility] directly at [phone].  Normal or non-critical lab and imaging results will be communicated to you by MyChart, letter or phone within 4 business days after the clinic has received the results. If you do not hear from us within 7 days, please contact the clinic through  "[employer] or phone. If you have a critical or abnormal lab result, we will notify you by phone as soon as possible.  Submit refill requests through [email or web address] or call your pharmacy and they will forward the refill request to us. Please allow 3 business days for your refill to be completed.          Additional Information About Your Visit        [employer] Information     [email or web address] lets you send messages to your doctor, view your test results, renew your prescriptions, schedule appointments and more. To sign up, go to [email or web address] . Click on \"Log in\" on the left side of the screen, which will take you to the Welcome page. Then click on \"Sign up Now\" on the right side of the page.     You will be asked to enter the access code listed below, as well as some personal information. Please follow the directions to create your username and password.     Your access code is: [email or web address]  Expires: 3/5/2018  4:26 PM     Your access code will  in 90 days. If you need help or a new code, please call your [facility] clinic or [phone].        Care EveryWhere ID     This is your Care EveryWhere ID. This could be used by other organizations to access your [facility] medical records  [id number]         Blood Pressure from Last 3 Encounters:   17 130/72   17 144/68   17 157/73    Weight from Last 3 Encounters:   17 109 lb 9.6 oz (49.7 kg)   17 107 lb (48.5 kg)   17 109 lb (49.4 kg)                 Today's Medication Changes          These changes are accurate as of: 17  4:26 PM.  If you have any questions, ask your nurse or doctor.               Start taking these medicines.        Dose/Directions    lactobacillus rhamnosus (GG) capsule   Used for:  Diarrhea, unspecified type   Started by:  Lilly Dubois PA-C        Dose:  1 capsule   Take 1 capsule by mouth 2 times daily for 10 days   Quantity:  20 capsule   Refills:  0       loratadine 10 MG tablet   Commonly known as: "  CLARITIN   Used for:  Other acute rhinitis   Started by:  Lilly Dubois PA-C        Dose:  10 mg   Take 1 tablet (10 mg) by mouth daily   Quantity:  30 tablet   Refills:  0            Where to get your medicines      These medications were sent to [facility] [place], [place] - [address].  [address] [address]     Phone:  [phone]     loratadine 10 MG tablet         Some of these will need a paper prescription and others can be bought over the counter.  Ask your nurse if you have questions.     Bring a paper prescription for each of these medications     lactobacillus rhamnosus (GG) capsule                Primary Care Provider Office Phone # Fax #    Braxton Boone [phone]       [address] [address]        Equal Access to Services     SAMANTA SWANSON : Hadii aad ku hadasho Sochris, waaxda luqadaha, qaybta kaalmada suhail, elin alas . So [facility] [phone].    ATENCIÓN: Si habla español, tiene a rob disposición servicios gratuitos de asistencia lingüística. Tommie al [phone].    We comply with applicable federal civil rights laws and [place] laws. We do not discriminate on the basis of race, color, national origin, age, disability, sex, sexual orientation, or gender identity.            Thank you!     Thank you for choosing [facility]  for your care. Our goal is always to provide you with excellent care. Hearing back from our patients is one way we can continue to improve our services. Please take a few minutes to complete the written survey that you may receive in the mail after your visit with us. Thank you!             Your Updated Medication List - Protect others around you: Learn how to safely use, store and throw away your medicines at www.disposemymeds.org.          This list is accurate as of: 12/5/17  4:26 PM.  Always use your most recent med  list.                   Brand Name Dispense Instructions for use Diagnosis    amLODIPine 10 MG tablet    NORVASC    90 tablet    Take 1 tablet by mouth daily    HTN (hypertension), benign       aspirin 81 MG tablet      Take  by mouth daily.        calcium 600 MG tablet      Take 1 tablet by mouth 2 times daily        cholecalciferol 1000 UNIT tablet    vitamin D3     5 TABLETS DAILY        lactobacillus rhamnosus (GG) capsule     20 capsule    Take 1 capsule by mouth 2 times daily for 10 days    Diarrhea, unspecified type       levothyroxine 88 MCG tablet    SYNTHROID/LEVOTHROID    90 tablet    TAKE ONE TABLET BY MOUTH ONE TIME DAILY    Autoimmune hypothyroidism       loratadine 10 MG tablet    CLARITIN    30 tablet    Take 1 tablet (10 mg) by mouth daily    Other acute rhinitis       losartan 100 MG tablet    COZAAR    90 tablet    Take 1 tablet by mouth daily.    HTN (hypertension), benign       metFORMIN 500 MG 24 hr tablet    GLUCOPHAGE-XR    360 tablet    TAKE 4 TABLETS BY MOUTH DAILY WITH BREAKFAST (OR 2 TABLETS IN THE MORNING & 2 TABLETS IN THE EVENING)    Type 2 diabetes mellitus without complication (H)       MULTIVITAMIN TABS   OR      1 TABLET DAILY        simvastatin 10 MG tablet    ZOCOR    90 tablet    TAKE ONE TABLET BY MOUTH AT BEDTIME    Hyperlipidemia LDL goal <100       traZODone 100 MG tablet    DESYREL    90 tablet    Take 1 tablet by mouth nightly as needed for sleep    Insomnia, unspecified type

## 2017-12-08 ENCOUNTER — OFFICE VISIT (OUTPATIENT)
Dept: INTERNAL MEDICINE | Facility: CLINIC | Age: 72
End: 2017-12-08
Payer: COMMERCIAL

## 2017-12-08 VITALS
WEIGHT: 103.8 LBS | DIASTOLIC BLOOD PRESSURE: 58 MMHG | OXYGEN SATURATION: 99 % | BODY MASS INDEX: 19.61 KG/M2 | TEMPERATURE: 97.7 F | SYSTOLIC BLOOD PRESSURE: 122 MMHG | HEART RATE: 77 BPM

## 2017-12-08 DIAGNOSIS — M17.0 PRIMARY OSTEOARTHRITIS OF BOTH KNEES: ICD-10-CM

## 2017-12-08 DIAGNOSIS — J06.9 VIRAL URI WITH COUGH: Primary | ICD-10-CM

## 2017-12-08 PROCEDURE — 99214 OFFICE O/P EST MOD 30 MIN: CPT | Performed by: INTERNAL MEDICINE

## 2017-12-08 RX ORDER — GUAIFENESIN/DEXTROMETHORPHAN 100-10MG/5
5 SYRUP ORAL EVERY 4 HOURS PRN
Qty: 236 ML | Refills: 0 | Status: SHIPPED | OUTPATIENT
Start: 2017-12-08 | End: 2018-02-09

## 2017-12-08 NOTE — PROGRESS NOTES
SUBJECTIVE:   Gregoria Gan is a 72 year old female who presents to clinic today for the following health issues:    ED/UC Followup:    Facility:  [facility]  Date of visit: 12/05/17  Reason for visit: cough an diarrhea   Current Status: no longer has diarrhea but still has cough and now also has a runny nose     Seen for cough and diarrhea. Diarrhea has resovled but the cough persists to a milder degree. No other symptoms.  She doesn't think the antihistamine given has done much.     Problem list and histories reviewed & adjusted, as indicated.  Additional history: as documented    Labs reviewed in EPIC    Reviewed and updated as needed this visit by clinical staff  Tobacco  Allergies  Meds       Reviewed and updated as needed this visit by Provider       ROS:  Constitutional, HEENT, cardiovascular, pulmonary, gi and gu systems are negative, except as otherwise noted.  Orthopedic- c/o continuing OA knees. Using glucosamine    OBJECTIVE:                                                    /58  Pulse 77  Temp 97.7  F (36.5  C) (Oral)  Wt 103 lb 12.8 oz (47.1 kg)  SpO2 99%  BMI 19.61 kg/m2  Body mass index is 19.61 kg/(m^2).  GENERAL APPEARANCE: healthy, alert and no distress  HENT: nose and mouth without ulcers or lesions  NECK: no adenopathy, no asymmetry, masses, or scars and thyroid normal to palpation  RESP: lungs clear to auscultation - no rales, rhonchi or wheezes  CV: regular rates and rhythm, normal S1 S2, no S3 or S4 and no murmur, click or rub    Diagnostic test results:  none        ASSESSMENT/PLAN:                                                    1. Viral URI with cough  - guaiFENesin-dextromethorphan (ROBITUSSIN DM) 100-10 MG/5ML syrup; Take 5 mLs by mouth every 4 hours as needed for cough  Dispense: 236 mL; Refill: 0    2. Primary osteoarthritis of both knees  Try topical diclofenac  - diclofenac (VOLTAREN) 1 % GEL topical gel; Apply 4 grams to knees three to four times daily  using enclosed dosing card.  Dispense: 100 g; Refill: 5      Prn f/u     Braxton Boone MD  [facility]

## 2017-12-08 NOTE — NURSING NOTE
"Chief Complaint   Patient presents with     Cough       Initial /58  Pulse 77  Temp 97.7  F (36.5  C) (Oral)  Wt 103 lb 12.8 oz (47.1 kg)  SpO2 99%  BMI 19.61 kg/m2 Estimated body mass index is 19.61 kg/(m^2) as calculated from the following:    Height as of 1/23/17: 5' 1\" (1.549 m).    Weight as of this encounter: 103 lb 12.8 oz (47.1 kg).  Medication Reconciliation: complete    "

## 2017-12-08 NOTE — MR AVS SNAPSHOT
"              After Visit Summary   2017    Melly Gan    MRN: [id number]           Patient Information     Date Of Birth          1945        Visit Information        Provider Department      2017 1:45 PM Braxton Boone MD; [employer] [facility]        Today's Diagnoses     Viral URI with cough    -  1    Primary osteoarthritis of both knees           Follow-ups after your visit        Who to contact     If you have questions or need follow up information about today's clinic visit or your schedule please contact [facility] directly at [phone].  Normal or non-critical lab and imaging results will be communicated to you by [employer]ound[employer], letter or phone within 4 business days after the clinic has received the results. If you do not hear from us within 7 days, please contact the clinic through [employer]ound[employer] or phone. If you have a critical or abnormal lab result, we will notify you by phone as soon as possible.  Submit refill requests through [employer] or call your pharmacy and they will forward the refill request to us. Please allow 3 business days for your refill to be completed.          Additional Information About Your Visit        MyChart Information     [employer] lets you send messages to your doctor, view your test results, renew your prescriptions, schedule appointments and more. To sign up, go to [email or web address] . Click on \"Log in\" on the left side of the screen, which will take you to the Welcome page. Then click on \"Sign up Now\" on the right side of the page.     You will be asked to enter the access code listed below, as well as some personal information. Please follow the directions to create your username and password.     Your access code is: [email or web address]  Expires: 3/5/2018  4:26 PM     Your access code will  in 90 days. If you need help or a new code, please call your [facility] or [phone].   "      Care EveryWhere ID     This is your Care EveryWhere ID. This could be used by other organizations to access your [place] medical records  [id number]        Your Vitals Were     Pulse Temperature Pulse Oximetry BMI (Body Mass Index)          77 97.7  F (36.5  C) (Oral) 99% 19.61 kg/m2         Blood Pressure from Last 3 Encounters:   12/08/17 122/58   07/24/17 130/72   07/13/17 144/68    Weight from Last 3 Encounters:   12/08/17 103 lb 12.8 oz (47.1 kg)   07/24/17 109 lb 9.6 oz (49.7 kg)   07/13/17 107 lb (48.5 kg)              Today, you had the following     No orders found for display         Today's Medication Changes          These changes are accurate as of: 12/8/17  2:25 PM.  If you have any questions, ask your nurse or doctor.               Start taking these medicines.        Dose/Directions    diclofenac 1 % Gel topical gel   Commonly known as:  VOLTAREN   Used for:  Primary osteoarthritis of both knees   Started by:  Braxton Boone MD        Apply 4 grams to knees three to four times daily using enclosed dosing card.   Quantity:  100 g   Refills:  5       guaiFENesin-dextromethorphan 100-10 MG/5ML syrup   Commonly known as:  ROBITUSSIN DM   Used for:  Viral URI with cough   Started by:  Braxton Boone MD        Dose:  5 mL   Take 5 mLs by mouth every 4 hours as needed for cough   Quantity:  236 mL   Refills:  0         Stop taking these medicines if you haven't already. Please contact your care team if you have questions.     loratadine 10 MG tablet   Commonly known as:  CLARITIN   Stopped by:  Braxton Boone MD                Where to get your medicines      These medications were sent to [facility] [address] [address]     Phone:  [phone]     diclofenac 1 % Gel topical gel    guaiFENesin-dextromethorphan 100-10 MG/5ML syrup                Primary Care Provider Office Phone # Fax #    Braxton Boone MD  [phone]       [address] [facility] [address]        Equal Access to Services     SAMANTA SWANSON : Kathi pollo goff kenton Guevara, wamisbahda luqcj, landonta kakiada suhail, elin mayendelbert pallavi. So [facility] [phone].    ATENCIÓN: Si habla español, tiene a rob disposición servicios gratuitos de asistencia lingüística. Llame al [phone].    We comply with applicable federal civil rights laws and [place] laws. We do not discriminate on the basis of race, color, national origin, age, disability, sex, sexual orientation, or gender identity.            Thank you!     Thank you for choosing [facility]  for your care. Our goal is always to provide you with excellent care. Hearing back from our patients is one way we can continue to improve our services. Please take a few minutes to complete the written survey that you may receive in the mail after your visit with us. Thank you!             Your Updated Medication List - Protect others around you: Learn how to safely use, store and throw away your medicines at www.disposemymeds.org.          This list is accurate as of: 12/8/17  2:25 PM.  Always use your most recent med list.                   Brand Name Dispense Instructions for use Diagnosis    amLODIPine 10 MG tablet    NORVASC    90 tablet    Take 1 tablet by mouth daily    HTN (hypertension), benign       aspirin 81 MG tablet      Take  by mouth daily.        calcium 600 MG tablet      Take 1 tablet by mouth daily        cholecalciferol 1000 UNIT tablet    vitamin D3     5 TABLETS DAILY        diclofenac 1 % Gel topical gel    VOLTAREN    100 g    Apply 4 grams to knees three to four times daily using enclosed dosing card.    Primary osteoarthritis of both knees       guaiFENesin-dextromethorphan 100-10 MG/5ML syrup    ROBITUSSIN DM    236 mL    Take 5 mLs by mouth every 4 hours as needed for cough    Viral URI with cough       lactobacillus  rhamnosus (GG) capsule     20 capsule    Take 1 capsule by mouth 2 times daily for 10 days    Diarrhea, unspecified type       levothyroxine 88 MCG tablet    SYNTHROID/LEVOTHROID    90 tablet    TAKE ONE TABLET BY MOUTH ONE TIME DAILY    Autoimmune hypothyroidism       losartan 100 MG tablet    COZAAR    90 tablet    Take 1 tablet by mouth daily.    HTN (hypertension), benign       metFORMIN 500 MG 24 hr tablet    GLUCOPHAGE-XR    360 tablet    TAKE 4 TABLETS BY MOUTH DAILY WITH BREAKFAST (OR 2 TABLETS IN THE MORNING & 2 TABLETS IN THE EVENING)    Type 2 diabetes mellitus without complication (H)       MULTIVITAMIN TABS   OR      1 TABLET DAILY        simvastatin 10 MG tablet    ZOCOR    90 tablet    TAKE ONE TABLET BY MOUTH AT BEDTIME    Hyperlipidemia LDL goal <100       traZODone 100 MG tablet    DESYREL    90 tablet    Take 1 tablet by mouth nightly as needed for sleep    Insomnia, unspecified type

## 2018-01-23 ENCOUNTER — OFFICE VISIT (OUTPATIENT)
Dept: URGENT CARE | Facility: URGENT CARE | Age: 73
End: 2018-01-23
Payer: COMMERCIAL

## 2018-01-23 VITALS
HEART RATE: 77 BPM | WEIGHT: 100.56 LBS | TEMPERATURE: 98.7 F | OXYGEN SATURATION: 98 % | BODY MASS INDEX: 19 KG/M2 | SYSTOLIC BLOOD PRESSURE: 120 MMHG | RESPIRATION RATE: 20 BRPM | DIASTOLIC BLOOD PRESSURE: 70 MMHG

## 2018-01-23 DIAGNOSIS — R09.82 POST-NASAL DRIP: ICD-10-CM

## 2018-01-23 DIAGNOSIS — R19.7 DIARRHEA, UNSPECIFIED TYPE: Primary | ICD-10-CM

## 2018-01-23 DIAGNOSIS — R05.9 COUGH: ICD-10-CM

## 2018-01-23 PROCEDURE — 99213 OFFICE O/P EST LOW 20 MIN: CPT | Performed by: FAMILY MEDICINE

## 2018-01-23 RX ORDER — CETIRIZINE HYDROCHLORIDE 10 MG/1
10 TABLET ORAL EVERY EVENING
Qty: 30 TABLET | Refills: 1 | Status: SHIPPED | OUTPATIENT
Start: 2018-01-23 | End: 2019-09-23

## 2018-01-23 RX ORDER — CODEINE PHOSPHATE AND GUAIFENESIN 10; 100 MG/5ML; MG/5ML
1 SOLUTION ORAL EVERY 4 HOURS PRN
Qty: 120 ML | Refills: 0 | Status: SHIPPED | OUTPATIENT
Start: 2018-01-23 | End: 2018-02-09

## 2018-01-23 RX ORDER — LORATADINE 10 MG/1
10 TABLET ORAL DAILY
COMMUNITY
End: 2018-01-23 | Stop reason: ALTCHOICE

## 2018-01-23 NOTE — MR AVS SNAPSHOT
"              After Visit Summary   1/23/2018    Gregoria Gan    MRN: [id number]           Patient Information     Date Of Birth          1945        Visit Information        Provider Department      1/23/2018 5:30 PM Neyda Banks MD [facility]        Today's Diagnoses     Diarrhea, unspecified type    -  1    Cough        Post-nasal drip           Follow-ups after your visit        Future tests that were ordered for you today     Open Future Orders        Priority Expected Expires Ordered    Enteric Bacteria and Virus Panel by MITCH Stool Routine  1/23/2019 1/23/2018            Who to contact     If you have questions or need follow up information about today's clinic visit or your schedule please contact [facility] directly at [phone].  Normal or non-critical lab and imaging results will be communicated to you by MyChart, letter or phone within 4 business days after the clinic has received the results. If you do not hear from us within 7 days, please contact the clinic through MyChart or phone. If you have a critical or abnormal lab result, we will notify you by phone as soon as possible.  Submit refill requests through [employer] or call your pharmacy and they will forward the refill request to us. Please allow 3 business days for your refill to be completed.          Additional Information About Your Visit        MyChart Information     [employer] lets you send messages to your doctor, view your test results, renew your prescriptions, schedule appointments and more. To sign up, go to [email or web address] . Click on \"Log in\" on the left side of the screen, which will take you to the Welcome page. Then click on \"Sign up Now\" on the right side of the page.     You will be asked to enter the access code listed below, as well as some personal information. Please follow the directions to create your username and password.     Your access code is: " [email or web address]  Expires: 3/5/2018  4:26 PM     Your access code will  in 90 days. If you need help or a new code, please call your [place] clinic or [phone].        Care EveryWhere ID     This is your Care EveryWhere ID. This could be used by other organizations to access your [place] medical records  [id number]        Your Vitals Were     Pulse Temperature Respirations Pulse Oximetry BMI (Body Mass Index)       77 98.7  F (37.1  C) (Oral) 20 98% 19 kg/m2        Blood Pressure from Last 3 Encounters:   18 120/70   17 122/58   17 130/72    Weight from Last 3 Encounters:   18 100 lb 9 oz (45.6 kg)   17 103 lb 12.8 oz (47.1 kg)   17 109 lb 9.6 oz (49.7 kg)                 Today's Medication Changes          These changes are accurate as of: 18  7:10 PM.  If you have any questions, ask your nurse or doctor.               Start taking these medicines.        Dose/Directions    cetirizine 10 MG tablet   Commonly known as:  zyrTEC   Used for:  Cough   Started by:  Neyda Banks MD        Dose:  10 mg   Take 1 tablet (10 mg) by mouth every evening   Quantity:  30 tablet   Refills:  1         Stop taking these medicines if you haven't already. Please contact your care team if you have questions.     loratadine 10 MG tablet   Commonly known as:  CLARITIN   Stopped by:  Neyda Banks MD                Where to get your medicines      These medications were sent to [facility] [address] [address]     Phone:  [phone]     cetirizine 10 MG tablet                Primary Care Provider Office Phone # Fax #    Braxton Boone [phone]       [address] [place] [address]        Equal Access to Services     SAMANTA SWANSON AH: Kathi Guevara, eliezer blair, elin ramirez la'aan ah. [facility] [phone].    ATENCIÓN:  Si habla leslee, tiene a rob disposición servicios gratuitos de asistencia lingüística. Tommie juarez [phone].    We comply with applicable federal civil rights laws and [place] laws. We do not discriminate on the basis of race, color, national origin, age, disability, sex, sexual orientation, or gender identity.            Thank you!     Thank you for choosing [facility]  for your care. Our goal is always to provide you with excellent care. Hearing back from our patients is one way we can continue to improve our services. Please take a few minutes to complete the written survey that you may receive in the mail after your visit with us. Thank you!             Your Updated Medication List - Protect others around you: Learn how to safely use, store and throw away your medicines at www.disposemymeds.org.          This list is accurate as of: 1/23/18  7:10 PM.  Always use your most recent med list.                   Brand Name Dispense Instructions for use Diagnosis    amLODIPine 10 MG tablet    NORVASC    90 tablet    Take 1 tablet by mouth daily    HTN (hypertension), benign       aspirin 81 MG tablet      Take  by mouth daily.        calcium 600 MG tablet      Take 1 tablet by mouth daily        cetirizine 10 MG tablet    zyrTEC    30 tablet    Take 1 tablet (10 mg) by mouth every evening    Cough       cholecalciferol 1000 UNIT tablet    vitamin D3     5 TABLETS DAILY        CULTURELLE PO           diclofenac 1 % Gel topical gel    VOLTAREN    100 g    Apply 4 grams to knees three to four times daily using enclosed dosing card.    Primary osteoarthritis of both knees       guaiFENesin-dextromethorphan 100-10 MG/5ML syrup    ROBITUSSIN DM    236 mL    Take 5 mLs by mouth every 4 hours as needed for cough    Viral URI with cough       levothyroxine 88 MCG tablet    SYNTHROID/LEVOTHROID    90 tablet    TAKE ONE TABLET BY MOUTH ONE TIME DAILY    Autoimmune hypothyroidism       losartan 100  MG tablet    COZAAR    90 tablet    Take 1 tablet by mouth daily.    HTN (hypertension), benign       metFORMIN 500 MG 24 hr tablet    GLUCOPHAGE-XR    360 tablet    TAKE 4 TABLETS BY MOUTH DAILY WITH BREAKFAST (OR 2 TABLETS IN THE MORNING & 2 TABLETS IN THE EVENING)    Type 2 diabetes mellitus without complication (H)       MULTIVITAMIN TABS   OR      1 TABLET DAILY        simvastatin 10 MG tablet    ZOCOR    90 tablet    TAKE ONE TABLET BY MOUTH AT BEDTIME    Hyperlipidemia LDL goal <100       traZODone 100 MG tablet    DESYREL    90 tablet    Take 1 tablet by mouth nightly as needed for sleep    Insomnia, unspecified type

## 2018-01-24 NOTE — NURSING NOTE
"Chief Complaint   Patient presents with     Cough     cough and diarrhea for past week     Diarrhea       Initial /70 (Cuff Size: Adult Regular)  Pulse 77  Temp 98.7  F (37.1  C) (Oral)  Resp 20  Wt 100 lb 9 oz (45.6 kg)  SpO2 98%  BMI 19 kg/m2 Estimated body mass index is 19 kg/(m^2) as calculated from the following:    Height as of 1/23/17: 5' 1\" (1.549 m).    Weight as of this encounter: 100 lb 9 oz (45.6 kg).  Medication Reconciliation: complete Brittany SILVAN    "

## 2018-01-24 NOTE — PROGRESS NOTES
SUBJECTIVE:  Gregoria Gan, a 72 year old female scheduled an appointment to discuss the following issues:     Diarrhea, unspecified type  Cough  Post-nasal drip     She is here with diarrhea for a week which she describes as watery in nature happens during the day   Has 2-3 stools every day  Denies any abd cramps , has been feeling fine otherwise  No h/o travel and no fever or chills or change in diet   She also has been noticing coughing for last 1 month which had gotten better with loratidine but cough has gotten worse again   She denies any productive cough or any wheezing symptoms   Has no chest tightness or wheezing symptoms     Past Medical History:   Diagnosis Date     CKD (chronic kidney disease) stage 3, GFR 30-59 ml/min      HTN (hypertension), benign      Other and unspecified hyperlipidemia      Type 2 diabetes, HbA1c goal < 7% (H)      Unspecified hypothyroidism       Past Surgical History:   Procedure Laterality Date     C NONSPECIFIC PROCEDURE  1996    left side saliva gland removal     C NONSPECIFIC PROCEDURE  2006    breast augmentation removal     C NONSPECIFIC PROCEDURE  1977    breast augmentation        Social History     Social History     Marital status:      Spouse name: N/A     Number of children: 2     Years of education: N/A     Occupational History      Retired     Social History Main Topics     Smoking status: Never Smoker     Smokeless tobacco: Never Used     Alcohol use No     Drug use: No     Sexual activity: Yes     Partners: Male     Other Topics Concern     Not on file     Social History Narrative        Current Outpatient Prescriptions   Medication Sig Dispense Refill     Lactobacillus Rhamnosus, GG, (CULTURELLE PO)        cetirizine (ZYRTEC) 10 MG tablet Take 1 tablet (10 mg) by mouth every evening 30 tablet 1     guaiFENesin-codeine (ROBITUSSIN AC) 100-10 MG/5ML SOLN solution Take 5 mLs by mouth every 4 hours as needed for cough 120 mL 0      guaiFENesin-dextromethorphan (ROBITUSSIN DM) 100-10 MG/5ML syrup Take 5 mLs by mouth every 4 hours as needed for cough 236 mL 0     diclofenac (VOLTAREN) 1 % GEL topical gel Apply 4 grams to knees three to four times daily using enclosed dosing card. 100 g 5     simvastatin (ZOCOR) 10 MG tablet TAKE ONE TABLET BY MOUTH AT BEDTIME  90 tablet 1     levothyroxine (SYNTHROID/LEVOTHROID) 88 MCG tablet TAKE ONE TABLET BY MOUTH ONE TIME DAILY  90 tablet 1     metFORMIN (GLUCOPHAGE-XR) 500 MG 24 hr tablet TAKE 4 TABLETS BY MOUTH DAILY WITH BREAKFAST (OR 2 TABLETS IN THE MORNING & 2 TABLETS IN THE EVENING) 360 tablet 1     traZODone (DESYREL) 100 MG tablet Take 1 tablet by mouth nightly as needed for sleep 90 tablet 3     losartan (COZAAR) 100 MG tablet Take 1 tablet by mouth daily. 90 tablet 1     amLODIPine (NORVASC) 10 MG tablet Take 1 tablet by mouth daily 90 tablet 2     calcium 600 MG tablet Take 1 tablet by mouth daily        aspirin 81 MG tablet Take  by mouth daily.       VITAMIN D 1000 UNIT OR TABS 5 TABLETS DAILY       MULTIVITAMIN TABS   OR 1 TABLET DAILY         Health Maintenance   Topic Date Due     ADVANCE DIRECTIVE PLANNING Q5 YRS  01/06/2017     HEMOGLOBIN Q1 YR  06/24/2017     A1C Q6 MO  01/20/2018     BMP Q1 YR  07/20/2018     LIPID MONITORING Q1 YEAR  07/20/2018     MICROALBUMIN Q1 YEAR  07/20/2018     FOOT EXAM Q1 YEAR  07/24/2018     FALL RISK ASSESSMENT  07/24/2018     TSH Q1 YEAR  09/06/2018     EYE EXAM Q1 YEAR  09/15/2018     COLONOSCOPY Q5 YR  12/18/2018     TSH W/ FREE T4 REFLEX Q2 YEAR  09/06/2019     MAMMO Q2 YR  09/06/2019     TETANUS IMMUNIZATION (SYSTEM ASSIGNED)  02/27/2022     INFLUENZA VACCINE (SYSTEM ASSIGNED)  Completed     DEXA SCAN SCREENING (SYSTEM ASSIGNED)  Completed     PNEUMOCOCCAL  Completed        ROS:  CONSTITUTIONAL:no fever, no chills or sweats, no excessive fatigue, no significant change in weight  CV: neg   RESP -cough  GI:  diarrhea   NEURO: neg   MSK - neg   Skin - neg    Pyschiatry-neg     OBJECTIVE:  /70 (Cuff Size: Adult Regular)  Pulse 77  Temp 98.7  F (37.1  C) (Oral)  Resp 20  Wt 100 lb 9 oz (45.6 kg)  SpO2 98%  BMI 19 kg/m2      EXAM:  GENERAL APPEARANCE: healthy, alert and no distress  EYES: EOMI,  PERRL  HENT: ear canals and TM's normal and nose and mouth without ulcers or lesions  RESP: lungs clear to auscultation - no rales, rhonchi or wheezes  CV: regular rates and rhythm, normal S1 S2, no S3 or S4 and no murmur, click or rub -  ABDOMEN:  soft, nontender, no HSM or masses and bowel sounds normal  PSYCH: mentation appears normal and affect normal/bright        ASSESSMENT/PLAN:  (Gregoria Coombs was seen today for cough and diarrhea.    Diagnoses and all orders for this visit:    Diarrhea, unspecified type  -     Enteric Bacteria and Virus Panel by [facility] Stool; Future    Cough  -     cetirizine (ZYRTEC) 10 MG tablet; Take 1 tablet (10 mg) by mouth every evening  -     guaiFENesin-codeine (ROBITUSSIN AC) 100-10 MG/5ML SOLN solution; Take 5 mLs by mouth every 4 hours as needed for cough    Post-nasal drip      Discussed with pt symptoms of cough seems to be from post nasal drip   Try above meds   As diarrhea continuing would consider doing stool test     Follow up if  symptoms fail to improve or worsens   Pt understood and agreed with plan         Neyda Banks MD

## 2018-01-27 DIAGNOSIS — R19.7 DIARRHEA, UNSPECIFIED TYPE: ICD-10-CM

## 2018-01-27 PROCEDURE — 87506 IADNA-DNA/RNA PROBE TQ 6-11: CPT | Performed by: INTERNAL MEDICINE

## 2018-02-09 ENCOUNTER — OFFICE VISIT (OUTPATIENT)
Dept: INTERNAL MEDICINE | Facility: CLINIC | Age: 73
End: 2018-02-09
Payer: COMMERCIAL

## 2018-02-09 VITALS
OXYGEN SATURATION: 100 % | SYSTOLIC BLOOD PRESSURE: 120 MMHG | HEIGHT: 62 IN | HEART RATE: 73 BPM | TEMPERATURE: 97.9 F | BODY MASS INDEX: 18.77 KG/M2 | DIASTOLIC BLOOD PRESSURE: 60 MMHG | WEIGHT: 102 LBS | RESPIRATION RATE: 20 BRPM

## 2018-02-09 DIAGNOSIS — I10 HTN (HYPERTENSION), BENIGN: ICD-10-CM

## 2018-02-09 DIAGNOSIS — R35.1 NOCTURIA: Primary | ICD-10-CM

## 2018-02-09 DIAGNOSIS — R63.4 WEIGHT LOSS: ICD-10-CM

## 2018-02-09 DIAGNOSIS — E11.29 TYPE 2 DIABETES MELLITUS WITH MICROALBUMINURIA, WITHOUT LONG-TERM CURRENT USE OF INSULIN (H): ICD-10-CM

## 2018-02-09 DIAGNOSIS — R80.9 TYPE 2 DIABETES MELLITUS WITH MICROALBUMINURIA, WITHOUT LONG-TERM CURRENT USE OF INSULIN (H): ICD-10-CM

## 2018-02-09 LAB
ALT SERPL W P-5'-P-CCNC: 16 U/L (ref 0–50)
ANION GAP SERPL CALCULATED.3IONS-SCNC: 7 MMOL/L (ref 3–14)
BUN SERPL-MCNC: 18 MG/DL (ref 7–30)
CALCIUM SERPL-MCNC: 10.1 MG/DL (ref 8.5–10.1)
CHLORIDE SERPL-SCNC: 104 MMOL/L (ref 94–109)
CHOLEST SERPL-MCNC: 152 MG/DL
CO2 SERPL-SCNC: 27 MMOL/L (ref 20–32)
CREAT SERPL-MCNC: 1 MG/DL (ref 0.52–1.04)
CREAT UR-MCNC: 101 MG/DL
GFR SERPL CREATININE-BSD FRML MDRD: 54 ML/MIN/1.7M2
GLUCOSE SERPL-MCNC: 126 MG/DL (ref 70–99)
HBA1C MFR BLD: 6.6 % (ref 4.3–6)
HDLC SERPL-MCNC: 56 MG/DL
LDLC SERPL CALC-MCNC: 52 MG/DL
MICROALBUMIN UR-MCNC: 39 MG/L
MICROALBUMIN/CREAT UR: 38.22 MG/G CR (ref 0–25)
NONHDLC SERPL-MCNC: 96 MG/DL
POTASSIUM SERPL-SCNC: 4.1 MMOL/L (ref 3.4–5.3)
SODIUM SERPL-SCNC: 138 MMOL/L (ref 133–144)
TRIGL SERPL-MCNC: 219 MG/DL

## 2018-02-09 PROCEDURE — 84460 ALANINE AMINO (ALT) (SGPT): CPT | Performed by: INTERNAL MEDICINE

## 2018-02-09 PROCEDURE — 80048 BASIC METABOLIC PNL TOTAL CA: CPT | Performed by: INTERNAL MEDICINE

## 2018-02-09 PROCEDURE — 83036 HEMOGLOBIN GLYCOSYLATED A1C: CPT | Performed by: INTERNAL MEDICINE

## 2018-02-09 PROCEDURE — 36415 COLL VENOUS BLD VENIPUNCTURE: CPT | Performed by: INTERNAL MEDICINE

## 2018-02-09 PROCEDURE — 82043 UR ALBUMIN QUANTITATIVE: CPT | Performed by: INTERNAL MEDICINE

## 2018-02-09 PROCEDURE — 99214 OFFICE O/P EST MOD 30 MIN: CPT | Performed by: INTERNAL MEDICINE

## 2018-02-09 PROCEDURE — 80061 LIPID PANEL: CPT | Performed by: INTERNAL MEDICINE

## 2018-02-09 RX ORDER — LOSARTAN POTASSIUM 100 MG/1
TABLET ORAL
Qty: 90 TABLET | Refills: 1 | Status: SHIPPED | OUTPATIENT
Start: 2018-02-09 | End: 2019-01-24

## 2018-02-09 RX ORDER — OXYBUTYNIN CHLORIDE 5 MG/1
5 TABLET ORAL EVERY EVENING
Qty: 30 TABLET | Refills: 1 | Status: SHIPPED | OUTPATIENT
Start: 2018-02-09 | End: 2018-05-11

## 2018-02-09 RX ORDER — AMLODIPINE BESYLATE 10 MG/1
10 TABLET ORAL DAILY
Qty: 90 TABLET | Refills: 1 | Status: SHIPPED | OUTPATIENT
Start: 2018-02-09 | End: 2018-05-25

## 2018-02-09 ASSESSMENT — ACTIVITIES OF DAILY LIVING (ADL)
I_NEED_ASSISTANCE_FOR_THE_FOLLOWING_DAILY_ACTIVITIES:: PREPARING MEALS
I_NEED_ASSISTANCE_FOR_THE_FOLLOWING_DAILY_ACTIVITIES:: SHOPPING
CURRENT_FUNCTION: SHOPPING REQUIRES ASSISTANCE
CURRENT_FUNCTION: PREPARING MEALS REQUIRES ASSISTANCE

## 2018-02-09 NOTE — NURSING NOTE
"Chief Complaint   Patient presents with     Gastrointestinal Problem     diarrhea, loose stools, no fever or chills, onset about 2 weeks ago     Insomnia     waking up in the night       Initial /60 (BP Location: Right arm, Patient Position: Sitting, Cuff Size: Adult Regular)  Pulse 73  Temp 97.9  F (36.6  C) (Oral)  Resp 20  Ht 5' 1.7\" (1.567 m)  Wt 102 lb (46.3 kg)  SpO2 100%  Breastfeeding? No  BMI 18.84 kg/m2 Estimated body mass index is 18.84 kg/(m^2) as calculated from the following:    Height as of this encounter: 5' 1.7\" (1.567 m).    Weight as of this encounter: 102 lb (46.3 kg).  Medication Reconciliation: complete     Health Maintenance Due   Topic Date Due     ADVANCE DIRECTIVE PLANNING Q5 YRS  01/06/2017     HEMOGLOBIN Q1 YR  06/24/2017     A1C Q6 MO  01/20/2018     Itzel Avery, Medical Assistant      "

## 2018-02-09 NOTE — LETTER
February 21, 2018      Gregoria Gan  [address] [address]        Dear ,    We are writing to inform you of your test results.    Your test results fall within the expected range(s) or remain unchanged from previous results.  Please continue with current treatment plan.  See at your follow up appointment in March.     Resulted Orders   Hemoglobin A1c   Result Value Ref Range    Hemoglobin A1C 6.6 (H) 4.3 - 6.0 %   Albumin Random Urine Quantitative with Creat Ratio   Result Value Ref Range    Creatinine Urine 101 mg/dL    Albumin Urine mg/L 39 mg/L    Albumin Urine mg/g Cr 38.22 (H) 0 - 25 mg/g Cr   ALT   Result Value Ref Range    ALT 16 0 - 50 U/L   Lipid panel reflex to direct LDL Fasting   Result Value Ref Range    Cholesterol 152 <200 mg/dL    Triglycerides 219 (H) <150 mg/dL      Comment:      Borderline high:  150-199 mg/dl  High:             200-499 mg/dl  Very high:       >499 mg/dl      HDL Cholesterol 56 >49 mg/dL    LDL Cholesterol Calculated 52 <100 mg/dL      Comment:      Desirable:       <100 mg/dl    Non HDL Cholesterol 96 <130 mg/dL   Basic metabolic panel   Result Value Ref Range    Sodium 138 133 - 144 mmol/L    Potassium 4.1 3.4 - 5.3 mmol/L    Chloride 104 94 - 109 mmol/L    Carbon Dioxide 27 20 - 32 mmol/L    Anion Gap 7 3 - 14 mmol/L    Glucose 126 (H) 70 - 99 mg/dL    Urea Nitrogen 18 7 - 30 mg/dL    Creatinine 1.00 0.52 - 1.04 mg/dL    GFR Estimate 54 (L) >60 mL/min/1.7m2      Comment:      Non  GFR Calc    GFR Estimate If Black 66 >60 mL/min/1.7m2      Comment:       GFR Calc    Calcium 10.1 8.5 - 10.1 mg/dL       If you have any questions or concerns, please call the clinic at the number listed above.       Sincerely,        Braxton Boone MD

## 2018-02-09 NOTE — PROGRESS NOTES
"  SUBJECTIVE:   Gregoria Gan is a 72 year old female who presents to clinic today for the following health issues:    Diarrhea    Duration: first onset was two weeks ago but has subsided    Description:       Consistency of stool: watery, runny and loose       Blood in stool: no        Number of loose stools past 24 hours: 1     Intensity:  mild    Accompanying signs and symptoms:       Fever: no        Nausea/vomitting: no        Abdominal pain: YES- some mild       Weight loss: no     History (recent antibiotics or travel/ill contacts/med changes/testing done): none    Precipitating or alleviating factors: None    Therapies tried and outcome: n/a      Discuss medications for the insomnia.  Trazodone doesn't work well. Sleep maintenance an issue.  Wakes at 1 am to use restroom then cannot fall back asleep. Feels the real issue is the need to use the restroom.     Weight loss  - feels appetite isn't good. Not that hungry.     Problem list and histories reviewed & adjusted, as indicated.  Additional history: as documented    Labs reviewed in EPIC    Reviewed and updated as needed this visit by clinical staff  Tobacco  Allergies       Reviewed and updated as needed this visit by Provider         ROS:  Constitutional, HEENT, cardiovascular, pulmonary, gi and gu systems are negative, except as otherwise noted.    OBJECTIVE:                                                    /60 (BP Location: Right arm, Patient Position: Sitting, Cuff Size: Adult Regular)  Pulse 73  Temp 97.9  F (36.6  C) (Oral)  Resp 20  Ht 5' 1.7\" (1.567 m)  Wt 102 lb (46.3 kg)  SpO2 100%  Breastfeeding? No  BMI 18.84 kg/m2  Body mass index is 18.84 kg/(m^2).  GENERAL APPEARANCE: alert and no distress  RESP: lungs clear to auscultation - no rales, rhonchi or wheezes  CV: regular rates and rhythm, normal S1 S2, no S3 or S4 and no murmur, click or rub    Diagnostic test results:  pending     ASSESSMENT/PLAN:                            "                         1. Nocturia  Try evening dose- cut back a bit on evening fluid intake.   - oxybutynin (DITROPAN) 5 MG tablet; Take 1 tablet (5 mg) by mouth every evening  Dispense: 30 tablet; Refill: 1    2. Weight loss  Mild, but she is petite to begin with. Recheck on f/u in 1 mo.   Focus on using in-between meal supplementation    3. Type 2 diabetes mellitus with microalbuminuria, without long-term current use of insulin (H)  Repeat labs- f/u 1 mo to discuss  - Hemoglobin A1c  - Albumin Random Urine Quantitative with Creat Ratio  - ALT  - Lipid panel reflex to direct LDL Fasting  - Basic metabolic panel      Braxton Boone MD  [facility]

## 2018-02-09 NOTE — MR AVS SNAPSHOT
"              After Visit Summary   2018    Melly Gan    MRN: [id number]           Patient Information     Date Of Birth          1945        Visit Information        Provider Department      2018 8:45 AM Braxton Boone MD; [employer] [employer] [facility]        Today's Diagnoses     Nocturia    -  1    Weight loss        Type 2 diabetes mellitus with microalbuminuria, without long-term current use of insulin (H)           Follow-ups after your visit        Who to contact     If you have questions or need follow up information about today's clinic visit or your schedule please contact [facility] directly at [phone].  Normal or non-critical lab and imaging results will be communicated to you by MyChart, letter or phone within 4 business days after the clinic has received the results. If you do not hear from us within 7 days, please contact the clinic through MyChart or phone. If you have a critical or abnormal lab result, we will notify you by phone as soon as possible.  Submit refill requests through Fortem or call your pharmacy and they will forward the refill request to us. Please allow 3 business days for your refill to be completed.          Additional Information About Your Visit        MyChart Information     Fortem lets you send messages to your doctor, view your test results, renew your prescriptions, schedule appointments and more. To sign up, go to [email or web address] . Click on \"Log in\" on the left side of the screen, which will take you to the Welcome page. Then click on \"Sign up Now\" on the right side of the page.     You will be asked to enter the access code listed below, as well as some personal information. Please follow the directions to create your username and password.     Your access code is: [email or web address]  Expires: 3/5/2018  4:26 PM     Your access code will  in 90 days. If you need help or a " "new code, please call your [facility] clinic or [phone].        Care EveryWhere ID     This is your Care EveryWhere ID. This could be used by other organizations to access your [facility] medical records  [id number]        Your Vitals Were     Pulse Temperature Respirations Height Pulse Oximetry Breastfeeding?    73 97.9  F (36.6  C) (Oral) 20 5' 1.7\" (1.567 m) 100% No    BMI (Body Mass Index)                   18.84 kg/m2            Blood Pressure from Last 3 Encounters:   02/09/18 120/60   01/23/18 120/70   12/08/17 122/58    Weight from Last 3 Encounters:   02/09/18 102 lb (46.3 kg)   01/23/18 100 lb 9 oz (45.6 kg)   12/08/17 103 lb 12.8 oz (47.1 kg)              We Performed the Following     Albumin Random Urine Quantitative with Creat Ratio     ALT     Basic metabolic panel     Hemoglobin A1c     Lipid panel reflex to direct LDL Fasting          Today's Medication Changes          These changes are accurate as of 2/9/18  9:38 AM.  If you have any questions, ask your nurse or doctor.               Start taking these medicines.        Dose/Directions    oxybutynin 5 MG tablet   Commonly known as:  DITROPAN   Used for:  Nocturia   Started by:  Braxton Boone MD        Dose:  5 mg   Take 1 tablet (5 mg) by mouth every evening   Quantity:  30 tablet   Refills:  1            Where to get your medicines      These medications were sent to [facility] [address] [address]     Phone:  [phone]     oxybutynin 5 MG tablet                Primary Care Provider Office Phone # Fax #    Braxton Boone [phone]       [address] [place] [phone]        Equal Access to Services     SAMANTA SWANSON AH: Kathi Guevara, wazofia lupb, qaybta kaalmada suhail, elin olivo. So [facility] [phone].    ATENCIÓN: Si habla español, tiene a rob disposición servicios gratuitos de " asistencia lingüística. Tommie al [phone].    We comply with applicable federal civil rights laws and [place] laws. We do not discriminate on the basis of race, color, national origin, age, disability, sex, sexual orientation, or gender identity.            Thank you!     Thank you for choosing [facility]  for your care. Our goal is always to provide you with excellent care. Hearing back from our patients is one way we can continue to improve our services. Please take a few minutes to complete the written survey that you may receive in the mail after your visit with us. Thank you!             Your Updated Medication List - Protect others around you: Learn how to safely use, store and throw away your medicines at www.disposemymeds.org.          This list is accurate as of 2/9/18  9:38 AM.  Always use your most recent med list.                   Brand Name Dispense Instructions for use Diagnosis    amLODIPine 10 MG tablet    NORVASC    90 tablet    Take 1 tablet by mouth daily    HTN (hypertension), benign       aspirin 81 MG tablet      Take  by mouth daily.        calcium 600 MG tablet      Take 1 tablet by mouth daily        cetirizine 10 MG tablet    zyrTEC    30 tablet    Take 1 tablet (10 mg) by mouth every evening    Cough       cholecalciferol 1000 UNIT tablet    vitamin D3     5 TABLETS DAILY        diclofenac 1 % Gel topical gel    VOLTAREN    100 g    Apply 4 grams to knees three to four times daily using enclosed dosing card.    Primary osteoarthritis of both knees       levothyroxine 88 MCG tablet    SYNTHROID/LEVOTHROID    90 tablet    TAKE ONE TABLET BY MOUTH ONE TIME DAILY    Autoimmune hypothyroidism       losartan 100 MG tablet    COZAAR    90 tablet    Take 1 tablet by mouth daily.    HTN (hypertension), benign       metFORMIN 500 MG 24 hr tablet    GLUCOPHAGE-XR    360 tablet    TAKE 4 TABLETS BY MOUTH DAILY WITH BREAKFAST (OR 2 TABLETS IN THE MORNING & 2 TABLETS IN  THE EVENING)    Type 2 diabetes mellitus without complication (H)       MULTIVITAMIN TABS   OR      1 TABLET DAILY        oxybutynin 5 MG tablet    DITROPAN    30 tablet    Take 1 tablet (5 mg) by mouth every evening    Nocturia       simvastatin 10 MG tablet    ZOCOR    90 tablet    TAKE ONE TABLET BY MOUTH AT BEDTIME    Hyperlipidemia LDL goal <100       traZODone 100 MG tablet    DESYREL    90 tablet    Take 1 tablet by mouth nightly as needed for sleep    Insomnia, unspecified type

## 2018-03-09 ENCOUNTER — OFFICE VISIT (OUTPATIENT)
Dept: INTERNAL MEDICINE | Facility: CLINIC | Age: 73
End: 2018-03-09
Payer: COMMERCIAL

## 2018-03-09 VITALS
DIASTOLIC BLOOD PRESSURE: 56 MMHG | WEIGHT: 103.1 LBS | RESPIRATION RATE: 12 BRPM | BODY MASS INDEX: 19.04 KG/M2 | HEART RATE: 69 BPM | TEMPERATURE: 98 F | SYSTOLIC BLOOD PRESSURE: 114 MMHG

## 2018-03-09 DIAGNOSIS — Z72.821 POOR SLEEP HYGIENE: ICD-10-CM

## 2018-03-09 DIAGNOSIS — R63.4 WEIGHT LOSS: ICD-10-CM

## 2018-03-09 DIAGNOSIS — R80.9 TYPE 2 DIABETES MELLITUS WITH MICROALBUMINURIA, WITHOUT LONG-TERM CURRENT USE OF INSULIN (H): Primary | ICD-10-CM

## 2018-03-09 DIAGNOSIS — E11.29 TYPE 2 DIABETES MELLITUS WITH MICROALBUMINURIA, WITHOUT LONG-TERM CURRENT USE OF INSULIN (H): Primary | ICD-10-CM

## 2018-03-09 PROCEDURE — 99214 OFFICE O/P EST MOD 30 MIN: CPT | Performed by: INTERNAL MEDICINE

## 2018-03-09 RX ORDER — MIRTAZAPINE 15 MG/1
15 TABLET, FILM COATED ORAL AT BEDTIME
Qty: 30 TABLET | Refills: 3 | Status: SHIPPED | OUTPATIENT
Start: 2018-03-09 | End: 2018-05-11

## 2018-03-09 NOTE — PROGRESS NOTES
SUBJECTIVE:   Gregoria Gan is a 73 year old female who presents to clinic today for the following health issues:      Diabetes Follow-up      Patient is checking blood sugars: not at all    Diabetic concerns: Losing weight     Symptoms of hypoglycemia (low blood sugar): none     Paresthesias (numbness or burning in feet) or sores: No     Date of last diabetic eye exam: within the last year    BP Readings from Last 2 Encounters:   03/09/18 114/56   02/09/18 120/60     Hemoglobin A1C (%)   Date Value   02/09/2018 6.6 (H)   07/20/2017 6.8 (H)     LDL Cholesterol Calculated (mg/dL)   Date Value   02/09/2018 52   07/20/2017 60     Hypertension Follow-up  BP Readings from Last 3 Encounters:   03/09/18 114/56   02/09/18 120/60   01/23/18 120/70        Outpatient blood pressures are not being checked.    Low Salt Diet: low salt      Amount of exercise or physical activity: None    Problems taking medications regularly: No    Medication side effects: none    Diet: regular (no restrictions)      Problem list and histories reviewed & adjusted, as indicated.  Additional history: as documented    Labs reviewed in EPIC    Reviewed and updated as needed this visit by clinical staff  Tobacco  Allergies  Meds       Reviewed and updated as needed this visit by Provider         ROS:  Constitutional, HEENT, cardiovascular, pulmonary, gi and gu systems are negative, except as otherwise noted.    OBJECTIVE:                                                    /56  Pulse 69  Temp 98  F (36.7  C) (Oral)  Resp 12  Wt 103 lb 1.6 oz (46.8 kg)  BMI 19.04 kg/m2  Body mass index is 19.04 kg/(m^2).  GENERAL APPEARANCE: alert, no distress and over weight  HENT: nose and mouth without ulcers or lesions and normal cephalic/atraumatic  NECK: no adenopathy, no asymmetry, masses, or scars and thyroid normal to palpation  RESP: lungs clear to auscultation - no rales, rhonchi or wheezes  CV: regular rates and rhythm, normal S1 S2, no S3  or S4 and no murmur, click or rub  MS: extremities normal- no gross deformities noted  SKIN: no suspicious lesions or rashes  Neuro: monofilament testing normal     Diagnostic test results:  Results for orders placed or performed in visit on 02/09/18   Hemoglobin A1c   Result Value Ref Range    Hemoglobin A1C 6.6 (H) 4.3 - 6.0 %   Albumin Random Urine Quantitative with Creat Ratio   Result Value Ref Range    Creatinine Urine 101 mg/dL    Albumin Urine mg/L 39 mg/L    Albumin Urine mg/g Cr 38.22 (H) 0 - 25 mg/g Cr   ALT   Result Value Ref Range    ALT 16 0 - 50 U/L   Lipid panel reflex to direct LDL Fasting   Result Value Ref Range    Cholesterol 152 <200 mg/dL    Triglycerides 219 (H) <150 mg/dL    HDL Cholesterol 56 >49 mg/dL    LDL Cholesterol Calculated 52 <100 mg/dL    Non HDL Cholesterol 96 <130 mg/dL   Basic metabolic panel   Result Value Ref Range    Sodium 138 133 - 144 mmol/L    Potassium 4.1 3.4 - 5.3 mmol/L    Chloride 104 94 - 109 mmol/L    Carbon Dioxide 27 20 - 32 mmol/L    Anion Gap 7 3 - 14 mmol/L    Glucose 126 (H) 70 - 99 mg/dL    Urea Nitrogen 18 7 - 30 mg/dL    Creatinine 1.00 0.52 - 1.04 mg/dL    GFR Estimate 54 (L) >60 mL/min/1.7m2    GFR Estimate If Black 66 >60 mL/min/1.7m2    Calcium 10.1 8.5 - 10.1 mg/dL         ASSESSMENT/PLAN:                                                    1. Type 2 diabetes mellitus with microalbuminuria, without long-term current use of insulin (H)  Overall stable control and in goal range.     2. Weight loss  3. Poor sleep hygiene  Try remeron for both   - mirtazapine (REMERON) 15 MG tablet; Take 1 tablet (15 mg) by mouth At Bedtime  Dispense: 30 tablet; Refill: 3    Braxton Boone MD  [facility]

## 2018-03-09 NOTE — MR AVS SNAPSHOT
"              After Visit Summary   3/9/2018    Melly Gan    MRN: [id number]           Patient Information     Date Of Birth          1945        Visit Information        Provider Department      3/9/2018 2:30 PM Braxton Boone MD; [employer] [employer] [facility]        Today's Diagnoses     Type 2 diabetes mellitus with microalbuminuria, without long-term current use of insulin (H)    -  1    Weight loss        Poor sleep hygiene           Follow-ups after your visit        Who to contact     If you have questions or need follow up information about today's clinic visit or your schedule please contact [facility] directly at [phone].  Normal or non-critical lab and imaging results will be communicated to you by MyChart, letter or phone within 4 business days after the clinic has received the results. If you do not hear from us within 7 days, please contact the clinic through MyChart or phone. If you have a critical or abnormal lab result, we will notify you by phone as soon as possible.  Submit refill requests through Uguru or call your pharmacy and they will forward the refill request to us. Please allow 3 business days for your refill to be completed.          Additional Information About Your Visit        MyChart Information     Uguru lets you send messages to your doctor, view your test results, renew your prescriptions, schedule appointments and more. To sign up, go to [email or web address] . Click on \"Log in\" on the left side of the screen, which will take you to the Welcome page. Then click on \"Sign up Now\" on the right side of the page.     You will be asked to enter the access code listed below, as well as some personal information. Please follow the directions to create your username and password.     Your access code is: [email or web address]  Expires: 2018  3:11 PM     Your access code will  in 90 days. If you need " help or a new code, please call your [facility] clinic or [phone].        Care EveryWhere ID     This is your Care EveryWhere ID. This could be used by other organizations to access your [facility] medical records  [id number]        Your Vitals Were     Pulse Temperature Respirations BMI (Body Mass Index)          69 98  F (36.7  C) (Oral) 12 19.04 kg/m2         Blood Pressure from Last 3 Encounters:   03/09/18 114/56   02/09/18 120/60   01/23/18 120/70    Weight from Last 3 Encounters:   03/09/18 103 lb 1.6 oz (46.8 kg)   02/09/18 102 lb (46.3 kg)   01/23/18 100 lb 9 oz (45.6 kg)              Today, you had the following     No orders found for display         Today's Medication Changes          These changes are accurate as of 3/9/18  3:11 PM.  If you have any questions, ask your nurse or doctor.               Start taking these medicines.        Dose/Directions    mirtazapine 15 MG tablet   Commonly known as:  REMERON   Used for:  Poor sleep hygiene, Weight loss   Started by:  Braxton Boone MD        Dose:  15 mg   Take 1 tablet (15 mg) by mouth At Bedtime   Quantity:  30 tablet   Refills:  3            Where to get your medicines      These medications were sent to [facility] [address] [address]     Phone:  [phone]     mirtazapine 15 MG tablet                Primary Care Provider Office Phone # Fax #    Braxton Boone [phone]       [address] [place] [phone]        Equal Access to Services     TROY SWANSON : Kathi Guevara, eliezer blair, qaybelin benitez. So [facility] [phone].    ATENCIÓN: Si habla español, tiene a rob disposición servicios gratuitos de asistencia lingüística. Llame al [phone].    We comply with applicable federal civil rights laws and [place] laws. We do not discriminate on the basis of race,  color, national origin, age, disability, sex, sexual orientation, or gender identity.            Thank you!     Thank you for choosing [facility]  for your care. Our goal is always to provide you with excellent care. Hearing back from our patients is one way we can continue to improve our services. Please take a few minutes to complete the written survey that you may receive in the mail after your visit with us. Thank you!             Your Updated Medication List - Protect others around you: Learn how to safely use, store and throw away your medicines at www.disposemymeds.org.          This list is accurate as of 3/9/18  3:11 PM.  Always use your most recent med list.                   Brand Name Dispense Instructions for use Diagnosis    amLODIPine 10 MG tablet    NORVASC    90 tablet    Take 1 tablet (10 mg) by mouth daily    HTN (hypertension), benign       aspirin 81 MG tablet      Take  by mouth daily.        calcium 600 MG tablet      Take 1 tablet by mouth daily        cetirizine 10 MG tablet    zyrTEC    30 tablet    Take 1 tablet (10 mg) by mouth every evening    Cough       cholecalciferol 1000 UNIT tablet    vitamin D3     5 TABLETS DAILY        diclofenac 1 % Gel topical gel    VOLTAREN    100 g    Apply 4 grams to knees three to four times daily using enclosed dosing card.    Primary osteoarthritis of both knees       levothyroxine 88 MCG tablet    SYNTHROID/LEVOTHROID    90 tablet    TAKE ONE TABLET BY MOUTH ONE TIME DAILY    Autoimmune hypothyroidism       losartan 100 MG tablet    COZAAR    90 tablet    Take 1 tablet by mouth daily.    HTN (hypertension), benign       metFORMIN 500 MG 24 hr tablet    GLUCOPHAGE-XR    360 tablet    TAKE 4 TABLETS BY MOUTH DAILY WITH BREAKFAST (OR 2 TABLETS IN THE MORNING & 2 TABLETS IN THE EVENING)    Type 2 diabetes mellitus without complication (H)       mirtazapine 15 MG tablet    REMERON    30 tablet    Take 1 tablet (15 mg) by mouth At  Bedtime    Poor sleep hygiene, Weight loss       MULTIVITAMIN TABS   OR      1 TABLET DAILY        oxybutynin 5 MG tablet    DITROPAN    30 tablet    Take 1 tablet (5 mg) by mouth every evening    Nocturia       simvastatin 10 MG tablet    ZOCOR    90 tablet    TAKE ONE TABLET BY MOUTH AT BEDTIME    Hyperlipidemia LDL goal <100       traZODone 100 MG tablet    DESYREL    90 tablet    Take 1 tablet by mouth nightly as needed for sleep    Insomnia, unspecified type

## 2018-03-17 DIAGNOSIS — E11.9 TYPE 2 DIABETES MELLITUS WITHOUT COMPLICATION (H): ICD-10-CM

## 2018-03-18 NOTE — TELEPHONE ENCOUNTER
Requested Prescriptions   Pending Prescriptions Disp Refills     metFORMIN (GLUCOPHAGE-XR) 500 MG 24 hr tablet [Pharmacy Med Name: MetFORMIN HCl ER Oral Tablet Extended Release 24 Hour 500 MG  Last Written Prescription Date:  09/06/2017  Last Fill Quantity: 360,  # refills: 1  Last office visit: 3/9/2018 with prescribing provider:  DEBBIE   Future Office Visit:   Next 5 appointments (look out 90 days)     May 11, 2018  2:00 PM CDT   SHORT with Braxton Boone MD   [facility] ([facility])    [address] [place] [id number] [phone]                  360 tablet 0     Sig: TAKE 4 TABLETS BY MOUTH DAILY WITH BREAKFAST (OR 2 TABLETS IN THE MORNING & 2 TABLETS IN THE EVENING)    Biguanide Agents Passed    3/17/2018  7:56 PM       Passed - Blood pressure less than 140/90 in past 6 months    BP Readings from Last 3 Encounters:   03/09/18 114/56   02/09/18 120/60   01/23/18 120/70                Passed - Patient has documented LDL within the past 12 mos.    Recent Labs   Lab Test  02/09/18   0945   LDL  52            Passed - Patient has had a Microalbumin in the past 12 mos.    Recent Labs   Lab Test  02/09/18 0945   MICROL  39   UMALCR  38.22*            Passed - Patient is age 10 or older       Passed - Patient has documented A1c within the specified period of time.    Recent Labs   Lab Test  02/09/18   0945   A1C  6.6*            Passed - Patient's CR is NOT>1.4 OR Patient's EGFR is NOT<45 within past 12 mos.    Recent Labs   Lab Test  02/09/18 0945   GFRESTIMATED  54*   GFRESTBLACK  66       Recent Labs   Lab Test  02/09/18   0945   CR  1.00            Passed - Patient does NOT have a diagnosis of CHF.       Passed - Patient is not pregnant       Passed - Patient has not had a positive pregnancy test within the past 12 mos.        Passed - Recent (6 mo) or future (30 days) visit within the authorizing provider's specialty    Patient had  "office visit in the last 6 months or has a visit in the next 30 days with authorizing provider or within the authorizing provider's specialty.  See \"Patient Info\" tab in inbasket, or \"Choose Columns\" in Meds & Orders section of the refill encounter.              "

## 2018-03-19 RX ORDER — METFORMIN HCL 500 MG
TABLET, EXTENDED RELEASE 24 HR ORAL
Qty: 360 TABLET | Refills: 1 | Status: SHIPPED | OUTPATIENT
Start: 2018-03-19 | End: 2018-07-31

## 2018-05-06 DIAGNOSIS — E78.5 HYPERLIPIDEMIA LDL GOAL <100: ICD-10-CM

## 2018-05-07 NOTE — TELEPHONE ENCOUNTER
"Requested Prescriptions   Pending Prescriptions Disp Refills     simvastatin (ZOCOR) 10 MG tablet [Pharmacy Med Name: Simvastatin Oral Tablet 10 MG] 90 tablet 0    Last Written Prescription Date:  11/08/17  Last Fill Quantity: 90,  # refills: 1   Last office visit: 3/9/2018 with prescribing provider:  03/09/18   Future Office Visit: 05/11/18  Next 5 appointments (look out 90 days)     May 11, 2018  2:00 PM CDT   SHORT with Braxton Boone MD   [facility]    [address] [place] [id number] [phone]                Sig: TAKE ONE TABLET BY MOUTH AT BEDTIME    Statins Protocol Passed    5/6/2018  9:36 AM       Passed - LDL on file in past 12 months    Recent Labs   Lab Test  02/09/18   0945   LDL  52            Passed - No abnormal creatine kinase in past 12 months    No lab results found.            Passed - Recent (12 mo) or future (30 days) visit within the authorizing provider's specialty    Patient had office visit in the last 12 months or has a visit in the next 30 days with authorizing provider or within the authorizing provider's specialty.  See \"Patient Info\" tab in inbasket, or \"Choose Columns\" in Meds & Orders section of the refill encounter.           Passed - Patient is age 18 or older       Passed - No active pregnancy on record       Passed - No positive pregnancy test in past 12 months        "

## 2018-05-08 RX ORDER — SIMVASTATIN 10 MG
TABLET ORAL
Qty: 90 TABLET | Refills: 2 | Status: SHIPPED | OUTPATIENT
Start: 2018-05-08 | End: 2019-01-20

## 2018-05-11 ENCOUNTER — OFFICE VISIT (OUTPATIENT)
Dept: INTERNAL MEDICINE | Facility: CLINIC | Age: 73
End: 2018-05-11
Payer: COMMERCIAL

## 2018-05-11 VITALS
BODY MASS INDEX: 18.93 KG/M2 | OXYGEN SATURATION: 98 % | HEART RATE: 69 BPM | DIASTOLIC BLOOD PRESSURE: 66 MMHG | SYSTOLIC BLOOD PRESSURE: 124 MMHG | TEMPERATURE: 98 F | WEIGHT: 102.5 LBS

## 2018-05-11 DIAGNOSIS — I10 HTN (HYPERTENSION), BENIGN: ICD-10-CM

## 2018-05-11 DIAGNOSIS — E06.3 AUTOIMMUNE HYPOTHYROIDISM: Primary | ICD-10-CM

## 2018-05-11 DIAGNOSIS — N18.30 CKD (CHRONIC KIDNEY DISEASE) STAGE 3, GFR 30-59 ML/MIN (H): ICD-10-CM

## 2018-05-11 LAB
HGB BLD-MCNC: 12.9 G/DL (ref 11.7–15.7)
TSH SERPL DL<=0.005 MIU/L-ACNC: 0.45 MU/L (ref 0.4–4)

## 2018-05-11 PROCEDURE — 85018 HEMOGLOBIN: CPT | Performed by: INTERNAL MEDICINE

## 2018-05-11 PROCEDURE — 84443 ASSAY THYROID STIM HORMONE: CPT | Performed by: INTERNAL MEDICINE

## 2018-05-11 PROCEDURE — 99214 OFFICE O/P EST MOD 30 MIN: CPT | Performed by: INTERNAL MEDICINE

## 2018-05-11 PROCEDURE — 36415 COLL VENOUS BLD VENIPUNCTURE: CPT | Performed by: INTERNAL MEDICINE

## 2018-05-11 NOTE — MR AVS SNAPSHOT
"              After Visit Summary   2018    Melly Gan    MRN: [id number]           Patient Information     Date Of Birth          1945        Visit Information        Provider Department      2018 1:45 PM Braxton Boone MD; [employer] [facility]        Today's Diagnoses     Autoimmune hypothyroidism    -  1    CKD (chronic kidney disease) stage 3, GFR 30-59 ml/min           Follow-ups after your visit        Who to contact     If you have questions or need follow up information about today's clinic visit or your schedule please contact [facility] directly at [phone].  Normal or non-critical lab and imaging results will be communicated to you by MyChart, letter or phone within 4 business days after the clinic has received the results. If you do not hear from us within 7 days, please contact the clinic through MyChart or phone. If you have a critical or abnormal lab result, we will notify you by phone as soon as possible.  Submit refill requests through [employer] or call your pharmacy and they will forward the refill request to us. Please allow 3 business days for your refill to be completed.          Additional Information About Your Visit        MyChart Information     [employer] lets you send messages to your doctor, view your test results, renew your prescriptions, schedule appointments and more. To sign up, go to [email or web address] . Click on \"Log in\" on the left side of the screen, which will take you to the Welcome page. Then click on \"Sign up Now\" on the right side of the page.     You will be asked to enter the access code listed below, as well as some personal information. Please follow the directions to create your username and password.     Your access code is: [email or web address]  Expires: 2018  4:11 PM     Your access code will  in 90 days. If you need help or a new code, please call your [place] " clinic or [phone].        Care EveryWhere ID     This is your Care EveryWhere ID. This could be used by other organizations to access your [place] medical records  [id number]        Your Vitals Were     Pulse Temperature Pulse Oximetry BMI (Body Mass Index)          69 98  F (36.7  C) (Oral) 98% 18.93 kg/m2         Blood Pressure from Last 3 Encounters:   05/11/18 124/66   03/09/18 114/56   02/09/18 120/60    Weight from Last 3 Encounters:   05/11/18 102 lb 8 oz (46.5 kg)   03/09/18 103 lb 1.6 oz (46.8 kg)   02/09/18 102 lb (46.3 kg)              We Performed the Following     Hemoglobin     TSH with free T4 reflex          Today's Medication Changes          These changes are accurate as of 5/11/18  2:40 PM.  If you have any questions, ask your nurse or doctor.               Stop taking these medicines if you haven't already. Please contact your care team if you have questions.     diclofenac 1 % Gel topical gel   Commonly known as:  VOLTAREN   Stopped by:  Braxton Boone MD           mirtazapine 15 MG tablet   Commonly known as:  REMERON   Stopped by:  Braxton Boone MD           oxybutynin 5 MG tablet   Commonly known as:  DITROPAN   Stopped by:  Braxton Boone MD                    Primary Care Provider Office Phone # Fax #    Braxton Boone [phone]       [address] [place] [address]        Equal Access to Services     [facility]: Hadii pollo ku hadasho Soomaali, waaxda luqadaha, qaybta kaalmada adeegyada, elin olivo. So [facility] [phone].    ATENCIÓN: Si kennla leslee, tiene a rob disposición servicios gratuitos de asistencia lingüística. Llame al [phone].    We comply with applicable federal civil rights laws and [place] laws. We do not discriminate on the basis of race, color, national origin, age, disability, sex, sexual orientation, or gender identity.            Thank you!     Thank you for choosing [facility]  for your care. Our goal is always to provide you with excellent care. Hearing back from our patients is one way we can continue to improve our services. Please take a few minutes to complete the written survey that you may receive in the mail after your visit with us. Thank you!             Your Updated Medication List - Protect others around you: Learn how to safely use, store and throw away your medicines at www.disposemymeds.org.          This list is accurate as of 5/11/18  2:40 PM.  Always use your most recent med list.                   Brand Name Dispense Instructions for use Diagnosis    amLODIPine 10 MG tablet    NORVASC    90 tablet    Take 1 tablet (10 mg) by mouth daily    HTN (hypertension), benign       aspirin 81 MG tablet      Take  by mouth daily.        calcium 600 MG tablet      Take 1 tablet by mouth daily        cetirizine 10 MG tablet    zyrTEC    30 tablet    Take 1 tablet (10 mg) by mouth every evening    Cough       cholecalciferol 1000 UNIT tablet    vitamin D3     5 TABLETS DAILY        levothyroxine 88 MCG tablet    SYNTHROID/LEVOTHROID    90 tablet    TAKE ONE TABLET BY MOUTH ONE TIME DAILY    Autoimmune hypothyroidism       losartan 100 MG tablet    COZAAR    90 tablet    Take 1 tablet by mouth daily.    HTN (hypertension), benign       metFORMIN 500 MG 24 hr tablet    GLUCOPHAGE-XR    360 tablet    TAKE 4 TABLETS BY MOUTH DAILY WITH BREAKFAST (OR 2 TABLETS IN THE MORNING & 2 TABLETS IN THE EVENING)    Type 2 diabetes mellitus without complication (H)       MULTIVITAMIN TABS   OR      1 TABLET DAILY        simvastatin 10 MG tablet    ZOCOR    90 tablet    TAKE ONE TABLET BY MOUTH AT BEDTIME    Hyperlipidemia LDL goal <100       traZODone 100 MG tablet    DESYREL    90 tablet    Take 1 tablet by mouth nightly as needed for sleep    Insomnia, unspecified type

## 2018-05-11 NOTE — PROGRESS NOTES
SUBJECTIVE:   Gregoria Gan is a 73 year old female who presents to clinic today for the following health issues:      Patient is here to follow up on her weight loss  Her present weight is within a half a pound of when she was seen previously.    Problem list and histories reviewed & adjusted, as indicated.  Additional history: as documented    Labs reviewed in EPIC    Reviewed and updated as needed this visit by clinical staff       Reviewed and updated as needed this visit by Provider         ROS:  Constitutional, HEENT, cardiovascular, pulmonary, gi and gu systems are negative, except as otherwise noted.    OBJECTIVE:                                                    /66  Pulse 69  Temp 98  F (36.7  C) (Oral)  Wt 102 lb 8 oz (46.5 kg)  SpO2 98%  BMI 18.93 kg/m2  Body mass index is 18.93 kg/(m^2).  GENERAL APPEARANCE: healthy, alert and no distress  HENT: nose and mouth without ulcers or lesions and normal cephalic/atraumatic  NECK: no adenopathy, no asymmetry, masses, or scars and thyroid normal to palpation  RESP: lungs clear to auscultation - no rales, rhonchi or wheezes  CV: regular rates and rhythm, normal S1 S2, no S3 or S4 and no murmur, click or rub  MS: extremities normal- no gross deformities noted  SKIN: no suspicious lesions or rashes    Diagnostic test results:  Diagnostic Test Results:  Results for orders placed or performed in visit on 05/11/18   Hemoglobin   Result Value Ref Range    Hemoglobin 12.9 11.7 - 15.7 g/dL   TSH with free T4 reflex   Result Value Ref Range    TSH 0.45 0.40 - 4.00 mU/L        ASSESSMENT/PLAN:                                                    1. Autoimmune hypothyroidism  TSH is near the border for her being a little hyperthyroid.  If we noticed that she is loses any additional weight I think one solution might be to decrease the dose of the levothyroxine.  - TSH with free T4 reflex    2. CKD (chronic kidney disease) stage 3, GFR 30-59 ml/min  -  Hemoglobin      Follow up with Provider - 6 mo      Braxton Boone MD  [facility]

## 2018-05-11 NOTE — LETTER
May 22, 2018      Gregoria Gan  [address] [address] [facility] [address]        Dear ,    We are writing to inform you of your test results.    Your test results fall within the expected range(s) or remain unchanged from previous results.  Please continue with current treatment plan.    Resulted Orders   Hemoglobin   Result Value Ref Range    Hemoglobin 12.9 11.7 - 15.7 g/dL   TSH with free T4 reflex   Result Value Ref Range    TSH 0.45 0.40 - 4.00 mU/L       If you have any questions or concerns, please call the clinic at the number listed above.       Sincerely,        Braxton Boone MD

## 2018-05-25 RX ORDER — AMLODIPINE BESYLATE 10 MG/1
10 TABLET ORAL DAILY
Qty: 90 TABLET | Refills: 1 | Status: SHIPPED | OUTPATIENT
Start: 2018-05-25 | End: 2018-12-20

## 2018-05-25 RX ORDER — LEVOTHYROXINE SODIUM 88 UG/1
88 TABLET ORAL DAILY
Qty: 90 TABLET | Refills: 1 | Status: SHIPPED | OUTPATIENT
Start: 2018-05-25 | End: 2019-03-04

## 2018-06-28 ENCOUNTER — OFFICE VISIT (OUTPATIENT)
Dept: INTERNAL MEDICINE | Facility: CLINIC | Age: 73
End: 2018-06-28
Payer: COMMERCIAL

## 2018-06-28 VITALS
SYSTOLIC BLOOD PRESSURE: 128 MMHG | RESPIRATION RATE: 14 BRPM | DIASTOLIC BLOOD PRESSURE: 64 MMHG | TEMPERATURE: 98.2 F | OXYGEN SATURATION: 99 % | WEIGHT: 103.7 LBS | BODY MASS INDEX: 19.15 KG/M2 | HEART RATE: 72 BPM

## 2018-06-28 DIAGNOSIS — K52.9 CHRONIC DIARRHEA: Primary | ICD-10-CM

## 2018-06-28 PROCEDURE — 99213 OFFICE O/P EST LOW 20 MIN: CPT | Performed by: INTERNAL MEDICINE

## 2018-06-28 NOTE — PATIENT INSTRUCTIONS
1. Ok to use anti-diarrheal medication of your choice  2. Try stopping the metformin for a week to see if this helps. After this please restart it

## 2018-06-28 NOTE — PROGRESS NOTES
SUBJECTIVE:   Gregoria Gan is a 73 year old female who presents to clinic today for the following health issues:    Diarrhea      Duration: 1 month    Description:       Consistency of stool: watery       Blood in stool: no        Number of loose stools past 24 hours: 3    Intensity:  moderate    Accompanying signs and symptoms:       Fever: no        Nausea/vomitting: no        Abdominal pain: no        Weight loss: no        Been having some fatigue.    History (recent antibiotics or travel/ill contacts/med changes/testing done): None    Precipitating or alleviating factors: None    Therapies tried and outcome: macho-helpful    She notes she will have a few loose stools and then have a normal stool.  There is not been any consistent loose frequent stools time and time again.    In addition the last month she has 2 other visits for diarrhea on the last 8 months.  In December she was seen given some probiotics and reports some improvement.  Then later in January was seen and no intervention was made.  This is not an uncommon complaint for her in the last year.    Problem list and histories reviewed & adjusted, as indicated.  Additional history: as documented    Labs reviewed in EPIC    Reviewed and updated as needed this visit by clinical staff       Reviewed and updated as needed this visit by Provider         ROS:  Constitutional, HEENT, cardiovascular, pulmonary, gi and gu systems are negative, except as otherwise noted.    OBJECTIVE:                                                    /64 (BP Location: Left arm, Patient Position: Chair, Cuff Size: Adult Regular)  Pulse 72  Temp 98.2  F (36.8  C) (Oral)  Resp 14  Wt 103 lb 11.2 oz (47 kg)  SpO2 99%  BMI 19.15 kg/m2  Body mass index is 19.15 kg/(m^2).  GENERAL APPEARANCE: alert and no distress  HENT: nose and mouth without ulcers or lesions  NECK: no adenopathy, no asymmetry, masses, or scars and thyroid normal to palpation  RESP: lungs clear to  auscultation - no rales, rhonchi or wheezes  CV: regular rates and rhythm, normal S1 S2, no S3 or S4 and no murmur, click or rub  ABDOMEN: soft, nontender, without hepatosplenomegaly or masses and bowel sounds normal    Diagnostic test results:  Pending     ASSESSMENT/PLAN:                                                    1. Chronic diarrhea  I am not too concerned about an infectious process here.  This appears more to be more chronic cause.  Will get fecal lactoferrin rule out inflammation.  I have also asked her to hold her metformin for up to a week to see if this plays any role.  Also encouraged her to continue to use over-the-counter medications such as Imodium to get relief.  If symptoms continue we could consider further studies.  - Fecal Lactoferrin; Future    Braxton Boone MD  [facility]

## 2018-06-28 NOTE — MR AVS SNAPSHOT
After Visit Summary   6/28/2018    Melly Gan    MRN: [id number]           Patient Information     Date Of Birth          1945        Visit Information        Provider Department      6/28/2018 10:25 AM Braxton Boone MD; [employer] [facility]        Today's Diagnoses     Chronic diarrhea    -  1      Care Instructions    1. Ok to use anti-diarrheal medication of your choice  2. Try stopping the metformin for a week to see if this helps. After this please restart it            Follow-ups after your visit        Future tests that were ordered for you today     Open Future Orders        Priority Expected Expires Ordered    Fecal Lactoferrin Routine  6/28/2019 6/28/2018            Who to contact     If you have questions or need follow up information about today's clinic visit or your schedule please contact [facility] directly at [phone].  Normal or non-critical lab and imaging results will be communicated to you by MyChart, letter or phone within 4 business days after the clinic has received the results. If you do not hear from us within 7 days, please contact the clinic through MyChart or phone. If you have a critical or abnormal lab result, we will notify you by phone as soon as possible.  Submit refill requests through [employer] or call your pharmacy and they will forward the refill request to us. Please allow 3 business days for your refill to be completed.          Additional Information About Your Visit        Care EveryWhere ID     This is your Care EveryWhere ID. This could be used by other organizations to access your [facility] [employer] medical records  [id number]        Your Vitals Were     Pulse Temperature Respirations Pulse Oximetry BMI (Body Mass Index)       72 98.2  F (36.8  C) (Oral) 14 99% 19.15 kg/m2        Blood Pressure from Last 3 Encounters:   06/28/18 128/64   05/11/18 124/66   03/09/18 114/56     Weight from Last 3 Encounters:   06/28/18 103 lb 11.2 oz (47 kg)   05/11/18 102 lb 8 oz (46.5 kg)   03/09/18 103 lb 1.6 oz (46.8 kg)               Primary Care Provider Office Phone # Fax #    Braxton Boone [phone]       [address] [facility] [address]        Equal Access to Services     SAMANTA SWANSON : Hadii aad ku hadasho Soomaali, waaxda luqadaha, qaybta kaalmada adeegyada, waxay idiin hayaan adeeg kharahellen lamichelle . So [facility] [phone].    ATENCIÓN: Si slava camilo, tiene a rob disposición servicios gratuitos de asistencia lingüística. Llame al [phone].    We comply with applicable federal civil rights laws and [place] laws. We do not discriminate on the basis of race, color, national origin, age, disability, sex, sexual orientation, or gender identity.            Thank you!     Thank you for choosing [facility]  for your care. Our goal is always to provide you with excellent care. Hearing back from our patients is one way we can continue to improve our services. Please take a few minutes to complete the written survey that you may receive in the mail after your visit with us. Thank you!             Your Updated Medication List - Protect others around you: Learn how to safely use, store and throw away your medicines at www.disposemymeds.org.          This list is accurate as of 6/28/18 11:26 AM.  Always use your most recent med list.                   Brand Name Dispense Instructions for use Diagnosis    amLODIPine 10 MG tablet    NORVASC    90 tablet    Take 1 tablet (10 mg) by mouth daily    HTN (hypertension), benign       aspirin 81 MG tablet      Take  by mouth daily.        calcium 600 MG tablet      Take 1 tablet by mouth daily        cetirizine 10 MG tablet    zyrTEC    30 tablet    Take 1 tablet (10 mg) by mouth every evening    Cough       cholecalciferol 1000 UNIT tablet    vitamin D3     5 TABLETS DAILY        levothyroxine 88 MCG  tablet    SYNTHROID/LEVOTHROID    90 tablet    Take 1 tablet (88 mcg) by mouth daily    Autoimmune hypothyroidism       losartan 100 MG tablet    COZAAR    90 tablet    Take 1 tablet by mouth daily.    HTN (hypertension), benign       metFORMIN 500 MG 24 hr tablet    GLUCOPHAGE-XR    360 tablet    TAKE 4 TABLETS BY MOUTH DAILY WITH BREAKFAST (OR 2 TABLETS IN THE MORNING & 2 TABLETS IN THE EVENING)    Type 2 diabetes mellitus without complication (H)       MULTIVITAMIN TABS   OR      1 TABLET DAILY        simvastatin 10 MG tablet    ZOCOR    90 tablet    TAKE ONE TABLET BY MOUTH AT BEDTIME    Hyperlipidemia LDL goal <100       traZODone 100 MG tablet    DESYREL    90 tablet    Take 1 tablet by mouth nightly as needed for sleep    Insomnia, unspecified type

## 2018-06-28 NOTE — LETTER
July 5, 2018      Gregoria Gan  [address] [facility] [address]        Dear ,    We are writing to inform you of your test results.    Your test results fall within the expected range(s) or remain unchanged from previous results.  Please continue with current treatment plan.    Resulted Orders   Fecal Lactoferrin   Result Value Ref Range    Fecal Lactoferrin Negative NEG^Negative      Comment:      Test may not be appropriate for immunocompromised patients.       If you have any questions or concerns, please call the clinic at the number listed above.       Sincerely,        Braxton Boone MD

## 2018-06-30 LAB — LACTOFERRIN STL QL IA: NEGATIVE

## 2018-06-30 PROCEDURE — 83630 LACTOFERRIN FECAL (QUAL): CPT | Performed by: INTERNAL MEDICINE

## 2018-07-17 ENCOUNTER — OFFICE VISIT (OUTPATIENT)
Dept: INTERNAL MEDICINE | Facility: CLINIC | Age: 73
End: 2018-07-17
Payer: COMMERCIAL

## 2018-07-17 VITALS
TEMPERATURE: 98.3 F | RESPIRATION RATE: 16 BRPM | HEART RATE: 60 BPM | HEIGHT: 62 IN | SYSTOLIC BLOOD PRESSURE: 112 MMHG | OXYGEN SATURATION: 97 % | DIASTOLIC BLOOD PRESSURE: 58 MMHG | BODY MASS INDEX: 19.38 KG/M2 | WEIGHT: 105.3 LBS

## 2018-07-17 DIAGNOSIS — R80.9 TYPE 2 DIABETES MELLITUS WITH MICROALBUMINURIA, WITHOUT LONG-TERM CURRENT USE OF INSULIN (H): Primary | ICD-10-CM

## 2018-07-17 DIAGNOSIS — E11.29 TYPE 2 DIABETES MELLITUS WITH MICROALBUMINURIA, WITHOUT LONG-TERM CURRENT USE OF INSULIN (H): Primary | ICD-10-CM

## 2018-07-17 DIAGNOSIS — R19.8 CHANGE IN BOWEL MOVEMENT: ICD-10-CM

## 2018-07-17 PROCEDURE — 99214 OFFICE O/P EST MOD 30 MIN: CPT | Performed by: INTERNAL MEDICINE

## 2018-07-17 NOTE — PROGRESS NOTES
"  SUBJECTIVE:   Gregoria Gan is a 73 year old female who presents to clinic today for the following health issues:    Patient was here on 6/28/2018 for diarrhea. Still continuing to have diarrhea inconstantly through out the day.  BM- normal 50% time, less so 50%  Stopped metformin for 1 week and symptoms resolved. Restarting the medication - sx recurred.     NERI, exertional chest pressure-she notes the symptoms sometimes when she gets up in the morning and goes to the bathroom to wash out.  She does not have symptoms though later on in the day.  She denies dyspnea on exertion or heaviness in the chest with normal activities walking around the house coming to this appointment etc. is only that morning.  When she gets up that she has any symptoms.     Problem list and histories reviewed & adjusted, as indicated.  Additional history: as documented    Labs reviewed in EPIC    Reviewed and updated as needed this visit by clinical staff  Allergies  Meds       Reviewed and updated as needed this visit by Provider         ROS:  Constitutional, HEENT, cardiovascular, pulmonary, gi and gu systems are negative, except as otherwise noted.    OBJECTIVE:                                                    /58  Pulse 60  Temp 98.3  F (36.8  C) (Oral)  Resp 16  Ht 5' 1.7\" (1.567 m)  Wt 105 lb 4.8 oz (47.8 kg)  SpO2 97%  BMI 19.45 kg/m2  Body mass index is 19.45 kg/(m^2).  GENERAL APPEARANCE: healthy, alert and no distress  RESP: lungs clear to auscultation - no rales, rhonchi or wheezes  CV: regular rates and rhythm, normal S1 S2, no S3 or S4 and no murmur, click or rub    Diagnostic test results:  none      ASSESSMENT/PLAN:                                                    1. Type 2 diabetes mellitus with microalbuminuria, without long-term current use of insulin (H)  2. Change in bowel movement  Seems her GI symptoms might be related to the metformin.  Stopping this did seem to resolve her looser stools.  I " still like to use a metformin but we can use a lower dose if needed.  She is off it right now for the last several days restart just 1-titrate upwards until she notices symptoms take as much as she tolerates up to 4 a day.  Follow-up in 1 month labs and visit    3.  Intermittent chest symptoms atypical  Monitor.  If these were more consistently noted throughout the day  I would consider stress testing but it appears present only in the morning and she exerts herself even more at other times and has no symptoms.      Braxton Boone MD  [facility]

## 2018-07-17 NOTE — MR AVS SNAPSHOT
"              After Visit Summary   7/17/2018    Melly Gan    MRN: [id number]           Patient Information     Date Of Birth          1945        Visit Information        Provider Department      7/17/2018 1:45 PM Braxton Boone MD; [employer] [facility]        Today's Diagnoses     Type 2 diabetes mellitus with microalbuminuria, without long-term current use of insulin (H)    -  1    Change in bowel movement          Care Instructions    Labs in August, visit afterwards          Follow-ups after your visit        Future tests that were ordered for you today     Open Future Orders        Priority Expected Expires Ordered    HEMOGLOBIN A1C Routine 8/17/2018 7/17/2019 7/17/2018            Who to contact     If you have questions or need follow up information about today's clinic visit or your schedule please contact [facility] directly at [phone].  Normal or non-critical lab and imaging results will be communicated to you by MyChart, letter or phone within 4 business days after the clinic has received the results. If you do not hear from us within 7 days, please contact the clinic through MyChart or phone. If you have a critical or abnormal lab result, we will notify you by phone as soon as possible.  Submit refill requests through [employer] or call your pharmacy and they will forward the refill request to us. Please allow 3 business days for your refill to be completed.          Additional Information About Your Visit        Care EveryWhere ID     This is your Care EveryWhere ID. This could be used by other organizations to access your [place] medical records  [id number]        Your Vitals Were     Pulse Temperature Respirations Height Pulse Oximetry BMI (Body Mass Index)    60 98.3  F (36.8  C) (Oral) 16 5' 1.7\" (1.567 m) 97% 19.45 kg/m2       Blood Pressure from Last 3 Encounters:   07/17/18 112/58   06/28/18 128/64 "   05/11/18 124/66    Weight from Last 3 Encounters:   07/17/18 105 lb 4.8 oz (47.8 kg)   06/28/18 103 lb 11.2 oz (47 kg)   05/11/18 102 lb 8 oz (46.5 kg)               Primary Care Provider Office Phone # Fax #    Braxton Boone [phone]       [address] [facility] [address]        Equal Access to Services     SAMANTA SWANSON : Hadii aad ku hadasho Soomaali, waaxda luqadaha, qaybta kaalmada adeegyada, waxay idiin hayaan adeeg kharash lamichelle olivo. So [facility] [phone].    ATENCIÓN: Si habla español, tiene a rob disposición servicios gratuitos de asistencia lingüística. [facility] [phone].    We comply with applicable federal civil rights laws and [place] laws. We do not discriminate on the basis of race, color, national origin, age, disability, sex, sexual orientation, or gender identity.            Thank you!     Thank you for choosing [facility]  for your care. Our goal is always to provide you with excellent care. Hearing back from our patients is one way we can continue to improve our services. Please take a few minutes to complete the written survey that you may receive in the mail after your visit with us. Thank you!             Your Updated Medication List - Protect others around you: Learn how to safely use, store and throw away your medicines at www.disposemymeds.org.          This list is accurate as of 7/17/18  2:45 PM.  Always use your most recent med list.                   Brand Name Dispense Instructions for use Diagnosis    amLODIPine 10 MG tablet    NORVASC    90 tablet    Take 1 tablet (10 mg) by mouth daily    HTN (hypertension), benign       aspirin 81 MG tablet      Take  by mouth daily.        calcium 600 MG tablet      Take 1 tablet by mouth daily        cetirizine 10 MG tablet    zyrTEC    30 tablet    Take 1 tablet (10 mg) by mouth every evening    Cough       cholecalciferol 1000 UNIT tablet    vitamin D3     5 TABLETS DAILY         levothyroxine 88 MCG tablet    SYNTHROID/LEVOTHROID    90 tablet    Take 1 tablet (88 mcg) by mouth daily    Autoimmune hypothyroidism       losartan 100 MG tablet    COZAAR    90 tablet    Take 1 tablet by mouth daily.    HTN (hypertension), benign       metFORMIN 500 MG 24 hr tablet    GLUCOPHAGE-XR    360 tablet    TAKE 4 TABLETS BY MOUTH DAILY WITH BREAKFAST (OR 2 TABLETS IN THE MORNING & 2 TABLETS IN THE EVENING)    Type 2 diabetes mellitus without complication (H)       MULTIVITAMIN TABS   OR      1 TABLET DAILY        simvastatin 10 MG tablet    ZOCOR    90 tablet    TAKE ONE TABLET BY MOUTH AT BEDTIME    Hyperlipidemia LDL goal <100       traZODone 100 MG tablet    DESYREL    90 tablet    Take 1 tablet by mouth nightly as needed for sleep    Insomnia, unspecified type

## 2018-07-31 ENCOUNTER — TELEPHONE (OUTPATIENT)
Dept: INTERNAL MEDICINE | Facility: CLINIC | Age: 73
End: 2018-07-31

## 2018-07-31 DIAGNOSIS — R80.9 TYPE 2 DIABETES MELLITUS WITH MICROALBUMINURIA, WITHOUT LONG-TERM CURRENT USE OF INSULIN (H): Primary | ICD-10-CM

## 2018-07-31 DIAGNOSIS — E11.29 TYPE 2 DIABETES MELLITUS WITH MICROALBUMINURIA, WITHOUT LONG-TERM CURRENT USE OF INSULIN (H): Primary | ICD-10-CM

## 2018-07-31 NOTE — TELEPHONE ENCOUNTER
Patient's  called and stated that patient stopped metFORMIN (GLUCOPHAGE-XR) 500 MG 24 hr tablet as requested from office visit with pcp on 07/17 to see if diarrhea would stop, which it did .Patient was to resume medication after a week of stopping and once med was started back up patient started to have diarrhea again. Patients  is wanting a call back to see what the next step should be.

## 2018-07-31 NOTE — TELEPHONE ENCOUNTER
Spoke with patient's .  Diarrhea occurred again with metformin, even at low dose of 500 mg (XR tab) once daily.    Discussed options.  Will stay off metformin (added to 'allergy' list), and will try once-daily Tradjenta, which appears to be most-favorable DPP-4 inhibitor for patient's insurance.  Script sent to preferred pharmacy, follow-up with Dr. Boone as planned in 1 month.

## 2018-07-31 NOTE — TELEPHONE ENCOUNTER
Per PCP notes from 7/17 OV, pt needs new Rx for metformin, lower dose d/t diarrhea.   Jessica Coronado RN

## 2018-07-31 NOTE — TELEPHONE ENCOUNTER
Patient really needs to talk to Dr. Boone after his return as in his absence I am unable to determine clinical course suggested

## 2018-08-18 ENCOUNTER — TRANSFERRED RECORDS (OUTPATIENT)
Dept: HEALTH INFORMATION MANAGEMENT | Facility: CLINIC | Age: 73
End: 2018-08-18

## 2018-08-18 DIAGNOSIS — E11.29 TYPE 2 DIABETES MELLITUS WITH MICROALBUMINURIA, WITHOUT LONG-TERM CURRENT USE OF INSULIN (H): ICD-10-CM

## 2018-08-18 DIAGNOSIS — R80.9 TYPE 2 DIABETES MELLITUS WITH MICROALBUMINURIA, WITHOUT LONG-TERM CURRENT USE OF INSULIN (H): ICD-10-CM

## 2018-08-18 LAB
HBA1C MFR BLD: 7.8 % (ref 0–5.6)
HEMOCCULT STL QL IA: NEGATIVE

## 2018-08-18 PROCEDURE — 83036 HEMOGLOBIN GLYCOSYLATED A1C: CPT | Performed by: INTERNAL MEDICINE

## 2018-08-18 PROCEDURE — 36415 COLL VENOUS BLD VENIPUNCTURE: CPT | Performed by: INTERNAL MEDICINE

## 2018-08-20 ENCOUNTER — OFFICE VISIT (OUTPATIENT)
Dept: INTERNAL MEDICINE | Facility: CLINIC | Age: 73
End: 2018-08-20
Payer: COMMERCIAL

## 2018-08-20 VITALS
HEART RATE: 71 BPM | OXYGEN SATURATION: 97 % | BODY MASS INDEX: 19.98 KG/M2 | DIASTOLIC BLOOD PRESSURE: 60 MMHG | RESPIRATION RATE: 12 BRPM | TEMPERATURE: 97.8 F | WEIGHT: 108.2 LBS | SYSTOLIC BLOOD PRESSURE: 110 MMHG

## 2018-08-20 DIAGNOSIS — R80.9 TYPE 2 DIABETES MELLITUS WITH MICROALBUMINURIA, WITHOUT LONG-TERM CURRENT USE OF INSULIN (H): ICD-10-CM

## 2018-08-20 DIAGNOSIS — E11.29 TYPE 2 DIABETES MELLITUS WITH MICROALBUMINURIA, WITHOUT LONG-TERM CURRENT USE OF INSULIN (H): ICD-10-CM

## 2018-08-20 PROCEDURE — 99213 OFFICE O/P EST LOW 20 MIN: CPT | Performed by: INTERNAL MEDICINE

## 2018-08-20 NOTE — MR AVS SNAPSHOT
After Visit Summary   8/20/2018    Melly Gan    MRN: [id number]           Patient Information     Date Of Birth          1945        Visit Information        Provider Department      8/20/2018 10:25 AM Braxton Boone MD; [employer] [facility]        Today's Diagnoses     Type 2 diabetes mellitus with microalbuminuria, without long-term current use of insulin (H)           Follow-ups after your visit        Who to contact     If you have questions or need follow up information about today's clinic visit or your schedule please contact [facility] directly at [phone].  Normal or non-critical lab and imaging results will be communicated to you by MyChart, letter or phone within 4 business days after the clinic has received the results. If you do not hear from us within 7 days, please contact the clinic through MyChart or phone. If you have a critical or abnormal lab result, we will notify you by phone as soon as possible.  Submit refill requests through [employer] or call your pharmacy and they will forward the refill request to us. Please allow 3 business days for your refill to be completed.          Additional Information About Your Visit        Care EveryWhere ID     This is your Care EveryWhere ID. This could be used by other organizations to access your [place] medical records  [id number]        Your Vitals Were     Pulse Temperature Respirations Pulse Oximetry BMI (Body Mass Index)       71 97.8  F (36.6  C) (Oral) 12 97% 19.98 kg/m2        Blood Pressure from Last 3 Encounters:   08/20/18 110/60   07/17/18 112/58   06/28/18 128/64    Weight from Last 3 Encounters:   08/20/18 108 lb 3.2 oz (49.1 kg)   07/17/18 105 lb 4.8 oz (47.8 kg)   06/28/18 103 lb 11.2 oz (47 kg)              Today, you had the following     No orders found for display       Primary Care Provider Office Phone # Fax #    Braxton Boone,  [phone]       [address] [facility] [address]        Equal Access to Services     SAMANTA SWANSON : Hadii aad ku hadsinaijohann Guevara, rolandoyamil palmaggieha, dasha rosairving jang, elin marcelain hayaadelbert lopezguillermo grullon laJimhellen olivo. So [facility] [phone].    ATENCIÓN: Si habla español, tiene a rob disposición servicios gratuitos de asistencia lingüística. Llame al [phone].    We comply with applicable federal civil rights laws and [place] laws. We do not discriminate on the basis of race, color, national origin, age, disability, sex, sexual orientation, or gender identity.            Thank you!     Thank you for choosing [facility]  for your care. Our goal is always to provide you with excellent care. Hearing back from our patients is one way we can continue to improve our services. Please take a few minutes to complete the written survey that you may receive in the mail after your visit with us. Thank you!             Your Updated Medication List - Protect others around you: Learn how to safely use, store and throw away your medicines at www.disposemymeds.org.          This list is accurate as of 8/20/18 11:11 AM.  Always use your most recent med list.                   Brand Name Dispense Instructions for use Diagnosis    amLODIPine 10 MG tablet    NORVASC    90 tablet    Take 1 tablet (10 mg) by mouth daily    HTN (hypertension), benign       aspirin 81 MG tablet      Take  by mouth daily.        calcium 600 MG tablet      Take 1 tablet by mouth daily        cetirizine 10 MG tablet    zyrTEC    30 tablet    Take 1 tablet (10 mg) by mouth every evening    Cough       cholecalciferol 1000 UNIT tablet    vitamin D3     5 TABLETS DAILY        levothyroxine 88 MCG tablet    SYNTHROID/LEVOTHROID    90 tablet    Take 1 tablet (88 mcg) by mouth daily    Autoimmune hypothyroidism       linagliptin 5 MG Tabs tablet    TRADJENTA    30 tablet    Take 1 tablet (5 mg) by mouth daily     Type 2 diabetes mellitus with microalbuminuria, without long-term current use of insulin (H)       losartan 100 MG tablet    COZAAR    90 tablet    Take 1 tablet by mouth daily.    HTN (hypertension), benign       MULTIVITAMIN TABS   OR      1 TABLET DAILY        simvastatin 10 MG tablet    ZOCOR    90 tablet    TAKE ONE TABLET BY MOUTH AT BEDTIME    Hyperlipidemia LDL goal <100       traZODone 100 MG tablet    DESYREL    90 tablet    Take 1 tablet by mouth nightly as needed for sleep    Insomnia, unspecified type

## 2018-08-20 NOTE — PROGRESS NOTES
SUBJECTIVE:   Gregoria Gan is a 73 year old female who presents to clinic today for the following health issues:      Diabetes Follow-up  She reports that since switching from the metformin which was causing significant amount of diarrhea to the Tradjenta that her glucose values really have not changed.  She admits that she only tests every several days at most.  A1c noted below a large percentage of the time she was on the med form and versus off all meds entirely due to side effects and only recently did she start the Tradjenta.    Patient is checking blood sugars: once daily.  Results are as follows:         am - 130's    Diabetic concerns: None     Symptoms of hypoglycemia (low blood sugar): none     Paresthesias (numbness or burning in feet) or sores: No     Date of last diabetic eye exam: Last year    BP Readings from Last 2 Encounters:   07/17/18 112/58   06/28/18 128/64     Hemoglobin A1C (%)   Date Value   08/18/2018 7.8 (H)   02/09/2018 6.6 (H)     LDL Cholesterol Calculated (mg/dL)   Date Value   02/09/2018 52   07/20/2017 60       Diabetes Management Resources    Amount of exercise or physical activity: None    Problems taking medications regularly: No    Medication side effects: none    Diet: regular (no restrictions)    Problem list and histories reviewed & adjusted, as indicated.  Additional history: as documented    Labs reviewed in EPIC    Reviewed and updated as needed this visit by clinical staff       Reviewed and updated as needed this visit by Provider         ROS:  Constitutional, HEENT, cardiovascular, pulmonary, gi and gu systems are negative, except as otherwise noted.    OBJECTIVE:                                                    /60  Pulse 71  Temp 97.8  F (36.6  C) (Oral)  Resp 12  Wt 108 lb 3.2 oz (49.1 kg)  SpO2 97%  BMI 19.98 kg/m2  Body mass index is 19.98 kg/(m^2).  GENERAL APPEARANCE: alert and no distress  RESP: lungs clear to auscultation - no rales, rhonchi  or wheezes  CV: regular rates and rhythm, normal S1 S2, no S3 or S4 and no murmur, click or rub    Diagnostic test results:  none      ASSESSMENT/PLAN:                                                    1. Type 2 diabetes mellitus with microalbuminuria, without long-term current use of insulin (H)  -Follow-up in several weeks.  Patient given a logbook today to record her numbers so that we can review this upon her follow-up           Braxton Boone MD  [facility]

## 2018-08-31 DIAGNOSIS — G47.00 INSOMNIA, UNSPECIFIED TYPE: ICD-10-CM

## 2018-08-31 NOTE — TELEPHONE ENCOUNTER
"Requested Prescriptions   Pending Prescriptions Disp Refills     traZODone (DESYREL) 100 MG tablet [Pharmacy Med Name: TraZODone HCl Oral Tablet 100 MG] 90 tablet 2    Last Written Prescription Date:  7/26/2017  Last Fill Quantity: 90,  # refills: 3   Last office visit: 8/20/2018 with prescribing provider:  8/20/2018   Future Office Visit:   Next 5 appointments (look out 90 days)     Sep 20, 2018 10:40 AM CDT   [facility]    [address] [address] [phone]                  Sig: TAKE ONE TABLET BY MOUTH IN THE EVENING AS NEEDED FOR STOMACH    Serotonin Modulators Passed    8/31/2018  8:40 AM       Passed - Recent (12 mo) or future (30 days) visit within the authorizing provider's specialty    Patient had office visit in the last 12 months or has a visit in the next 30 days with authorizing provider or within the authorizing provider's specialty.  See \"Patient Info\" tab in inbasket, or \"Choose Columns\" in Meds & Orders section of the refill encounter.           Passed - Patient is age 18 or older       Passed - No active pregnancy on record       Passed - No positive pregnancy test in past 12 months          "

## 2018-09-04 RX ORDER — TRAZODONE HYDROCHLORIDE 100 MG/1
TABLET ORAL
Qty: 90 TABLET | Refills: 3 | Status: SHIPPED | OUTPATIENT
Start: 2018-09-04 | End: 2019-09-29

## 2018-09-18 ENCOUNTER — TRANSFERRED RECORDS (OUTPATIENT)
Dept: HEALTH INFORMATION MANAGEMENT | Facility: CLINIC | Age: 73
End: 2018-09-18

## 2018-09-20 ENCOUNTER — OFFICE VISIT (OUTPATIENT)
Dept: INTERNAL MEDICINE | Facility: CLINIC | Age: 73
End: 2018-09-20
Payer: COMMERCIAL

## 2018-09-20 VITALS
RESPIRATION RATE: 12 BRPM | HEART RATE: 82 BPM | BODY MASS INDEX: 20.56 KG/M2 | DIASTOLIC BLOOD PRESSURE: 58 MMHG | WEIGHT: 111.3 LBS | SYSTOLIC BLOOD PRESSURE: 134 MMHG | TEMPERATURE: 98.3 F | OXYGEN SATURATION: 98 %

## 2018-09-20 DIAGNOSIS — E11.29 TYPE 2 DIABETES MELLITUS WITH MICROALBUMINURIA, WITHOUT LONG-TERM CURRENT USE OF INSULIN (H): Primary | ICD-10-CM

## 2018-09-20 DIAGNOSIS — Z13.89 SCREENING FOR DIABETIC PERIPHERAL NEUROPATHY: ICD-10-CM

## 2018-09-20 DIAGNOSIS — R80.9 TYPE 2 DIABETES MELLITUS WITH MICROALBUMINURIA, WITHOUT LONG-TERM CURRENT USE OF INSULIN (H): Primary | ICD-10-CM

## 2018-09-20 PROCEDURE — 99207 C FOOT EXAM  NO CHARGE: CPT | Mod: 25 | Performed by: INTERNAL MEDICINE

## 2018-09-20 PROCEDURE — 99214 OFFICE O/P EST MOD 30 MIN: CPT | Performed by: INTERNAL MEDICINE

## 2018-09-20 RX ORDER — GLIMEPIRIDE 1 MG/1
1 TABLET ORAL
Qty: 30 TABLET | Refills: 2 | Status: SHIPPED | OUTPATIENT
Start: 2018-09-20 | End: 2018-10-23

## 2018-09-20 NOTE — MR AVS SNAPSHOT
After Visit Summary   9/20/2018    Melly Gan    MRN: [id number]           Patient Information     Date Of Birth          1945        Visit Information        Provider Department      9/20/2018 3:00 PM Braxton Boone MD; [employer] [facility]        Today's Diagnoses     Type 2 diabetes mellitus with microalbuminuria, without long-term current use of insulin (H)    -  1    Screening for diabetic peripheral neuropathy           Follow-ups after your visit        Follow-up notes from your care team     Return in about 4 weeks (around 10/18/2018) for Diabetes recheck.      Who to contact     If you have questions or need follow up information about today's clinic visit or your schedule please contact [facility] directly at [phone].  Normal or non-critical lab and imaging results will be communicated to you by MyChart, letter or phone within 4 business days after the clinic has received the results. If you do not hear from us within 7 days, please contact the clinic through MyChart or phone. If you have a critical or abnormal lab result, we will notify you by phone as soon as possible.  Submit refill requests through [employer] or call your pharmacy and they will forward the refill request to us. Please allow 3 business days for your refill to be completed.          Additional Information About Your Visit        Care EveryWhere ID     This is your Care EveryWhere ID. This could be used by other organizations to access your [place] medical records  [id number]        Your Vitals Were     Pulse Temperature Respirations Pulse Oximetry BMI (Body Mass Index)       82 98.3  F (36.8  C) (Oral) 12 98% 20.56 kg/m2        Blood Pressure from Last 3 Encounters:   09/20/18 134/58   08/20/18 110/60   07/17/18 112/58    Weight from Last 3 Encounters:   09/20/18 111 lb 4.8 oz (50.5 kg)   08/20/18 108 lb 3.2 oz (49.1 kg)   07/17/18  105 lb 4.8 oz (47.8 kg)              We Performed the Following     FOOT EXAM  NO CHARGE [90558.114]          Today's Medication Changes          These changes are accurate as of 9/20/18  3:42 PM.  If you have any questions, ask your nurse or doctor.               Start taking these medicines.        Dose/Directions    glimepiride 1 MG tablet   Commonly known as:  AMARYL   Used for:  Type 2 diabetes mellitus with microalbuminuria, without long-term current use of insulin (H)   Started by:  Braxton Boone MD        Dose:  1 mg   Take 1 tablet (1 mg) by mouth every morning (before breakfast)   Quantity:  30 tablet   Refills:  2         Stop taking these medicines if you haven't already. Please contact your care team if you have questions.     linagliptin 5 MG Tabs tablet   Commonly known as:  TRADJENTA   Stopped by:  Braxton Boone MD                Where to get your medicines      These medications were sent to [id number] - [facility] [address] [facility]  [address] [facility] [address]     Phone:  [phone]     glimepiride 1 MG tablet                Primary Care Provider Office Phone # Fax #    Braxton Boone [phone]       [address] [facility] [address]        Equal Access to Services     SAMANTA SWANSON AH: Hadchalo goff hadasho Soomaali, waaxda luqadaha, qaybta kaalmada adeegyada, waxay marcelain hayandersn janee olivo. So [facility] [phone].    ATENCIÓN: Si habla español, tiene a rob disposición servicios gratuitos de asistencia lingüística. ame al [phone].    We comply with applicable federal civil rights laws and [place] laws. We do not discriminate on the basis of race, color, national origin, age, disability, sex, sexual orientation, or gender identity.            Thank you!     Thank you for choosing [facility]  for your care. Our goal is always to provide you with excellent care. Hearing back from  our patients is one way we can continue to improve our services. Please take a few minutes to complete the written survey that you may receive in the mail after your visit with us. Thank you!             Your Updated Medication List - Protect others around you: Learn how to safely use, store and throw away your medicines at www.disposemymeds.org.          This list is accurate as of 9/20/18  3:42 PM.  Always use your most recent med list.                   Brand Name Dispense Instructions for use Diagnosis    amLODIPine 10 MG tablet    NORVASC    90 tablet    Take 1 tablet (10 mg) by mouth daily    HTN (hypertension), benign       aspirin 81 MG tablet      Take  by mouth daily.        calcium 600 MG tablet      Take 1 tablet by mouth daily        cetirizine 10 MG tablet    zyrTEC    30 tablet    Take 1 tablet (10 mg) by mouth every evening    Cough       cholecalciferol 1000 UNIT tablet    vitamin D3     5 TABLETS DAILY        glimepiride 1 MG tablet    AMARYL    30 tablet    Take 1 tablet (1 mg) by mouth every morning (before breakfast)    Type 2 diabetes mellitus with microalbuminuria, without long-term current use of insulin (H)       levothyroxine 88 MCG tablet    SYNTHROID/LEVOTHROID    90 tablet    Take 1 tablet (88 mcg) by mouth daily    Autoimmune hypothyroidism       losartan 100 MG tablet    COZAAR    90 tablet    Take 1 tablet by mouth daily.    HTN (hypertension), benign       MULTIVITAMIN TABS   OR      1 TABLET DAILY        simvastatin 10 MG tablet    ZOCOR    90 tablet    TAKE ONE TABLET BY MOUTH AT BEDTIME    Hyperlipidemia LDL goal <100       traZODone 100 MG tablet    DESYREL    90 tablet    TAKE ONE TABLET BY MOUTH IN THE EVENING AS NEEDED FOR STOMACH    Insomnia, unspecified type

## 2018-09-20 NOTE — PROGRESS NOTES
SUBJECTIVE:   Gregoria Gan is a 73 year old female who presents to clinic today for the following health issues:      Diabetes Follow-up    Patient is checking blood sugars: twice daily.    Blood sugar testing frequency justification: Uncontrolled diabetes  Results are as follows:         -She reports side effects from the Tradjenta.  Dizziness intermittent confusion just not feeling well.    Diabetic concerns: None     Symptoms of hypoglycemia (low blood sugar): none     Paresthesias (numbness or burning in feet) or sores: No     Date of last diabetic eye exam: Last year    BP Readings from Last 2 Encounters:   08/20/18 110/60   07/17/18 112/58     Hemoglobin A1C (%)   Date Value   08/18/2018 7.8 (H)   02/09/2018 6.6 (H)     LDL Cholesterol Calculated (mg/dL)   Date Value   02/09/2018 52   07/20/2017 60       Diabetes Management Resources    Amount of exercise or physical activity: None    Problems taking medications regularly: No    Medication side effects: none    Diet: regular (no restrictions)    Problem list and histories reviewed & adjusted, as indicated.  Additional history: as documented    Labs reviewed in EPIC    Reviewed and updated as needed this visit by clinical staff  Allergies  Meds       Reviewed and updated as needed this visit by Provider         ROS:  Constitutional, HEENT, cardiovascular, pulmonary, gi and gu systems are negative, except as otherwise noted.    OBJECTIVE:                                                    /58  Pulse 82  Temp 98.3  F (36.8  C) (Oral)  Resp 12  Wt 111 lb 4.8 oz (50.5 kg)  SpO2 98%  BMI 20.56 kg/m2  Body mass index is 20.56 kg/(m^2).  GENERAL APPEARANCE: healthy, alert and no distress  Several weeks  Diagnostic test results:  none      ASSESSMENT/PLAN:                                                    1. Type 2 diabetes mellitus with microalbuminuria, without long-term current use of insulin (H)  She has had problems tolerating medication.  Had  to discontinue metformin now Tradjenta.  Even on the Tradjenta alone her blood sugars are elevated.  Will start sulfonylurea and follow-up in several weeks  - glimepiride (AMARYL) 1 MG tablet; Take 1 tablet (1 mg) by mouth every morning (before breakfast)  Dispense: 30 tablet; Refill: 2    Follow up with Provider -several weeks    Braxton Boone MD  [facility]

## 2018-09-20 NOTE — Clinical Note
Please abstract the following data from this visit with this patient into the appropriate field in Epic:  FIT (approximately), by this group: [facility], results were negative on 8/18/2018.

## 2018-10-23 ENCOUNTER — OFFICE VISIT (OUTPATIENT)
Dept: INTERNAL MEDICINE | Facility: CLINIC | Age: 73
End: 2018-10-23
Payer: COMMERCIAL

## 2018-10-23 VITALS
HEART RATE: 65 BPM | OXYGEN SATURATION: 96 % | BODY MASS INDEX: 20.85 KG/M2 | TEMPERATURE: 97.9 F | WEIGHT: 112.9 LBS | DIASTOLIC BLOOD PRESSURE: 78 MMHG | SYSTOLIC BLOOD PRESSURE: 132 MMHG

## 2018-10-23 DIAGNOSIS — E11.29 TYPE 2 DIABETES MELLITUS WITH MICROALBUMINURIA, WITHOUT LONG-TERM CURRENT USE OF INSULIN (H): ICD-10-CM

## 2018-10-23 DIAGNOSIS — R80.9 TYPE 2 DIABETES MELLITUS WITH MICROALBUMINURIA, WITHOUT LONG-TERM CURRENT USE OF INSULIN (H): ICD-10-CM

## 2018-10-23 PROCEDURE — 99213 OFFICE O/P EST LOW 20 MIN: CPT | Performed by: INTERNAL MEDICINE

## 2018-10-23 RX ORDER — GLIMEPIRIDE 1 MG/1
TABLET ORAL
Qty: 30 TABLET | Refills: 2
Start: 2018-10-23 | End: 2018-12-20

## 2018-10-23 NOTE — PATIENT INSTRUCTIONS
- Recheck labs in mid to late December.   - Please come in for fasting labs, a few days prior to the appointment with me.  You may schedule appointments at our , through [employer], or by calling [phone].

## 2018-10-23 NOTE — MR AVS SNAPSHOT
"              After Visit Summary   10/23/2018    Gregoria Gan    MRN: [id number]           Patient Information     Date Of Birth          1945        Visit Information        Provider Department      10/23/2018 3:15 PM Braxton Boone MD; [place] [employer] [facility]        Today's Diagnoses     Type 2 diabetes mellitus with microalbuminuria, without long-term current use of insulin (H)          Care Instructions    - Recheck labs in mid to late December.   - Please come in for fasting labs, a few days prior to the appointment with me.  You may schedule appointments at our , through [employer], or by calling [phone].              Follow-ups after your visit        Who to contact     If you have questions or need follow up information about today's clinic visit or your schedule please contact [facility] directly at [phone].  Normal or non-critical lab and imaging results will be communicated to you by [employer], letter or phone within 4 business days after the clinic has received the results. If you do not hear from us within 7 days, please contact the clinic through [employer] or phone. If you have a critical or abnormal lab result, we will notify you by phone as soon as possible.  Submit refill requests through [employer] or call your pharmacy and they will forward the refill request to us. Please allow 3 business days for your refill to be completed.          Additional Information About Your Visit        [employer] Information     [employer] lets you send messages to your doctor, view your test results, renew your prescriptions, schedule appointments and more. To sign up, go to [email or web address] . Click on \"Log in\" on the left side of the screen, which will take you to the Welcome page. Then click on \"Sign up Now\" on the right side of the page.     You will be asked to enter the access code listed below, as well as some " personal information. Please follow the directions to create your username and password.     Your access code is: [email or web address]  Expires: 2019  3:52 PM     Your access code will  in 90 days. If you need help or a new code, please call your [place] clinic or [phone].        Care EveryWhere ID     This is your Care EveryWhere ID. This could be used by other organizations to access your [place] medical records  [id number]        Your Vitals Were     Pulse Temperature Pulse Oximetry BMI (Body Mass Index)          65 97.9  F (36.6  C) (Oral) 96% 20.85 kg/m2         Blood Pressure from Last 3 Encounters:   10/23/18 132/78   18 134/58   18 110/60    Weight from Last 3 Encounters:   10/23/18 112 lb 14.4 oz (51.2 kg)   18 111 lb 4.8 oz (50.5 kg)   18 108 lb 3.2 oz (49.1 kg)              Today, you had the following     No orders found for display         Today's Medication Changes          These changes are accurate as of 10/23/18  3:52 PM.  If you have any questions, ask your nurse or doctor.               These medicines have changed or have updated prescriptions.        Dose/Directions    glimepiride 1 MG tablet   Commonly known as:  AMARYL   This may have changed:    - how much to take  - how to take this  - when to take this  - additional instructions   Used for:  Type 2 diabetes mellitus with microalbuminuria, without long-term current use of insulin (H)   Changed by:  Braxton Boone MD        Take 1/2 tablet daily with breakfast   Quantity:  30 tablet   Refills:  2            Where to get your medicines      Some of these will need a paper prescription and others can be bought over the counter.  Ask your nurse if you have questions.     You don't need a prescription for these medications     glimepiride 1 MG tablet                Primary Care Provider Office Phone # Fax #    Braxton Boone [phone]       [address] [place] [id number]         Equal Access to Services     [facility] : Hadii aad ku hadsinaijohann Irmachris, wamisbahda luqadaha, qaybta kakiaelin rasheed. So [facility] [phone].    ATENCIÓN: Si habla español, tiene a rob disposición servicios gratuitos de asistencia lingüística. Tommie al [phone].    We comply with applicable federal civil rights laws and [place] laws. We do not discriminate on the basis of race, color, national origin, age, disability, sex, sexual orientation, or gender identity.            Thank you!     Thank you for choosing [facility]  for your care. Our goal is always to provide you with excellent care. Hearing back from our patients is one way we can continue to improve our services. Please take a few minutes to complete the written survey that you may receive in the mail after your visit with us. Thank you!             Your Updated Medication List - Protect others around you: Learn how to safely use, store and throw away your medicines at www.disposemymeds.org.          This list is accurate as of 10/23/18  3:52 PM.  Always use your most recent med list.                   Brand Name Dispense Instructions for use Diagnosis    amLODIPine 10 MG tablet    NORVASC    90 tablet    Take 1 tablet (10 mg) by mouth daily    HTN (hypertension), benign       aspirin 81 MG tablet      Take  by mouth daily.        calcium 600 MG tablet      Take 1 tablet by mouth daily        cetirizine 10 MG tablet    zyrTEC    30 tablet    Take 1 tablet (10 mg) by mouth every evening    Cough       cholecalciferol 1000 UNIT tablet    vitamin D3     5 TABLETS DAILY        glimepiride 1 MG tablet    AMARYL    30 tablet    Take 1/2 tablet daily with breakfast    Type 2 diabetes mellitus with microalbuminuria, without long-term current use of insulin (H)       levothyroxine 88 MCG tablet    SYNTHROID/LEVOTHROID    90 tablet    Take 1 tablet (88 mcg) by mouth daily    Autoimmune  hypothyroidism       losartan 100 MG tablet    COZAAR    90 tablet    Take 1 tablet by mouth daily.    HTN (hypertension), benign       MULTIVITAMIN TABS   OR      1 TABLET DAILY        simvastatin 10 MG tablet    ZOCOR    90 tablet    TAKE ONE TABLET BY MOUTH AT BEDTIME    Hyperlipidemia LDL goal <100       traZODone 100 MG tablet    DESYREL    90 tablet    TAKE ONE TABLET BY MOUTH IN THE EVENING AS NEEDED FOR STOMACH    Insomnia, unspecified type

## 2018-10-23 NOTE — PROGRESS NOTES
SUBJECTIVE:   Gregoria Gan is a 73 year old female who presents to clinic today for the following health issues:      Diabetes Follow-up   since starting the glimepiride she reports some hypoglycemia after taking.    Patient is checking blood sugars: daily.  Results range from 109 to 280    Diabetic concerns: None     Symptoms of hypoglycemia (low blood sugar): none     Paresthesias (numbness or burning in feet) or sores: No     Date of last diabetic eye exam: UTD    BP Readings from Last 2 Encounters:   09/20/18 134/58   08/20/18 110/60     Hemoglobin A1C (%)   Date Value   08/18/2018 7.8 (H)   02/09/2018 6.6 (H)     LDL Cholesterol Calculated (mg/dL)   Date Value   02/09/2018 52   07/20/2017 60       Diabetes Management Resources    Amount of exercise or physical activity: None    Problems taking medications regularly: No    Medication side effects: none    Diet: regular (no restrictions)    Problem list and histories reviewed & adjusted, as indicated.  Additional history: as documented    Labs reviewed in EPIC    Reviewed and updated as needed this visit by clinical staff  Allergies  Meds       Reviewed and updated as needed this visit by Provider         ROS:  Constitutional, HEENT, cardiovascular, pulmonary, gi and gu systems are negative, except as otherwise noted.    OBJECTIVE:                                                    /78 (BP Location: Right arm)  Pulse 65  Temp 97.9  F (36.6  C) (Oral)  Wt 112 lb 14.4 oz (51.2 kg)  SpO2 96%  BMI 20.85 kg/m2  Body mass index is 20.85 kg/(m^2).  GENERAL APPEARANCE: alert and no distress  HENT: ear canals and TM's normal and nose and mouth without ulcers or lesions  NECK: no adenopathy, no asymmetry, masses, or scars and thyroid normal to palpation  RESP: lungs clear to auscultation - no rales, rhonchi or wheezes  CV: regular rates and rhythm, normal S1 S2, no S3 or S4 and no murmur, click or rub  SKIN: no suspicious lesions or rashes  NEURO: Normal  strength and tone, mentation intact and speech normal    Diagnostic test results:  none     ASSESSMENT/PLAN:                                                    1. Type 2 diabetes mellitus with microalbuminuria, without long-term current use of insulin (H)  Try bit lower dose though I am concerned that her numbers will be actually higher.  She is really focus on diet.  Will repeat labs in December follow-up afterwards  - glimepiride (AMARYL) 1 MG tablet; Take 1/2 tablet daily with breakfast  Dispense: 30 tablet; Refill: 2      Braxton Boone MD  [facility]

## 2018-10-29 DIAGNOSIS — I10 HTN (HYPERTENSION), BENIGN: ICD-10-CM

## 2018-10-30 RX ORDER — AMLODIPINE BESYLATE 10 MG/1
TABLET ORAL
Qty: 90 TABLET | Refills: 0 | Status: SHIPPED | OUTPATIENT
Start: 2018-10-30 | End: 2019-03-04

## 2018-10-30 NOTE — TELEPHONE ENCOUNTER
"Requested Prescriptions   Pending Prescriptions Disp Refills     amLODIPine (NORVASC) 10 MG tablet [Pharmacy Med Name: AmLODIPine Besylate Oral Tablet 10 MG]  Last Written Prescription Date:  05/25/2018  Last Fill Quantity: 90,  # refills: 1   Last office visit: 10/23/2018 with prescribing provider: DEBBIE    Future Office Visit:   Next 5 appointments (look out 90 days)     Dec 20, 2018  2:00 PM CST   Office Visit with Braxton Boone MD   [facility]    [address] [place] [id number] [phone]                  90 tablet 0     Sig: Take 1 tablet (10 mg) by mouth daily    Calcium Channel Blockers Protocol  Passed    10/29/2018  3:09 PM       Passed - Blood pressure under 140/90 in past 12 months    BP Readings from Last 3 Encounters:   10/23/18 132/78   09/20/18 134/58   08/20/18 110/60                Passed - Recent (12 mo) or future (30 days) visit within the authorizing provider's specialty    Patient had office visit in the last 12 months or has a visit in the next 30 days with authorizing provider or within the authorizing provider's specialty.  See \"Patient Info\" tab in inbasket, or \"Choose Columns\" in Meds & Orders section of the refill encounter.             Passed - Patient is age 18 or older       Passed - No active pregnancy on record       Passed - Normal serum creatinine on file in past 12 months    Recent Labs   Lab Test  02/09/18   0945   CR  1.00            Passed - No positive pregnancy test in past 12 months          "

## 2018-11-26 ENCOUNTER — DOCUMENTATION ONLY (OUTPATIENT)
Dept: LAB | Facility: CLINIC | Age: 73
End: 2018-11-26

## 2018-11-26 DIAGNOSIS — R80.9 TYPE 2 DIABETES MELLITUS WITH MICROALBUMINURIA, WITHOUT LONG-TERM CURRENT USE OF INSULIN (H): Primary | ICD-10-CM

## 2018-11-26 DIAGNOSIS — E11.29 TYPE 2 DIABETES MELLITUS WITH MICROALBUMINURIA, WITHOUT LONG-TERM CURRENT USE OF INSULIN (H): Primary | ICD-10-CM

## 2018-12-17 DIAGNOSIS — R80.9 TYPE 2 DIABETES MELLITUS WITH MICROALBUMINURIA, WITHOUT LONG-TERM CURRENT USE OF INSULIN (H): ICD-10-CM

## 2018-12-17 DIAGNOSIS — E11.29 TYPE 2 DIABETES MELLITUS WITH MICROALBUMINURIA, WITHOUT LONG-TERM CURRENT USE OF INSULIN (H): ICD-10-CM

## 2018-12-17 LAB — HBA1C MFR BLD: 8.3 % (ref 0–5.6)

## 2018-12-17 PROCEDURE — 83036 HEMOGLOBIN GLYCOSYLATED A1C: CPT | Performed by: INTERNAL MEDICINE

## 2018-12-17 PROCEDURE — 36415 COLL VENOUS BLD VENIPUNCTURE: CPT | Performed by: INTERNAL MEDICINE

## 2018-12-20 ENCOUNTER — OFFICE VISIT (OUTPATIENT)
Dept: INTERNAL MEDICINE | Facility: CLINIC | Age: 73
End: 2018-12-20
Payer: COMMERCIAL

## 2018-12-20 VITALS
OXYGEN SATURATION: 95 % | DIASTOLIC BLOOD PRESSURE: 72 MMHG | SYSTOLIC BLOOD PRESSURE: 108 MMHG | BODY MASS INDEX: 22.22 KG/M2 | HEART RATE: 76 BPM | HEIGHT: 61 IN | TEMPERATURE: 98.1 F | WEIGHT: 117.7 LBS

## 2018-12-20 DIAGNOSIS — R80.9 TYPE 2 DIABETES MELLITUS WITH MICROALBUMINURIA, WITHOUT LONG-TERM CURRENT USE OF INSULIN (H): ICD-10-CM

## 2018-12-20 DIAGNOSIS — E11.29 TYPE 2 DIABETES MELLITUS WITH MICROALBUMINURIA, WITHOUT LONG-TERM CURRENT USE OF INSULIN (H): ICD-10-CM

## 2018-12-20 PROCEDURE — 99214 OFFICE O/P EST MOD 30 MIN: CPT | Performed by: INTERNAL MEDICINE

## 2018-12-20 RX ORDER — GLIMEPIRIDE 1 MG/1
1 TABLET ORAL
Qty: 30 TABLET | Refills: 2 | Status: SHIPPED | OUTPATIENT
Start: 2018-12-20 | End: 2019-03-04

## 2018-12-20 ASSESSMENT — MIFFLIN-ST. JEOR: SCORE: 971.26

## 2018-12-20 NOTE — PROGRESS NOTES
"    SUBJECTIVE:   Gregoria Gan is a 73 year old female who presents to clinic today for the following health issues:      Diabetes Follow-up    Patient is checking blood sugars: once daily.  Results are as follows:         am - 165    Diabetic concerns: None     Symptoms of hypoglycemia (low blood sugar): none     Paresthesias (numbness or burning in feet) or sores: No     Date of last diabetic eye exam: 09/18/2018    BP Readings from Last 2 Encounters:   12/20/18 108/72   10/23/18 132/78     Hemoglobin A1C (%)   Date Value   12/17/2018 8.3 (H)   08/18/2018 7.8 (H)     LDL Cholesterol Calculated (mg/dL)   Date Value   02/09/2018 52   07/20/2017 60       Diabetes Management Resources    Amount of exercise or physical activity: 4-5 days/week for an average of 15-30 minutes    Problems taking medications regularly: No    Medication side effects: none    Diet: low salt and low fat/cholesterol        Problem list and histories reviewed & adjusted, as indicated.  Additional history: as documented    Labs reviewed in EPIC    Reviewed and updated as needed this visit by clinical staff  Tobacco  Allergies  Meds       Reviewed and updated as needed this visit by Provider         ROS:  Constitutional, HEENT, cardiovascular, pulmonary, gi and gu systems are negative, except as otherwise noted.    OBJECTIVE:                                                    /72   Pulse 76   Temp 98.1  F (36.7  C) (Oral)   Ht 1.549 m (5' 1\")   Wt 53.4 kg (117 lb 11.2 oz)   SpO2 95%   BMI 22.24 kg/m    Body mass index is 22.24 kg/m .  GENERAL APPEARANCE: alert and no distress  HENT: nose and mouth without ulcers or lesions  NECK: no adenopathy, no asymmetry, masses, or scars and thyroid normal to palpation  RESP: lungs clear to auscultation - no rales, rhonchi or wheezes  CV: regular rates and rhythm, normal S1 S2, no S3 or S4 and no murmur, click or rub  MS: extremities normal- no gross deformities noted  SKIN: no suspicious " lesions or rashes  NEURO: Normal strength and tone, mentation intact and speech normal  DIABETIC FOOT EXAM: normal DP and PT pulses, no trophic changes or ulcerative lesions, normal sensory exam and normal monofilament exam    Diagnostic test results:  Results for orders placed or performed in visit on 12/17/18   Hemoglobin A1c   Result Value Ref Range    Hemoglobin A1C 8.3 (H) 0 - 5.6 %        ASSESSMENT/PLAN:                                                    1. Type 2 diabetes mellitus with microalbuminuria, without long-term current use of insulin (H)  Since having to d/c metformin due to gi side effects her a1c has worsened significantly. Her diet though isn't good even by her standards. Lots of sweets  Increase dose of glimepiride, f/u 3 mo   - glimepiride (AMARYL) 1 MG tablet; Take 1 tablet (1 mg) by mouth every morning (before breakfast)  Dispense: 30 tablet; Refill: 2  - Hemoglobin A1c; Future      Braxton Boone MD  [facility]

## 2019-01-20 DIAGNOSIS — E78.5 HYPERLIPIDEMIA LDL GOAL <100: ICD-10-CM

## 2019-01-21 RX ORDER — SIMVASTATIN 10 MG
TABLET ORAL
Qty: 90 TABLET | Refills: 0 | Status: SHIPPED | OUTPATIENT
Start: 2019-01-21 | End: 2019-03-04

## 2019-01-21 NOTE — TELEPHONE ENCOUNTER
"Requested Prescriptions   Pending Prescriptions Disp Refills     simvastatin (ZOCOR) 10 MG tablet [Pharmacy Med Name: Simvastatin Oral Tablet 10 MG] 90 tablet 1     Sig: TAKE ONE TABLET BY MOUTH AT BEDTIME    Statins Protocol Passed - 1/20/2019  2:37 PM       Passed - LDL on file in past 12 months    Recent Labs   Lab Test 02/09/18  0945   LDL 52            Passed - No abnormal creatine kinase in past 12 months    No lab results found.            Passed - Recent (12 mo) or future (30 days) visit within the authorizing provider's specialty    Patient had office visit in the last 12 months or has a visit in the next 30 days with authorizing provider or within the authorizing provider's specialty.  See \"Patient Info\" tab in inbasket, or \"Choose Columns\" in Meds & Orders section of the refill encounter.             Passed - Medication is active on med list       Passed - Patient is age 18 or older       Passed - No active pregnancy on record       Passed - No positive pregnancy test in past 12 months        Last Written Prescription Date:  05/08/2018  Last Fill Quantity: 90,  # refills: 2   Last office visit: 12/20/2018 with prescribing provider:  Dr Boone   Future Office Visit:      "

## 2019-01-24 DIAGNOSIS — I10 HTN (HYPERTENSION), BENIGN: ICD-10-CM

## 2019-01-24 RX ORDER — LOSARTAN POTASSIUM 100 MG/1
TABLET ORAL
Qty: 90 TABLET | Refills: 0 | Status: SHIPPED | OUTPATIENT
Start: 2019-01-24 | End: 2019-04-29

## 2019-01-24 RX ORDER — AMLODIPINE BESYLATE 10 MG/1
10 TABLET ORAL DAILY
Qty: 90 TABLET | Refills: 0 | Status: SHIPPED | OUTPATIENT
Start: 2019-01-24 | End: 2019-03-04

## 2019-01-24 NOTE — TELEPHONE ENCOUNTER
"Requested Prescriptions   Pending Prescriptions Disp Refills     losartan (COZAAR) 100 MG tablet [Pharmacy Med Name: Losartan Potassium Oral Tablet 100 MG]  Last Written Prescription Date:  02/09/2018  Last Fill Quantity: 90,  # refills: 01   Last Office Visit: 12/20/2018   Future Office Visit:      90 tablet 0     Sig: TAKE ONE TABLET BY MOUTH ONE TIME DAILY    Angiotensin-II Receptors Passed - 1/24/2019  9:19 AM       Passed - Blood pressure under 140/90 in past 12 months    BP Readings from Last 3 Encounters:   12/20/18 108/72   10/23/18 132/78   09/20/18 134/58                Passed - Recent (12 mo) or future (30 days) visit within the authorizing provider's specialty    Patient had office visit in the last 12 months or has a visit in the next 30 days with authorizing provider or within the authorizing provider's specialty.  See \"Patient Info\" tab in inbasket, or \"Choose Columns\" in Meds & Orders section of the refill encounter.             Passed - Medication is active on med list       Passed - Patient is age 18 or older       Passed - No active pregnancy on record       Passed - Normal serum creatinine on file in past 12 months    Recent Labs   Lab Test 02/09/18  0945   CR 1.00            Passed - Normal serum potassium on file in past 12 months    Recent Labs   Lab Test 02/09/18  0945   POTASSIUM 4.1                   Passed - No positive pregnancy test in past 12 months        amLODIPine (NORVASC) 10 MG tablet [Pharmacy Med Name: AmLODIPine Besylate Oral Tablet 10 MG]  Last Written Prescription Date:  10/30/2018  Last Fill Quantity: 90,  # refills: 0   Last Office Visit: 12/20/2018   Future Office Visit:      90 tablet 0     Sig: Take 1 tablet (10 mg) by mouth daily    Calcium Channel Blockers Protocol  Passed - 1/24/2019  9:19 AM       Passed - Blood pressure under 140/90 in past 12 months    BP Readings from Last 3 Encounters:   12/20/18 108/72   10/23/18 132/78   09/20/18 134/58                Passed - " "Recent (12 mo) or future (30 days) visit within the authorizing provider's specialty    Patient had office visit in the last 12 months or has a visit in the next 30 days with authorizing provider or within the authorizing provider's specialty.  See \"Patient Info\" tab in inbasket, or \"Choose Columns\" in Meds & Orders section of the refill encounter.             Passed - Medication is active on med list       Passed - Patient is age 18 or older       Passed - No active pregnancy on record       Passed - Normal serum creatinine on file in past 12 months    Recent Labs   Lab Test 02/09/18  0945   CR 1.00            Passed - No positive pregnancy test in past 12 months          "

## 2019-02-26 ENCOUNTER — DOCUMENTATION ONLY (OUTPATIENT)
Dept: LAB | Facility: CLINIC | Age: 74
End: 2019-02-26

## 2019-02-26 DIAGNOSIS — R80.9 TYPE 2 DIABETES MELLITUS WITH MICROALBUMINURIA, WITHOUT LONG-TERM CURRENT USE OF INSULIN (H): Primary | ICD-10-CM

## 2019-02-26 DIAGNOSIS — E11.29 TYPE 2 DIABETES MELLITUS WITH MICROALBUMINURIA, WITHOUT LONG-TERM CURRENT USE OF INSULIN (H): Primary | ICD-10-CM

## 2019-02-28 DIAGNOSIS — R80.9 TYPE 2 DIABETES MELLITUS WITH MICROALBUMINURIA, WITHOUT LONG-TERM CURRENT USE OF INSULIN (H): ICD-10-CM

## 2019-02-28 DIAGNOSIS — E11.29 TYPE 2 DIABETES MELLITUS WITH MICROALBUMINURIA, WITHOUT LONG-TERM CURRENT USE OF INSULIN (H): ICD-10-CM

## 2019-02-28 LAB
ANION GAP SERPL CALCULATED.3IONS-SCNC: 6 MMOL/L (ref 3–14)
BUN SERPL-MCNC: 23 MG/DL (ref 7–30)
CALCIUM SERPL-MCNC: 9.8 MG/DL (ref 8.5–10.1)
CHLORIDE SERPL-SCNC: 105 MMOL/L (ref 94–109)
CHOLEST SERPL-MCNC: 217 MG/DL
CO2 SERPL-SCNC: 27 MMOL/L (ref 20–32)
CREAT SERPL-MCNC: 1.18 MG/DL (ref 0.52–1.04)
CREAT UR-MCNC: 142 MG/DL
GFR SERPL CREATININE-BSD FRML MDRD: 45 ML/MIN/{1.73_M2}
GLUCOSE SERPL-MCNC: 178 MG/DL (ref 70–99)
HBA1C MFR BLD: 8.6 % (ref 0–5.6)
HDLC SERPL-MCNC: 46 MG/DL
LDLC SERPL CALC-MCNC: 117 MG/DL
MICROALBUMIN UR-MCNC: 125 MG/L
MICROALBUMIN/CREAT UR: 88.03 MG/G CR (ref 0–25)
NONHDLC SERPL-MCNC: 171 MG/DL
POTASSIUM SERPL-SCNC: 4 MMOL/L (ref 3.4–5.3)
SODIUM SERPL-SCNC: 138 MMOL/L (ref 133–144)
TRIGL SERPL-MCNC: 269 MG/DL

## 2019-02-28 PROCEDURE — 80061 LIPID PANEL: CPT | Performed by: INTERNAL MEDICINE

## 2019-02-28 PROCEDURE — 83036 HEMOGLOBIN GLYCOSYLATED A1C: CPT | Performed by: INTERNAL MEDICINE

## 2019-02-28 PROCEDURE — 36415 COLL VENOUS BLD VENIPUNCTURE: CPT | Performed by: INTERNAL MEDICINE

## 2019-02-28 PROCEDURE — 82043 UR ALBUMIN QUANTITATIVE: CPT | Performed by: INTERNAL MEDICINE

## 2019-02-28 PROCEDURE — 80048 BASIC METABOLIC PNL TOTAL CA: CPT | Performed by: INTERNAL MEDICINE

## 2019-03-04 ENCOUNTER — OFFICE VISIT (OUTPATIENT)
Dept: INTERNAL MEDICINE | Facility: CLINIC | Age: 74
End: 2019-03-04
Payer: COMMERCIAL

## 2019-03-04 VITALS
SYSTOLIC BLOOD PRESSURE: 116 MMHG | RESPIRATION RATE: 16 BRPM | DIASTOLIC BLOOD PRESSURE: 54 MMHG | HEIGHT: 61 IN | TEMPERATURE: 97.7 F | OXYGEN SATURATION: 97 % | HEART RATE: 67 BPM | WEIGHT: 114 LBS | BODY MASS INDEX: 21.52 KG/M2

## 2019-03-04 DIAGNOSIS — E06.3 AUTOIMMUNE HYPOTHYROIDISM: ICD-10-CM

## 2019-03-04 DIAGNOSIS — R80.9 TYPE 2 DIABETES MELLITUS WITH MICROALBUMINURIA, WITHOUT LONG-TERM CURRENT USE OF INSULIN (H): Primary | ICD-10-CM

## 2019-03-04 DIAGNOSIS — I10 HTN (HYPERTENSION), BENIGN: ICD-10-CM

## 2019-03-04 DIAGNOSIS — E78.5 HYPERLIPIDEMIA LDL GOAL <100: ICD-10-CM

## 2019-03-04 DIAGNOSIS — E11.29 TYPE 2 DIABETES MELLITUS WITH MICROALBUMINURIA, WITHOUT LONG-TERM CURRENT USE OF INSULIN (H): Primary | ICD-10-CM

## 2019-03-04 PROCEDURE — 99214 OFFICE O/P EST MOD 30 MIN: CPT | Performed by: INTERNAL MEDICINE

## 2019-03-04 RX ORDER — PIOGLITAZONEHYDROCHLORIDE 30 MG/1
30 TABLET ORAL DAILY
Qty: 90 TABLET | Refills: 1 | Status: SHIPPED | OUTPATIENT
Start: 2019-03-04 | End: 2019-08-20

## 2019-03-04 RX ORDER — GLIMEPIRIDE 1 MG/1
1 TABLET ORAL
Qty: 90 TABLET | Refills: 1 | Status: SHIPPED | OUTPATIENT
Start: 2019-03-04 | End: 2019-06-24

## 2019-03-04 RX ORDER — AMLODIPINE BESYLATE 10 MG/1
10 TABLET ORAL DAILY
Qty: 90 TABLET | Refills: 0 | Status: SHIPPED | OUTPATIENT
Start: 2019-03-04 | End: 2019-07-25

## 2019-03-04 RX ORDER — LEVOTHYROXINE SODIUM 88 UG/1
88 TABLET ORAL DAILY
Qty: 90 TABLET | Refills: 0 | Status: SHIPPED | OUTPATIENT
Start: 2019-03-04 | End: 2019-06-24

## 2019-03-04 RX ORDER — ATORVASTATIN CALCIUM 20 MG/1
20 TABLET, FILM COATED ORAL DAILY
Qty: 90 TABLET | Refills: 1 | Status: SHIPPED | OUTPATIENT
Start: 2019-03-04 | End: 2019-08-20

## 2019-03-04 ASSESSMENT — MIFFLIN-ST. JEOR: SCORE: 949.48

## 2019-03-04 NOTE — PROGRESS NOTES
"  SUBJECTIVE:   Gregoria Gan is a 74 year old female who presents to clinic today for the following health issues:    Diabetes Follow-up    Patient is checking blood sugars: once daily.  Results are as follows:         am - 143-186          suppertime - 134-290    Diabetic concerns: blood sugar frequently over 200     Symptoms of hypoglycemia (low blood sugar): none     Paresthesias (numbness or burning in feet) or sores: No     Date of last diabetic eye exam: 9/18/18    BP Readings from Last 2 Encounters:   03/04/19 116/54   12/20/18 108/72     Hemoglobin A1C (%)   Date Value   02/28/2019 8.6 (H)   12/17/2018 8.3 (H)     LDL Cholesterol Calculated (mg/dL)   Date Value   02/28/2019 117 (H)   02/09/2018 52       Diabetes Management Resources     Hyperlipidemia  - pt unable to say why her lipids have increased. No big change in diet or activity level.     Problem list and histories reviewed & adjusted, as indicated.  Additional history: as documented    Reviewed and updated as needed this visit by clinical staff  Tobacco  Allergies  Meds  Med Hx  Surg Hx  Fam Hx  Soc Hx      Reviewed and updated as needed this visit by Provider         ROS:  Constitutional, HEENT, cardiovascular, pulmonary, gi and gu systems are negative, except as otherwise noted.    OBJECTIVE:                                                    /54   Pulse 67   Temp 97.7  F (36.5  C) (Oral)   Resp 16   Ht 1.549 m (5' 1\")   Wt 51.7 kg (114 lb)   SpO2 97%   Breastfeeding? No   BMI 21.54 kg/m    Body mass index is 21.54 kg/m .  GENERAL APPEARANCE: alert, no distress and over weight  HENT: nose and mouth without ulcers or lesions and normal cephalic/atraumatic  NECK: no adenopathy, no asymmetry, masses, or scars and thyroid normal to palpation  RESP: lungs clear to auscultation - no rales, rhonchi or wheezes  CV: regular rates and rhythm, normal S1 S2, no S3 or S4 and no murmur, click or rub  ABDOMEN: soft, nontender, without " hepatosplenomegaly or masses and bowel sounds normal  MS: extremities normal- no gross deformities noted  SKIN: no suspicious lesions or rashes    Diagnostic test results:  No results found for this or any previous visit (from the past 24 hour(s)).     ASSESSMENT/PLAN:                                                    1.  Type 2 diabetes mellitus with microalbuminuria, without long-term current use of insulin (H)  Add actos  Focus on diet  - glimepiride (AMARYL) 1 MG tablet; Take 1 tablet (1 mg) by mouth every morning (before breakfast)  Dispense: 90 tablet; Refill: 1  - pioglitazone (ACTOS) 30 MG tablet; Take 1 tablet (30 mg) by mouth daily  Dispense: 90 tablet; Refill: 1  - Hemoglobin A1c; Future  - Lipid panel reflex to direct LDL Fasting; Future  - Basic metabolic panel; Future    3. Hyperlipidemia LDL goal <100  Switch statin given LDL level  - atorvastatin (LIPITOR) 20 MG tablet; Take 1 tablet (20 mg) by mouth daily  Dispense: 90 tablet; Refill: 1    4. Autoimmune hypothyroidism  - levothyroxine (SYNTHROID/LEVOTHROID) 88 MCG tablet; Take 1 tablet (88 mcg) by mouth daily  Dispense: 90 tablet; Refill: 0  - TSH with free T4 reflex; Future    5. HTN (hypertension), benign  - amLODIPine (NORVASC) 10 MG tablet; Take 1 tablet (10 mg) by mouth daily  Dispense: 90 tablet; Refill: 0           Braxton Boone MD  [facility]

## 2019-04-01 ENCOUNTER — TRANSFERRED RECORDS (OUTPATIENT)
Dept: MULTI SPECIALTY CLINIC | Facility: CLINIC | Age: 74
End: 2019-04-01

## 2019-04-19 ENCOUNTER — TELEPHONE (OUTPATIENT)
Dept: INTERNAL MEDICINE | Facility: CLINIC | Age: 74
End: 2019-04-19

## 2019-04-19 NOTE — TELEPHONE ENCOUNTER
Patient's  calling. Says patient has been so weak and sleepy. Also having dizziness. Yesterday the dizziness made her too weak, and she could not stand and fell. Did not hit her head. No known injuries. Unsure if chest pain or trouble breathing. Has to walk very slowly. Weak all over . No difficulty speaking or confusion. Patient slept rest of day after falling. Patient's  wanted to bring her to ER previously but she had said no so he came here to make her appt.      NURSING PLAN: Routed to provider Yes GIANNI    RECOMMENDED DISPOSITION:  To ED, another person to drive  Will comply with recommendation: Yes, pt.'s  wanted to take her to UC or wait a few days, advised ER and he did agree and said he would take her  If further questions/concerns or if symptoms do not improve, worsen or new symptoms develop, call your PCP or [facility] as soon as possible.      Guideline used:  Telephone Triage Protocols for Nurses, Fifth Edition, Lilly Valdez RN

## 2019-04-29 ENCOUNTER — OFFICE VISIT (OUTPATIENT)
Dept: INTERNAL MEDICINE | Facility: CLINIC | Age: 74
End: 2019-04-29
Payer: COMMERCIAL

## 2019-04-29 VITALS
SYSTOLIC BLOOD PRESSURE: 112 MMHG | HEART RATE: 65 BPM | OXYGEN SATURATION: 98 % | DIASTOLIC BLOOD PRESSURE: 52 MMHG | RESPIRATION RATE: 12 BRPM | BODY MASS INDEX: 21.92 KG/M2 | TEMPERATURE: 97.9 F | WEIGHT: 116 LBS

## 2019-04-29 DIAGNOSIS — I10 HTN (HYPERTENSION), BENIGN: ICD-10-CM

## 2019-04-29 DIAGNOSIS — N17.9 PRERENAL ACUTE RENAL FAILURE (H): Primary | ICD-10-CM

## 2019-04-29 DIAGNOSIS — E11.29 TYPE 2 DIABETES MELLITUS WITH MICROALBUMINURIA, WITHOUT LONG-TERM CURRENT USE OF INSULIN (H): ICD-10-CM

## 2019-04-29 DIAGNOSIS — N18.30 CKD (CHRONIC KIDNEY DISEASE) STAGE 3, GFR 30-59 ML/MIN (H): ICD-10-CM

## 2019-04-29 DIAGNOSIS — Z91.81 AT HIGH RISK FOR INJURY RELATED TO FALL: ICD-10-CM

## 2019-04-29 DIAGNOSIS — E06.3 AUTOIMMUNE HYPOTHYROIDISM: ICD-10-CM

## 2019-04-29 DIAGNOSIS — R80.9 TYPE 2 DIABETES MELLITUS WITH MICROALBUMINURIA, WITHOUT LONG-TERM CURRENT USE OF INSULIN (H): ICD-10-CM

## 2019-04-29 LAB
ANION GAP SERPL CALCULATED.3IONS-SCNC: 8 MMOL/L (ref 3–14)
BUN SERPL-MCNC: 30 MG/DL (ref 7–30)
CALCIUM SERPL-MCNC: 10.8 MG/DL (ref 8.5–10.1)
CHLORIDE SERPL-SCNC: 108 MMOL/L (ref 94–109)
CO2 SERPL-SCNC: 26 MMOL/L (ref 20–32)
CREAT SERPL-MCNC: 1.46 MG/DL (ref 0.52–1.04)
GFR SERPL CREATININE-BSD FRML MDRD: 35 ML/MIN/{1.73_M2}
GLUCOSE SERPL-MCNC: 167 MG/DL (ref 70–99)
POTASSIUM SERPL-SCNC: 4.2 MMOL/L (ref 3.4–5.3)
SODIUM SERPL-SCNC: 142 MMOL/L (ref 133–144)
T4 FREE SERPL-MCNC: 1.15 NG/DL (ref 0.76–1.46)
TSH SERPL DL<=0.005 MIU/L-ACNC: 13.77 MU/L (ref 0.4–4)

## 2019-04-29 PROCEDURE — 36415 COLL VENOUS BLD VENIPUNCTURE: CPT | Performed by: INTERNAL MEDICINE

## 2019-04-29 PROCEDURE — 80048 BASIC METABOLIC PNL TOTAL CA: CPT | Performed by: INTERNAL MEDICINE

## 2019-04-29 PROCEDURE — 84439 ASSAY OF FREE THYROXINE: CPT | Performed by: INTERNAL MEDICINE

## 2019-04-29 PROCEDURE — 84443 ASSAY THYROID STIM HORMONE: CPT | Performed by: INTERNAL MEDICINE

## 2019-04-29 PROCEDURE — 99214 OFFICE O/P EST MOD 30 MIN: CPT | Performed by: INTERNAL MEDICINE

## 2019-04-29 RX ORDER — LOSARTAN POTASSIUM 50 MG/1
TABLET ORAL
Qty: 90 TABLET | Refills: 1 | Status: SHIPPED | OUTPATIENT
Start: 2019-04-29 | End: 2019-10-22

## 2019-04-29 RX ORDER — SIMVASTATIN 10 MG
TABLET ORAL
Refills: 0 | COMMUNITY
Start: 2019-01-22 | End: 2019-04-29

## 2019-04-29 NOTE — PROGRESS NOTES
SUBJECTIVE:   Gregoria Gan is a 74 year old female who presents to clinic today for the following   health issues:    ED/UC Followup:    Facility:  [facility]   Date of visit: 4/19/2019  Reason for visit: Lightheadedness, Acute renal failure   Current Status: Dizziness is gone. Feeling fatigue still, not able to sleep.     Gregoria Gan is a Georgian speaking (video  used to translate) 74 y.o. female who presents to the emergency center for evaluation of lightheadedness. The patient presents with lightheadedness that started yesterday with some associated headache. She does also report some left upper arm and left shoulder pain for the past two weeks. She does report similar lightheadedness many years ago. The patient denies any spinning sensation, vomiting, diarrhea, vision changes, speech changes, chest pain, or fevers.      Additional history: as documented    Reviewed  and updated as needed this visit by clinical staff  Tobacco  Allergies  Meds  Med Hx  Surg Hx  Fam Hx  Soc Hx        Reviewed and updated as needed this visit by Provider         Labs reviewed in EPIC    ROS:  Constitutional, HEENT, cardiovascular, pulmonary, gi and gu systems are negative, except as otherwise noted.    OBJECTIVE:                                                    /52   Pulse 65   Temp 97.9  F (36.6  C) (Oral)   Resp 12   Wt 52.6 kg (116 lb)   SpO2 98%   Breastfeeding? No   BMI 21.92 kg/m    Body mass index is 21.92 kg/m .  GENERAL APPEARANCE: alert and no distress  HENT: nose and mouth without ulcers or lesions and normal cephalic/atraumatic  NECK: no adenopathy, no asymmetry, masses, or scars and thyroid normal to palpation  RESP: lungs clear to auscultation - no rales, rhonchi or wheezes  CV: regular rates and rhythm, normal S1 S2, no S3 or S4 and no murmur, click or rub  MS: extremities normal- no gross deformities noted  SKIN: no suspicious lesions or rashes  Neuro: Unable  to complete the 4 stage balance test.  Get up and go test is delayed  Diagnostic test results:  pending     ASSESSMENT/PLAN:                                                      Encounter Diagnoses   Name Primary?     Prerenal acute renal failure (H) Yes     CKD (chronic kidney disease) stage 3, GFR 30-59 ml/min (H)      Type 2 diabetes mellitus with microalbuminuria, without long-term current use of insulin (H)      Autoimmune hypothyroidism      HTN (hypertension), benign      -May be a little bit overzealous and her blood pressure control for her given her GFR decline and symptoms which both consistent with some prerenal failure.  Will cut back on her losartan to 50 mg to pop up her blood pressure little bit  Repeat lab work  Follow-up in June  Physical therapy for fall prevention     Follow up with Provider -June    Braxton Boone MD  [facility]

## 2019-05-07 ENCOUNTER — TELEPHONE (OUTPATIENT)
Dept: INTERNAL MEDICINE | Facility: CLINIC | Age: 74
End: 2019-05-07

## 2019-05-07 NOTE — TELEPHONE ENCOUNTER
Per result note:    Notes recorded by Carey Donahue CMA on 5/3/2019 at 1:56 PM CDT  Carey Donahue contacted [facility] on 05/03/19 and left a message. If patient calls back please make sure patient is taking Thyroid Medication daily per Dr. Boone.    ------    Notes recorded by Braxton Boone MD on 5/1/2019 at 4:43 PM CDT  PLEASE CONTACT PT AND MAKE SURE SHE IS TAKING HER THYROID MED DAILY    Patient returned call and she is takiing the levothyroxine daily.    Please advise.

## 2019-06-20 DIAGNOSIS — E06.3 AUTOIMMUNE HYPOTHYROIDISM: ICD-10-CM

## 2019-06-20 DIAGNOSIS — E11.29 TYPE 2 DIABETES MELLITUS WITH MICROALBUMINURIA, WITHOUT LONG-TERM CURRENT USE OF INSULIN (H): ICD-10-CM

## 2019-06-20 DIAGNOSIS — R80.9 TYPE 2 DIABETES MELLITUS WITH MICROALBUMINURIA, WITHOUT LONG-TERM CURRENT USE OF INSULIN (H): ICD-10-CM

## 2019-06-20 DIAGNOSIS — N18.30 CKD (CHRONIC KIDNEY DISEASE) STAGE 3, GFR 30-59 ML/MIN (H): ICD-10-CM

## 2019-06-20 LAB
ANION GAP SERPL CALCULATED.3IONS-SCNC: 10 MMOL/L (ref 3–14)
BUN SERPL-MCNC: 30 MG/DL (ref 7–30)
CALCIUM SERPL-MCNC: 10.8 MG/DL (ref 8.5–10.1)
CHLORIDE SERPL-SCNC: 108 MMOL/L (ref 94–109)
CHOLEST SERPL-MCNC: 160 MG/DL
CO2 SERPL-SCNC: 26 MMOL/L (ref 20–32)
CREAT SERPL-MCNC: 1.21 MG/DL (ref 0.52–1.04)
GFR SERPL CREATININE-BSD FRML MDRD: 44 ML/MIN/{1.73_M2}
GLUCOSE SERPL-MCNC: 134 MG/DL (ref 70–99)
HBA1C MFR BLD: 8 % (ref 0–5.6)
HDLC SERPL-MCNC: 54 MG/DL
LDLC SERPL CALC-MCNC: 65 MG/DL
NONHDLC SERPL-MCNC: 106 MG/DL
POTASSIUM SERPL-SCNC: 4.4 MMOL/L (ref 3.4–5.3)
SODIUM SERPL-SCNC: 144 MMOL/L (ref 133–144)
T4 FREE SERPL-MCNC: 1.16 NG/DL (ref 0.76–1.46)
TRIGL SERPL-MCNC: 203 MG/DL
TSH SERPL DL<=0.005 MIU/L-ACNC: 8.39 MU/L (ref 0.4–4)
TSH SERPL DL<=0.005 MIU/L-ACNC: 8.39 MU/L (ref 0.4–4)

## 2019-06-20 PROCEDURE — 80061 LIPID PANEL: CPT | Performed by: INTERNAL MEDICINE

## 2019-06-20 PROCEDURE — 80048 BASIC METABOLIC PNL TOTAL CA: CPT | Performed by: INTERNAL MEDICINE

## 2019-06-20 PROCEDURE — 36415 COLL VENOUS BLD VENIPUNCTURE: CPT | Performed by: INTERNAL MEDICINE

## 2019-06-20 PROCEDURE — 84443 ASSAY THYROID STIM HORMONE: CPT | Performed by: INTERNAL MEDICINE

## 2019-06-20 PROCEDURE — 84439 ASSAY OF FREE THYROXINE: CPT | Performed by: INTERNAL MEDICINE

## 2019-06-20 PROCEDURE — 83036 HEMOGLOBIN GLYCOSYLATED A1C: CPT | Performed by: INTERNAL MEDICINE

## 2019-06-24 ENCOUNTER — OFFICE VISIT (OUTPATIENT)
Dept: INTERNAL MEDICINE | Facility: CLINIC | Age: 74
End: 2019-06-24
Payer: COMMERCIAL

## 2019-06-24 VITALS
OXYGEN SATURATION: 97 % | TEMPERATURE: 97.8 F | BODY MASS INDEX: 22.07 KG/M2 | RESPIRATION RATE: 14 BRPM | WEIGHT: 116.8 LBS | HEART RATE: 73 BPM | DIASTOLIC BLOOD PRESSURE: 56 MMHG | SYSTOLIC BLOOD PRESSURE: 120 MMHG

## 2019-06-24 DIAGNOSIS — M17.0 PRIMARY OSTEOARTHRITIS OF BOTH KNEES: ICD-10-CM

## 2019-06-24 DIAGNOSIS — E06.3 AUTOIMMUNE HYPOTHYROIDISM: ICD-10-CM

## 2019-06-24 DIAGNOSIS — R80.9 TYPE 2 DIABETES MELLITUS WITH MICROALBUMINURIA, WITHOUT LONG-TERM CURRENT USE OF INSULIN (H): Primary | ICD-10-CM

## 2019-06-24 DIAGNOSIS — E11.29 TYPE 2 DIABETES MELLITUS WITH MICROALBUMINURIA, WITHOUT LONG-TERM CURRENT USE OF INSULIN (H): Primary | ICD-10-CM

## 2019-06-24 PROCEDURE — 99214 OFFICE O/P EST MOD 30 MIN: CPT | Performed by: INTERNAL MEDICINE

## 2019-06-24 RX ORDER — LEVOTHYROXINE SODIUM 100 UG/1
100 TABLET ORAL DAILY
Qty: 90 TABLET | Refills: 0 | Status: SHIPPED | OUTPATIENT
Start: 2019-06-24 | End: 2019-09-11

## 2019-06-24 RX ORDER — GLIMEPIRIDE 2 MG/1
2 TABLET ORAL
Qty: 90 TABLET | Refills: 1 | Status: SHIPPED | OUTPATIENT
Start: 2019-06-24 | End: 2019-06-30

## 2019-06-24 NOTE — PROGRESS NOTES
Subjective     Gregoria Gan is a 74 year old female who presents to clinic today for the following health issues:    HPI     Diabetes Follow-up      How often are you checking your blood sugar? One time daily    What time of day are you checking your blood sugars (select all that apply)?  Before meals AM and PM    Have you had any blood sugars above 200?  Yes- afternoon # regularly > 200.     Have you had any blood sugars below 70?  No    What symptoms do you notice when your blood sugar is low?  None    What concerns do you have today about your diabetes? None     Do you have any of these symptoms? (Select all that apply)  No numbness or tingling in feet.  No redness, sores or blisters on feet.  No complaints of excessive thirst.  No reports of blurry vision.  No significant changes to weight.     Have you had a diabetic eye exam in the last 12 months? Yes- Date of last eye exam: 918/18    BP Readings from Last 2 Encounters:   06/24/19 120/56   04/29/19 112/52     Hemoglobin A1C (%)   Date Value   06/20/2019 8.0 (H)   02/28/2019 8.6 (H)     LDL Cholesterol Calculated (mg/dL)   Date Value   06/20/2019 65   02/28/2019 117 (H)       Diabetes Management Resources              Reviewed and updated as needed this visit by Provider         Review of Systems   ROS COMP: Constitutional, HEENT, cardiovascular, pulmonary, gi and gu systems are negative, except as otherwise noted.      Objective    /56   Pulse 73   Temp 97.8  F (36.6  C) (Temporal)   Resp 14   Wt 53 kg (116 lb 12.8 oz)   SpO2 97%   Breastfeeding? No   BMI 22.07 kg/m    Body mass index is 22.07 kg/m .  Physical Exam   GENERAL APPEARANCE: alert and no distress  HENT: nose and mouth without ulcers or lesions  NECK: no adenopathy, no asymmetry, masses, or scars and thyroid normal to palpation  RESP: lungs clear to auscultation - no rales, rhonchi or wheezes  CV: regular rates and rhythm, normal S1 S2, no S3 or S4 and no murmur, click or  rub    Diagnostic Test Results:  Results for orders placed or performed in visit on 06/20/19   Basic metabolic panel   Result Value Ref Range    Sodium 144 133 - 144 mmol/L    Potassium 4.4 3.4 - 5.3 mmol/L    Chloride 108 94 - 109 mmol/L    Carbon Dioxide 26 20 - 32 mmol/L    Anion Gap 10 3 - 14 mmol/L    Glucose 134 (H) 70 - 99 mg/dL    Urea Nitrogen 30 7 - 30 mg/dL    Creatinine 1.21 (H) 0.52 - 1.04 mg/dL    GFR Estimate 44 (L) >60 mL/min/[1.73_m2]    GFR Estimate If Black 51 (L) >60 mL/min/[1.73_m2]    Calcium 10.8 (H) 8.5 - 10.1 mg/dL   TSH   Result Value Ref Range    TSH 8.39 (H) 0.40 - 4.00 mU/L   Lipid panel reflex to direct LDL Fasting   Result Value Ref Range    Cholesterol 160 <200 mg/dL    Triglycerides 203 (H) <150 mg/dL    HDL Cholesterol 54 >49 mg/dL    LDL Cholesterol Calculated 65 <100 mg/dL    Non HDL Cholesterol 106 <130 mg/dL   Hemoglobin A1c   Result Value Ref Range    Hemoglobin A1C 8.0 (H) 0 - 5.6 %   TSH with free T4 reflex   Result Value Ref Range    TSH 8.39 (H) 0.40 - 4.00 mU/L   T4 free   Result Value Ref Range    T4 Free 1.16 0.76 - 1.46 ng/dL           Assessment & Plan     1. Type 2 diabetes mellitus with microalbuminuria, without long-term current use of insulin (H)  Increase glimepiride to 2 mg   Afternoon and evening glucose # regularly > 200  - glimepiride (AMARYL) 2 MG tablet; Take 1 tablet (2 mg) by mouth every morning (before breakfast)  Dispense: 90 tablet; Refill: 1  - Hemoglobin A1c; Future    2. Autoimmune hypothyroidism  Increase to 100 mcg daily  - levothyroxine (SYNTHROID/LEVOTHROID) 100 MCG tablet; Take 1 tablet (100 mcg) by mouth daily  Dispense: 90 tablet; Refill: 0  - TSH with free T4 reflex; Future    3. Primary osteoarthritis of both knees  See recs- tylenol and topicals. Avoid nsaids          Return in about 3 months (around 9/24/2019) for Diabetes recheck with labs before.    Braxton Boone MD  [facility]

## 2019-06-24 NOTE — PATIENT INSTRUCTIONS
For Osteoarthritis:  1. Try taking 1000 mg (2 500 mg tablets) of tylenol/acetaminophen twice daily  If this doesn't help  Add to this  2. Topical aspercreme with lidocaine (available over the counter)

## 2019-06-30 ENCOUNTER — NURSE TRIAGE (OUTPATIENT)
Dept: NURSING | Facility: CLINIC | Age: 74
End: 2019-06-30

## 2019-06-30 ENCOUNTER — TELEPHONE (OUTPATIENT)
Dept: INTERNAL MEDICINE | Facility: CLINIC | Age: 74
End: 2019-06-30

## 2019-06-30 DIAGNOSIS — R80.9 TYPE 2 DIABETES MELLITUS WITH MICROALBUMINURIA, WITHOUT LONG-TERM CURRENT USE OF INSULIN (H): ICD-10-CM

## 2019-06-30 DIAGNOSIS — E11.29 TYPE 2 DIABETES MELLITUS WITH MICROALBUMINURIA, WITHOUT LONG-TERM CURRENT USE OF INSULIN (H): ICD-10-CM

## 2019-06-30 RX ORDER — GLIMEPIRIDE 2 MG/1
4 TABLET ORAL
Qty: 90 TABLET | Refills: 0 | Status: SHIPPED | OUTPATIENT
Start: 2019-06-30 | End: 2019-11-17

## 2019-06-30 NOTE — TELEPHONE ENCOUNTER
Clinic Action Needed:FYI (Dosage change on Amaryl)  Reason for Call:  Spouse calling stating patient's  blood sugar is 400 in past 2 minutes. Patient last ate at 130 pm today and has had only water since then. Denies other symptoms. Afebrile. Denies any difficulty breathing or confusion. Patient had Amaryl dose increased to 2 mg on 6/24/19 office visit. Spouse reporting patient took Amaryl at 9 a.m. This morning. Reporting fasting blood sugars have been ranging 140's.     Paged on call Dr Shearer through [employer] at 605 pm. To call Rosalba at . Advised patient if no return call with in 20 minutes to call back. Patient contact number .     Dr Shearer returned page advised to have patient increase Amaryl dosing to 4 mg's daily and update Dr Boone on blood sugar readings in 2 weeks.     Spouse notified and verbalized understanding. Reviewed to call back with any change or increase in symptoms.     Rosalba Butler RN  [facility]      Reason for Disposition    Blood glucose > 400 mg/dl (22 mmol/l)    Additional Information    Negative: Unconscious or difficult to awaken    Negative: Acting confused (e.g., disoriented, slurred speech)    Negative: Very weak (e.g., can't stand)    Negative: Sounds like a life-threatening emergency to the triager    Negative: [1] Vomiting AND [2] signs of dehydration (e.g., very dry mouth, lightheaded, etc.)    Negative: [1] Blood glucose > 240 mg/dl (13 mmol/l) AND [2] rapid breathing    Negative: Blood glucose > 500 mg/dl (27.5 mmol/l)    Negative: [1] Blood glucose > 240 mg/dl (13 mmol/l) AND [2] urine ketones moderate-large (or more than 1+)    Negative: [1] Blood glucose > 240 mg/dl (13 mmol/l) AND [2] blood ketones > 1.5 mmol/l    Negative: [1] Blood glucose > 240 mg/dl (13 mmol/l) AND [2] vomiting AND [3] unable to check for ketones (in blood or urine)    Negative: [1] New onset Diabetes suspected (e.g., frequent urination, weak, weight  loss) AND [2] vomiting or rapid breathing    Negative: Vomiting lasts > 4 hours    Negative: Patient sounds very sick or weak to the triager    Negative: Fever > 100.5 F (38.1 C)    Protocols used: DIABETES - HIGH BLOOD SUGAR-A-AH

## 2019-06-30 NOTE — TELEPHONE ENCOUNTER
Clinic Action Needed:FYI (Dosage change on Amaryl)  Reason for Call:  Spouse calling stating patient's  blood sugar is 400 in past 2 minutes. Patient last ate at 130 pm today and has had only water since then. Denies other symptoms. Afebrile. Denies any difficulty breathing or confusion. Patient had Amaryl dose increased to 2 mg on 6/24/19 office visit. Spouse reporting patient took Amaryl at 9 a.m. This morning. Reporting fasting blood sugars have been ranging 140's.     Paged on call Dr Shearer through [employer] at 605 pm. To call Rosalba at . Advised patient if no return call with in 20 minutes to call back. Patient contact number .     Dr Shearer returned page advised to have patient increase Amaryl dosing to 4 mg's daily and update Dr Boone on blood sugar readings in 2 weeks.     Spouse notified and verbalized understanding. Reviewed to call back with any change or increase in symptoms.     Rosalba Butler RN  [facility]

## 2019-07-25 DIAGNOSIS — I10 HTN (HYPERTENSION), BENIGN: ICD-10-CM

## 2019-07-25 RX ORDER — AMLODIPINE BESYLATE 10 MG/1
10 TABLET ORAL DAILY
Qty: 90 TABLET | Refills: 0 | Status: SHIPPED | OUTPATIENT
Start: 2019-07-25 | End: 2019-10-22

## 2019-07-25 NOTE — TELEPHONE ENCOUNTER
"Requested Prescriptions   Pending Prescriptions Disp Refills     amLODIPine (NORVASC) 10 MG tablet [Pharmacy Med Name: amLODIPine Besylate Oral Tablet 10 MG]  Last Written Prescription Date:  03/04/2019  Last Fill Quantity: 90,  # refills: 0   Last Office Visit: 6/24/2019   Future Office Visit:    Next 5 appointments (look out 90 days)    Sep 23, 2019 11:40 AM CDT  Office Visit with Braxton Boone MD  [facility] ([facility] [address] [address] [phone]          90 tablet 0     Sig: Take 1 tablet (10 mg) by mouth daily       Calcium Channel Blockers Protocol  Failed - 7/25/2019  2:36 PM        Failed - Normal serum creatinine on file in past 12 months     Recent Labs   Lab Test 06/20/19  0902   CR 1.21*             Passed - Blood pressure under 140/90 in past 12 months     BP Readings from Last 3 Encounters:   06/24/19 120/56   04/29/19 112/52   03/04/19 116/54                 Passed - Recent (12 mo) or future (30 days) visit within the authorizing provider's specialty     Patient had office visit in the last 12 months or has a visit in the next 30 days with authorizing provider or within the authorizing provider's specialty.  See \"Patient Info\" tab in inbasket, or \"Choose Columns\" in Meds & Orders section of the refill encounter.              Passed - Medication is active on med list        Passed - Patient is age 18 or older        Passed - No active pregnancy on record        Passed - No positive pregnancy test in past 12 months          "

## 2019-08-20 DIAGNOSIS — R80.9 TYPE 2 DIABETES MELLITUS WITH MICROALBUMINURIA, WITHOUT LONG-TERM CURRENT USE OF INSULIN (H): ICD-10-CM

## 2019-08-20 DIAGNOSIS — E11.29 TYPE 2 DIABETES MELLITUS WITH MICROALBUMINURIA, WITHOUT LONG-TERM CURRENT USE OF INSULIN (H): ICD-10-CM

## 2019-08-20 DIAGNOSIS — E78.5 HYPERLIPIDEMIA LDL GOAL <100: ICD-10-CM

## 2019-08-21 RX ORDER — ATORVASTATIN CALCIUM 20 MG/1
20 TABLET, FILM COATED ORAL DAILY
Qty: 90 TABLET | Refills: 2 | Status: SHIPPED | OUTPATIENT
Start: 2019-08-21 | End: 2020-05-11

## 2019-08-21 RX ORDER — PIOGLITAZONEHYDROCHLORIDE 30 MG/1
30 TABLET ORAL DAILY
Qty: 90 TABLET | Refills: 1 | Status: SHIPPED | OUTPATIENT
Start: 2019-08-21 | End: 2020-01-21

## 2019-08-21 NOTE — TELEPHONE ENCOUNTER
Piolglitazone    Routing refill request to provider for review/approval because:  Labs not current:  AST/ALT  Labs out of range: González HAUSER RN, BSN, PHN

## 2019-08-21 NOTE — TELEPHONE ENCOUNTER
Atorvastatin    Prescription approved per [facility].    Alina HAUSER RN, BSN, PHN

## 2019-08-28 ENCOUNTER — NURSE TRIAGE (OUTPATIENT)
Dept: INTERNAL MEDICINE | Facility: CLINIC | Age: 74
End: 2019-08-28

## 2019-08-28 NOTE — TELEPHONE ENCOUNTER
Patient's  calling. Patient has been having some symptoms for 2 months. Feeling very sleepy. Sleeps almost more than 10 hours a day. Feeling very weak. And having confusion. Symptoms are constant. Had the weakness and confusion before but has gotten more serious. Fatigue is new. Weakness is generalized, no one- sided weakness. Blood sugars around 139, 160, 180 before breakfast. Blood sugars before dinner varies - reported numbers of 157, 222, 262, 192, 174. Confusion seems to be shown as patient repeating questions and gets confused about what day it is. Does not remember to take medicine twice a day and gets confused whether or not she took her medications or not and  has to check. Can only play with grandchildren for 15 minutes and then gets tired. Can only shop for a little bit and then has to go home because gets too tired or weak.    Normally would advise ER and did discuss this with pt's  but he would like PCPs opinion as symptoms have been going on for 2 months. Please advise.

## 2019-08-28 NOTE — TELEPHONE ENCOUNTER
Seems more like subacute cognitive changes- not altered level of consciousness/delirium etc  I would schedule something this week- ok to use a same day appt of mine thurs/fri.

## 2019-08-28 NOTE — TELEPHONE ENCOUNTER
Patient's  informed. Appt scheduled for tomorrow. Advised to call if any change in symptoms before then.

## 2019-08-29 ENCOUNTER — OFFICE VISIT (OUTPATIENT)
Dept: INTERNAL MEDICINE | Facility: CLINIC | Age: 74
End: 2019-08-29
Payer: COMMERCIAL

## 2019-08-29 VITALS
BODY MASS INDEX: 21.86 KG/M2 | HEART RATE: 73 BPM | SYSTOLIC BLOOD PRESSURE: 120 MMHG | WEIGHT: 115.7 LBS | OXYGEN SATURATION: 95 % | TEMPERATURE: 98.2 F | DIASTOLIC BLOOD PRESSURE: 60 MMHG

## 2019-08-29 DIAGNOSIS — R41.3 MEMORY LOSS: Primary | ICD-10-CM

## 2019-08-29 DIAGNOSIS — G47.00 INSOMNIA, UNSPECIFIED TYPE: ICD-10-CM

## 2019-08-29 DIAGNOSIS — E06.3 AUTOIMMUNE HYPOTHYROIDISM: ICD-10-CM

## 2019-08-29 DIAGNOSIS — G47.19 EXCESSIVE DAYTIME SLEEPINESS: ICD-10-CM

## 2019-08-29 LAB
ALBUMIN SERPL-MCNC: 3.8 G/DL (ref 3.4–5)
ALP SERPL-CCNC: 68 U/L (ref 40–150)
ALT SERPL W P-5'-P-CCNC: 43 U/L (ref 0–50)
ANION GAP SERPL CALCULATED.3IONS-SCNC: 5 MMOL/L (ref 3–14)
AST SERPL W P-5'-P-CCNC: 25 U/L (ref 0–45)
BILIRUB SERPL-MCNC: 0.6 MG/DL (ref 0.2–1.3)
BUN SERPL-MCNC: 23 MG/DL (ref 7–30)
CALCIUM SERPL-MCNC: 11.4 MG/DL (ref 8.5–10.1)
CHLORIDE SERPL-SCNC: 107 MMOL/L (ref 94–109)
CO2 SERPL-SCNC: 31 MMOL/L (ref 20–32)
CREAT SERPL-MCNC: 1.02 MG/DL (ref 0.52–1.04)
ERYTHROCYTE [DISTWIDTH] IN BLOOD BY AUTOMATED COUNT: 13.2 % (ref 10–15)
GFR SERPL CREATININE-BSD FRML MDRD: 54 ML/MIN/{1.73_M2}
GLUCOSE SERPL-MCNC: 138 MG/DL (ref 70–99)
HCT VFR BLD AUTO: 41.9 % (ref 35–47)
HGB BLD-MCNC: 14.4 G/DL (ref 11.7–15.7)
MCH RBC QN AUTO: 31 PG (ref 26.5–33)
MCHC RBC AUTO-ENTMCNC: 34.4 G/DL (ref 31.5–36.5)
MCV RBC AUTO: 90 FL (ref 78–100)
PLATELET # BLD AUTO: 247 10E9/L (ref 150–450)
POTASSIUM SERPL-SCNC: 3.7 MMOL/L (ref 3.4–5.3)
PROT SERPL-MCNC: 8.5 G/DL (ref 6.8–8.8)
RBC # BLD AUTO: 4.64 10E12/L (ref 3.8–5.2)
SODIUM SERPL-SCNC: 143 MMOL/L (ref 133–144)
T4 FREE SERPL-MCNC: 1.83 NG/DL (ref 0.76–1.46)
TSH SERPL DL<=0.005 MIU/L-ACNC: 0.18 MU/L (ref 0.4–4)
VIT B12 SERPL-MCNC: 363 PG/ML (ref 193–986)
WBC # BLD AUTO: 7.5 10E9/L (ref 4–11)

## 2019-08-29 PROCEDURE — 84443 ASSAY THYROID STIM HORMONE: CPT | Performed by: INTERNAL MEDICINE

## 2019-08-29 PROCEDURE — 82607 VITAMIN B-12: CPT | Performed by: INTERNAL MEDICINE

## 2019-08-29 PROCEDURE — 85027 COMPLETE CBC AUTOMATED: CPT | Performed by: INTERNAL MEDICINE

## 2019-08-29 PROCEDURE — 84439 ASSAY OF FREE THYROXINE: CPT | Performed by: INTERNAL MEDICINE

## 2019-08-29 PROCEDURE — 99214 OFFICE O/P EST MOD 30 MIN: CPT | Performed by: INTERNAL MEDICINE

## 2019-08-29 PROCEDURE — 36415 COLL VENOUS BLD VENIPUNCTURE: CPT | Performed by: INTERNAL MEDICINE

## 2019-08-29 PROCEDURE — 80053 COMPREHEN METABOLIC PANEL: CPT | Performed by: INTERNAL MEDICINE

## 2019-08-29 NOTE — PROGRESS NOTES
Subjective     Gregoria Gan is a 74 year old female who presents to clinic today for the following health issues:    Patient has been having some symptoms for 2 months. Feeling very sleepy. Sleeps almost more than 10 hours a day. Feeling very weak. And having confusion. Symptoms are constant. Had the weakness and confusion before but has gotten more serious. Fatigue is new. Weakness is generalized, no one- sided weakness. Blood sugars around 139, 160, 180 before breakfast. Blood sugars before dinner varies - reported numbers of 157, 222, 262, 192, 174. Confusion seems to be shown as patient repeating questions and gets confused about what day it is. Does not remember to take medicine twice a day and gets confused whether or not she took her medications or not and  has to check. Can only play with grandchildren for 15 minutes and then gets tired. Can only shop for a little bit and then has to go home because gets too tired or weak.    HPI   Diabetes Follow-up      How often are you checking your blood sugar? Two times daily    What time of day are you checking your blood sugars (select all that apply)?  Before meals    Have you had any blood sugars above 200?  Yes - 262    Have you had any blood sugars below 70?  No    What symptoms do you notice when your blood sugar is low?  None    What concerns do you have today about your diabetes? None     Do you have any of these symptoms? (Select all that apply)  No numbness or tingling in feet.  No redness, sores or blisters on feet.  No complaints of excessive thirst.  No reports of blurry vision.  No significant changes to weight.     Have you had a diabetic eye exam in the last 12 months? Yes- Date of last eye exam: 09/18/2018    BP Readings from Last 2 Encounters:   08/29/19 120/60   06/24/19 120/56     Hemoglobin A1C (%)   Date Value   06/20/2019 8.0 (H)   02/28/2019 8.6 (H)     LDL Cholesterol Calculated (mg/dL)   Date Value   06/20/2019 65   02/28/2019 117  (H)       Diabetes Management Resources      How many servings of fruits and vegetables do you eat daily?  2-3    On average, how many sweetened beverages do you drink each day (soda, juice, sweet tea, etc)?   0    How many days per week do you miss taking your medication? 0           Reviewed and updated as needed this visit by Provider       Review of Systems   ROS COMP: Constitutional, HEENT, cardiovascular, pulmonary, gi and gu systems are negative, except as otherwise noted.      Objective    /60   Pulse 73   Temp 98.2  F (36.8  C) (Oral)   Wt 52.5 kg (115 lb 11.2 oz)   SpO2 95%   BMI 21.86 kg/m    Body mass index is 21.86 kg/m .  Physical Exam   GENERAL APPEARANCE: alert and no distress  HENT: nose and mouth without ulcers or lesions and normal cephalic/atraumatic  NECK: no adenopathy, no asymmetry, masses, or scars and thyroid normal to palpation  RESP: lungs clear to auscultation - no rales, rhonchi or wheezes  CV: regular rates and rhythm, normal S1 S2, no S3 or S4 and no murmur, click or rub  SKIN: no suspicious lesions or rashes  NEURO: Normal strength and tone, mentation intact, speech normal, rapid alternating movements normal and normal strength throughout    Six Item Cognitive Impairment Test   (6CIT):      What year is it?                               Incorrect - 4 points    What month is it?                               Incorrect - 3 points      Give the patient an address to remember with five components:   [place] ( first and last name - 2 components)   [address]  (number and name of street - 2 components)   [place] ( city - 1 component)      About what time is it (within the hour)? Incorrect - 3 points    Count backwards from 20 to 1:   Correct - 0 points    Say the months of the year in reverse: More than one error - 4 points    Repeat the address phrase:   All wrong - 10 points    Total 6CIT Score:      24/28    Interpretation: The 6CIT uses an inverse score and  questions are weighted to produce a total out of 28. Scores of 0-7 are considered normal and 8 or more significant.    Advantages The test has high sensitivity without compromising specificity even in mild dementia. It is easy to translate linguistically and culturally.  Disadvantages The main disadvantage is in the scoring and weighting of the test, which is initially confusing, however computer models have simplified this greatly.    Probability Statistics: At the 7/8 cut off: Overall figures sensitivity 90% specificity 100%, in mild dementia sensitivity = 78% , specificity = 100%    Copyright 2000 [facility]. Courtesy of Dr. Spencer Salcedo    Results for orders placed or performed in visit on 08/29/19 (from the past 24 hour(s))   CBC with platelets   Result Value Ref Range    WBC 7.5 4.0 - 11.0 10e9/L    RBC Count 4.64 3.8 - 5.2 10e12/L    Hemoglobin 14.4 11.7 - 15.7 g/dL    Hematocrit 41.9 35.0 - 47.0 %    MCV 90 78 - 100 fl    MCH 31.0 26.5 - 33.0 pg    MCHC 34.4 31.5 - 36.5 g/dL    RDW 13.2 10.0 - 15.0 %    Platelet Count 247 150 - 450 10e9/L   Comprehensive metabolic panel (BMP + Alb, Alk Phos, ALT, AST, Total. Bili, TP)   Result Value Ref Range    Sodium 143 133 - 144 mmol/L    Potassium 3.7 3.4 - 5.3 mmol/L    Chloride 107 94 - 109 mmol/L    Carbon Dioxide 31 20 - 32 mmol/L    Anion Gap 5 3 - 14 mmol/L    Glucose 138 (H) 70 - 99 mg/dL    Urea Nitrogen 23 7 - 30 mg/dL    Creatinine 1.02 0.52 - 1.04 mg/dL    GFR Estimate 54 (L) >60 mL/min/[1.73_m2]    GFR Estimate If Black 62 >60 mL/min/[1.73_m2]    Calcium 11.4 (H) 8.5 - 10.1 mg/dL    Bilirubin Total 0.6 0.2 - 1.3 mg/dL    Albumin 3.8 3.4 - 5.0 g/dL    Protein Total 8.5 6.8 - 8.8 g/dL    Alkaline Phosphatase 68 40 - 150 U/L    ALT 43 0 - 50 U/L    AST 25 0 - 45 U/L           Assessment & Plan     1. Memory loss  Does appear to have a significant amount of cognitive impairment.  Testing is probably affected somewhat by language  but even with this the history provided by the family and testing today indicates concern.  Initial work-up to include the following  -Determining meds could be trazodone.  Recommend cutting back or stopping this if possible  - TSH with free T4 reflex  - Vitamin B12  - CBC with platelets  - MR Brain w/o Contrast; Future    2. Excessive daytime sleepiness  Is likely as a result of her Change in cognition  To recommend that patient consider discontinuing the trazodone  - Comprehensive metabolic panel (BMP + Alb, Alk Phos, ALT, AST, Total. Bili, TP)    3. Autoimmune hypothyroidism  4. Insomnia, unspecified type           See Patient Instructions    Return for Follow up visit after MRI completed.    Braxton Boone MD  [facility]

## 2019-08-30 ENCOUNTER — TELEPHONE (OUTPATIENT)
Dept: INTERNAL MEDICINE | Facility: CLINIC | Age: 74
End: 2019-08-30

## 2019-08-30 NOTE — TELEPHONE ENCOUNTER
Message handled by Nurse Triage with [employer] - provider name: Dr. Paolo Valverde for contrast with and/or without.     Clarified order resent : [phone]      Alina HAUSER RN, BSN, PHN

## 2019-08-30 NOTE — TELEPHONE ENCOUNTER
Reason for Call: clarification on order for MRI Brain    Order or referral being requested: Dr. Boone Brain MRI    Date needed: as soon as possible    Has the patient been seen by the PCP for this problem? YES    Additional comments: Delia from [facility] imaging scheduling needs clarification on order. Order stats w/o contrast and the protocol is for the order to be written with AND w/o contrast. If wants order to stay the same w/o contrast please state the reason why on the order and fax again to [phone]. Or change to with AND without contrast and fax again so the MRI can be scheduled.    Phone number Patient can be reached at:  Other phone number:  [phone]    Best Time:  anytime    Can we leave a detailed message on this number?  Not Applicable    Call taken on 8/30/2019 at 10:16 AM by Anuradha Corral

## 2019-09-05 ENCOUNTER — TRANSFERRED RECORDS (OUTPATIENT)
Dept: HEALTH INFORMATION MANAGEMENT | Facility: CLINIC | Age: 74
End: 2019-09-05

## 2019-09-11 RX ORDER — LEVOTHYROXINE SODIUM 100 UG/1
100 TABLET ORAL DAILY
Qty: 90 TABLET | Refills: 0 | Status: SHIPPED | OUTPATIENT
Start: 2019-09-11 | End: 2020-01-07

## 2019-09-19 DIAGNOSIS — R80.9 TYPE 2 DIABETES MELLITUS WITH MICROALBUMINURIA, WITHOUT LONG-TERM CURRENT USE OF INSULIN (H): ICD-10-CM

## 2019-09-19 DIAGNOSIS — E06.3 AUTOIMMUNE HYPOTHYROIDISM: ICD-10-CM

## 2019-09-19 DIAGNOSIS — E11.29 TYPE 2 DIABETES MELLITUS WITH MICROALBUMINURIA, WITHOUT LONG-TERM CURRENT USE OF INSULIN (H): ICD-10-CM

## 2019-09-19 LAB
HBA1C MFR BLD: 8.1 % (ref 0–5.6)
T4 FREE SERPL-MCNC: 1.83 NG/DL (ref 0.76–1.46)
TSH SERPL DL<=0.005 MIU/L-ACNC: 0.12 MU/L (ref 0.4–4)

## 2019-09-19 PROCEDURE — 36415 COLL VENOUS BLD VENIPUNCTURE: CPT | Performed by: INTERNAL MEDICINE

## 2019-09-19 PROCEDURE — 83036 HEMOGLOBIN GLYCOSYLATED A1C: CPT | Performed by: INTERNAL MEDICINE

## 2019-09-19 PROCEDURE — 84439 ASSAY OF FREE THYROXINE: CPT | Performed by: INTERNAL MEDICINE

## 2019-09-19 PROCEDURE — 84443 ASSAY THYROID STIM HORMONE: CPT | Performed by: INTERNAL MEDICINE

## 2019-09-23 ENCOUNTER — MEDICAL CORRESPONDENCE (OUTPATIENT)
Dept: HEALTH INFORMATION MANAGEMENT | Facility: CLINIC | Age: 74
End: 2019-09-23

## 2019-09-23 ENCOUNTER — OFFICE VISIT (OUTPATIENT)
Dept: INTERNAL MEDICINE | Facility: CLINIC | Age: 74
End: 2019-09-23
Payer: COMMERCIAL

## 2019-09-23 VITALS
HEART RATE: 78 BPM | SYSTOLIC BLOOD PRESSURE: 128 MMHG | DIASTOLIC BLOOD PRESSURE: 66 MMHG | BODY MASS INDEX: 21.71 KG/M2 | TEMPERATURE: 98.5 F | WEIGHT: 115 LBS | HEIGHT: 61 IN | OXYGEN SATURATION: 95 %

## 2019-09-23 DIAGNOSIS — R80.9 TYPE 2 DIABETES MELLITUS WITH MICROALBUMINURIA, WITHOUT LONG-TERM CURRENT USE OF INSULIN (H): Primary | ICD-10-CM

## 2019-09-23 DIAGNOSIS — R41.3 MEMORY LOSS: ICD-10-CM

## 2019-09-23 DIAGNOSIS — E11.29 TYPE 2 DIABETES MELLITUS WITH MICROALBUMINURIA, WITHOUT LONG-TERM CURRENT USE OF INSULIN (H): Primary | ICD-10-CM

## 2019-09-23 PROCEDURE — 90662 IIV NO PRSV INCREASED AG IM: CPT | Performed by: INTERNAL MEDICINE

## 2019-09-23 PROCEDURE — G0008 ADMIN INFLUENZA VIRUS VAC: HCPCS | Performed by: INTERNAL MEDICINE

## 2019-09-23 PROCEDURE — 99214 OFFICE O/P EST MOD 30 MIN: CPT | Mod: 25 | Performed by: INTERNAL MEDICINE

## 2019-09-23 ASSESSMENT — MIFFLIN-ST. JEOR: SCORE: 954.02

## 2019-09-23 NOTE — PROGRESS NOTES
Subjective     Gregoria Gna is a 74 year old female who presents to clinic today for the following health issues:    HPI   Diabetes Follow-up    How often are you checking your blood sugar? Two times daily    What time of day are you checking your blood sugars (select all that apply)?  Before meals    Have you had any blood sugars above 200?  Yes frequent    Have you had any blood sugars below 70?  No    Blood sugars are quite labile.  Most of the a.m. numbers are in the 120-160 range.  Before dinner numbers can range as low as 108 to as high as 260.  Over 50% of these are above 200 though there is also 10 to 20% or so below 150    What symptoms do you notice when your blood sugar is low?  None    What concerns do you have today about your diabetes? None     Do you have any of these symptoms? (Select all that apply)  No numbness or tingling in feet.  No redness, sores or blisters on feet.  No complaints of excessive thirst.  No reports of blurry vision.  No significant changes to weight.     Have you had a diabetic eye exam in the last 12 months?     04/2019 pt reports eye exam completed    BP Readings from Last 2 Encounters:   09/23/19 128/66   08/29/19 120/60     Hemoglobin A1C (%)   Date Value   09/19/2019 8.1 (H)   06/20/2019 8.0 (H)     LDL Cholesterol Calculated (mg/dL)   Date Value   06/20/2019 65   02/28/2019 117 (H)       Diabetes Management Resources      How many servings of fruits and vegetables do you eat daily?  2-3    On average, how many sweetened beverages do you drink each day (soda, juice, sweet tea, etc)?   0    How many days per week do you miss taking your medication? 0    Memory  Initial work-up negative for underlying cause.    Reviewed and updated as needed this visit by Provider         Review of Systems   ROS COMP: Constitutional, HEENT, cardiovascular, pulmonary, gi and gu systems are negative, except as otherwise noted.      Objective    /66   Pulse 78   Temp 98.5  F (36.9  " C) (Oral)   Ht 1.549 m (5' 1\")   Wt 52.2 kg (115 lb)   SpO2 95%   BMI 21.73 kg/m    Body mass index is 21.73 kg/m .  Physical Exam   GENERAL APPEARANCE: alert and no distress  RESP: lungs clear to auscultation - no rales, rhonchi or wheezes  CV: regular rates and rhythm, normal S1 S2, no S3 or S4 and no murmur, click or rub    Results for orders placed or performed in visit on 09/19/19   Hemoglobin A1c   Result Value Ref Range    Hemoglobin A1C 8.1 (H) 0 - 5.6 %                            0.76 - 1.46 ng/dL           Assessment & Plan     1. Type 2 diabetes mellitus with microalbuminuria, without long-term current use of insulin (H)  Get diagnostic CGM given the labile numbers.  My goal is an A1c less than 8% given health but I am afraid increasing her glimepiride further would potentially cause more hypoglycemia in the morning  - AMBULATORY ADULT DIABETES EDUCATOR REFERRAL    2. Memory loss  - MEMORY CLINIC REFERRAL            Return for Follow up visit after CGM.    Braxton Boone MD  [facility]        "

## 2019-09-24 ENCOUNTER — TELEPHONE (OUTPATIENT)
Dept: INTERNAL MEDICINE | Facility: CLINIC | Age: 74
End: 2019-09-24

## 2019-09-24 NOTE — TELEPHONE ENCOUNTER
Diabetes Education Scheduling Outreach #1:    Call to patient to schedule. Left message with phone number to call to schedule.    Plan for 2nd outreach attempt within 1 week.    Ashley Dawson  [facility]  Diabetes and Nutrition Scheduling

## 2019-09-29 DIAGNOSIS — G47.00 INSOMNIA, UNSPECIFIED TYPE: ICD-10-CM

## 2019-09-30 RX ORDER — TRAZODONE HYDROCHLORIDE 100 MG/1
TABLET ORAL
Qty: 90 TABLET | Refills: 3 | Status: SHIPPED | OUTPATIENT
Start: 2019-09-30 | End: 2020-10-12

## 2019-09-30 NOTE — TELEPHONE ENCOUNTER
"Requested Prescriptions   Pending Prescriptions Disp Refills     traZODone (DESYREL) 100 MG tablet [Pharmacy Med Name: traZODone HCl Oral Tablet 100 MG] 90 tablet 2     Sig: TAKE ONE TABLET BY MOUTH IN THE EVENING AS NEEDED FOR STOMACH   Last Written Prescription Date:  9/4/2018  Last Fill Quantity: 90,  # refills: 3   Last Office Visit: 9/23/2019   Future Office Visit:         Serotonin Modulators Passed - 9/29/2019  9:39 AM        Passed - Recent (12 mo) or future (30 days) visit within the authorizing provider's specialty     Patient has had an office visit with the authorizing provider or a provider within the authorizing providers department within the previous 12 mos or has a future within next 30 days. See \"Patient Info\" tab in inbasket, or \"Choose Columns\" in Meds & Orders section of the refill encounter.              Passed - Medication is active on med list        Passed - Patient is age 18 or older        Passed - No active pregnancy on record        Passed - No positive pregnancy test in past 12 months          "

## 2019-10-22 DIAGNOSIS — I10 HTN (HYPERTENSION), BENIGN: ICD-10-CM

## 2019-10-22 RX ORDER — LOSARTAN POTASSIUM 50 MG/1
TABLET ORAL
Qty: 90 TABLET | Refills: 2 | Status: SHIPPED | OUTPATIENT
Start: 2019-10-22 | End: 2020-07-14

## 2019-10-22 RX ORDER — AMLODIPINE BESYLATE 10 MG/1
10 TABLET ORAL DAILY
Qty: 90 TABLET | Refills: 2 | Status: SHIPPED | OUTPATIENT
Start: 2019-10-22 | End: 2020-07-14

## 2019-10-22 NOTE — TELEPHONE ENCOUNTER
"Requested Prescriptions   Pending Prescriptions Disp Refills     losartan (COZAAR) 50 MG tablet [Pharmacy Med Name: Losartan Potassium Oral Tablet 50 MG] 90 tablet 0     Sig: TAKE ONE TABLET BY MOUTH ONE TIME DAILY       Angiotensin-II Receptors Passed - 10/22/2019  9:27 AM        Passed - Last blood pressure under 140/90 in past 12 months     BP Readings from Last 3 Encounters:   09/23/19 128/66   08/29/19 120/60   06/24/19 120/56                 Passed - Recent (12 mo) or future (30 days) visit within the authorizing provider's specialty     Patient has had an office visit with the authorizing provider or a provider within the authorizing providers department within the previous 12 mos or has a future within next 30 days. See \"Patient Info\" tab in inbasket, or \"Choose Columns\" in Meds & Orders section of the refill encounter.              Passed - Medication is active on med list        Passed - Patient is age 18 or older        Passed - No active pregnancy on record        Passed - Normal serum creatinine on file in past 12 months     Recent Labs   Lab Test 08/29/19  1209   CR 1.02             Passed - Normal serum potassium on file in past 12 months     Recent Labs   Lab Test 08/29/19  1209   POTASSIUM 3.7                    Passed - No positive pregnancy test in past 12 months        amLODIPine (NORVASC) 10 MG tablet [Pharmacy Med Name: amLODIPine Besylate Oral Tablet 10 MG] 90 tablet 0     Sig: Take 1 tablet (10 mg) by mouth daily       Calcium Channel Blockers Protocol  Passed - 10/22/2019  9:27 AM        Passed - Blood pressure under 140/90 in past 12 months     BP Readings from Last 3 Encounters:   09/23/19 128/66   08/29/19 120/60   06/24/19 120/56                 Passed - Recent (12 mo) or future (30 days) visit within the authorizing provider's specialty     Patient has had an office visit with the authorizing provider or a provider within the authorizing providers department within the previous 12 mos " "or has a future within next 30 days. See \"Patient Info\" tab in inbasket, or \"Choose Columns\" in Meds & Orders section of the refill encounter.              Passed - Medication is active on med list        Passed - Patient is age 18 or older        Passed - No active pregnancy on record        Passed - Normal serum creatinine on file in past 12 months     Recent Labs   Lab Test 08/29/19  1209   CR 1.02             Passed - No positive pregnancy test in past 12 months          "

## 2019-11-17 DIAGNOSIS — R80.9 TYPE 2 DIABETES MELLITUS WITH MICROALBUMINURIA, WITHOUT LONG-TERM CURRENT USE OF INSULIN (H): ICD-10-CM

## 2019-11-17 DIAGNOSIS — E11.29 TYPE 2 DIABETES MELLITUS WITH MICROALBUMINURIA, WITHOUT LONG-TERM CURRENT USE OF INSULIN (H): ICD-10-CM

## 2019-11-17 NOTE — TELEPHONE ENCOUNTER
"Requested Prescriptions   Pending Prescriptions Disp Refills     glimepiride (AMARYL) 2 MG tablet [Pharmacy Med Name: Glimepiride Oral Tablet 2 MG] 90 tablet 0     Sig: Take 2 tablets (4 mg) by mouth every morning (before breakfast)   Last Written Prescription Date:  6/30/2019  Last Fill Quantity: 90,  # refills: 0   Last Office Visit: 9/23/2019   Future Office Visit:         Sulfonylurea Agents Passed - 11/17/2019  9:58 AM        Passed - Blood pressure less than 140/90 in past 6 months     BP Readings from Last 3 Encounters:   09/23/19 128/66   08/29/19 120/60   06/24/19 120/56                 Passed - Patient has documented LDL within the past 12 mos.     Recent Labs   Lab Test 06/20/19  0902   LDL 65             Passed - Patient has had a Microalbumin in the past 15 mos.     Recent Labs   Lab Test 02/28/19  0922   MICROL 125   UMALCR 88.03*             Passed - Patient has documented A1c within the specified period of time.     If HgbA1C is 8 or greater, it needs to be on file within the past 3 months.  If less than 8, must be on file within the past 6 months.     Recent Labs   Lab Test 09/19/19  0927   A1C 8.1*             Passed - Medication is active on med list        Passed - Patient is age 18 or older        Passed - No active pregnancy on record        Passed - Patient has a recent creatinine (normal) within the past 12 mos.     Recent Labs   Lab Test 08/29/19  1209   CR 1.02             Passed - Patient has not had a positive pregnancy test within the past 12 mos.        Passed - Recent (6 mo) or future (30 days) visit within the authorizing provider's specialty     Patient had office visit in the last 6 months or has a visit in the next 30 days with authorizing provider or within the authorizing provider's specialty.  See \"Patient Info\" tab in inbasket, or \"Choose Columns\" in Meds & Orders section of the refill encounter.              "

## 2019-11-19 RX ORDER — GLIMEPIRIDE 2 MG/1
4 TABLET ORAL
Qty: 90 TABLET | Refills: 0 | Status: SHIPPED | OUTPATIENT
Start: 2019-11-19 | End: 2020-01-21

## 2020-01-06 DIAGNOSIS — E06.3 AUTOIMMUNE HYPOTHYROIDISM: ICD-10-CM

## 2020-01-06 NOTE — TELEPHONE ENCOUNTER
"Requested Prescriptions   Pending Prescriptions Disp Refills     levothyroxine (SYNTHROID/LEVOTHROID) 100 MCG tablet [Pharmacy Med Name: Levothyroxine Sodium Oral Tablet 100 MCG] 12 tablet 0     Sig: Take 1 tablet (100 mcg) by mouth daily Except once weekly take 1/2 tablet       Thyroid Protocol Failed - 1/6/2020 10:21 AM        Failed - Normal TSH on file in past 12 months     Recent Labs   Lab Test 09/19/19  0927   TSH 0.12*              Passed - Patient is 12 years or older        Passed - Recent (12 mo) or future (30 days) visit within the authorizing provider's specialty     Patient has had an office visit with the authorizing provider or a provider within the authorizing providers department within the previous 12 mos or has a future within next 30 days. See \"Patient Info\" tab in inbasket, or \"Choose Columns\" in Meds & Orders section of the refill encounter.              Passed - Medication is active on med list        Passed - No active pregnancy on record     If patient is pregnant or has had a positive pregnancy test, please check TSH.          Passed - No positive pregnancy test in past 12 months     If patient is pregnant or has had a positive pregnancy test, please check TSH.          Routing refill request to provider for review/approval because:  Labs out of range:  tsh        "

## 2020-01-07 RX ORDER — LEVOTHYROXINE SODIUM 100 UG/1
100 TABLET ORAL DAILY
Qty: 30 TABLET | Refills: 0 | Status: SHIPPED | OUTPATIENT
Start: 2020-01-07 | End: 2020-01-21

## 2020-01-21 ENCOUNTER — OFFICE VISIT (OUTPATIENT)
Dept: INTERNAL MEDICINE | Facility: CLINIC | Age: 75
End: 2020-01-21
Payer: COMMERCIAL

## 2020-01-21 VITALS
WEIGHT: 116.7 LBS | DIASTOLIC BLOOD PRESSURE: 64 MMHG | HEIGHT: 61 IN | HEART RATE: 71 BPM | TEMPERATURE: 97.2 F | OXYGEN SATURATION: 97 % | SYSTOLIC BLOOD PRESSURE: 124 MMHG | BODY MASS INDEX: 22.03 KG/M2

## 2020-01-21 DIAGNOSIS — R80.9 TYPE 2 DIABETES MELLITUS WITH MICROALBUMINURIA, WITHOUT LONG-TERM CURRENT USE OF INSULIN (H): ICD-10-CM

## 2020-01-21 DIAGNOSIS — E11.29 TYPE 2 DIABETES MELLITUS WITH MICROALBUMINURIA, WITHOUT LONG-TERM CURRENT USE OF INSULIN (H): ICD-10-CM

## 2020-01-21 DIAGNOSIS — N18.30 BENIGN HYPERTENSION WITH CKD (CHRONIC KIDNEY DISEASE) STAGE III (H): ICD-10-CM

## 2020-01-21 DIAGNOSIS — I12.9 BENIGN HYPERTENSION WITH CKD (CHRONIC KIDNEY DISEASE) STAGE III (H): ICD-10-CM

## 2020-01-21 DIAGNOSIS — E78.5 HYPERLIPIDEMIA LDL GOAL <100: ICD-10-CM

## 2020-01-21 DIAGNOSIS — E06.3 AUTOIMMUNE HYPOTHYROIDISM: ICD-10-CM

## 2020-01-21 DIAGNOSIS — Z00.00 MEDICARE ANNUAL WELLNESS VISIT, INITIAL: Primary | ICD-10-CM

## 2020-01-21 LAB
CREAT UR-MCNC: 64 MG/DL
HBA1C MFR BLD: 9 % (ref 0–5.6)
MICROALBUMIN UR-MCNC: 16 MG/L
MICROALBUMIN/CREAT UR: 25.43 MG/G CR (ref 0–25)

## 2020-01-21 PROCEDURE — 82043 UR ALBUMIN QUANTITATIVE: CPT | Performed by: INTERNAL MEDICINE

## 2020-01-21 PROCEDURE — G0438 PPPS, INITIAL VISIT: HCPCS | Performed by: INTERNAL MEDICINE

## 2020-01-21 PROCEDURE — 99214 OFFICE O/P EST MOD 30 MIN: CPT | Mod: 25 | Performed by: INTERNAL MEDICINE

## 2020-01-21 PROCEDURE — 36415 COLL VENOUS BLD VENIPUNCTURE: CPT | Performed by: INTERNAL MEDICINE

## 2020-01-21 PROCEDURE — 83036 HEMOGLOBIN GLYCOSYLATED A1C: CPT | Performed by: INTERNAL MEDICINE

## 2020-01-21 RX ORDER — GLIMEPIRIDE 2 MG/1
6 TABLET ORAL
Qty: 270 TABLET | Refills: 1 | Status: SHIPPED | OUTPATIENT
Start: 2020-01-21 | End: 2020-05-29

## 2020-01-21 RX ORDER — PIOGLITAZONEHYDROCHLORIDE 30 MG/1
30 TABLET ORAL DAILY
Qty: 90 TABLET | Refills: 0 | Status: SHIPPED | OUTPATIENT
Start: 2020-01-21 | End: 2020-05-11

## 2020-01-21 RX ORDER — LEVOTHYROXINE SODIUM 100 UG/1
100 TABLET ORAL DAILY
Qty: 90 TABLET | Refills: 0 | Status: SHIPPED | OUTPATIENT
Start: 2020-01-21 | End: 2020-05-26

## 2020-01-21 RX ORDER — ASPIRIN 81 MG/1
81 TABLET, CHEWABLE ORAL DAILY
COMMUNITY
End: 2020-01-21

## 2020-01-21 ASSESSMENT — ACTIVITIES OF DAILY LIVING (ADL)
CURRENT_FUNCTION: HOUSEWORK REQUIRES ASSISTANCE
CURRENT_FUNCTION: PREPARING MEALS REQUIRES ASSISTANCE
CURRENT_FUNCTION: SHOPPING REQUIRES ASSISTANCE
CURRENT_FUNCTION: TRANSPORTATION REQUIRES ASSISTANCE
CURRENT_FUNCTION: MEDICATION ADMINISTRATION REQUIRES ASSISTANCE
CURRENT_FUNCTION: LAUNDRY REQUIRES ASSISTANCE

## 2020-01-21 ASSESSMENT — MIFFLIN-ST. JEOR: SCORE: 961.73

## 2020-01-21 NOTE — NURSING NOTE
"Chief Complaint   Patient presents with     Medicare Visit     /64   Pulse 71   Temp 97.2  F (36.2  C) (Temporal)   Ht 1.549 m (5' 1\")   Wt 52.9 kg (116 lb 11.2 oz)   SpO2 97%   BMI 22.05 kg/m   Estimated body mass index is 22.05 kg/m  as calculated from the following:    Height as of this encounter: 1.549 m (5' 1\").    Weight as of this encounter: 52.9 kg (116 lb 11.2 oz).        Health Maintenance due pending provider review:  NONE    n/a    Marisela Phillips CMA  "

## 2020-01-21 NOTE — PATIENT INSTRUCTIONS
Patient Education   Personalized Prevention Plan  You are due for the preventive services outlined below.  Your care team is available to assist you in scheduling these services.  If you have already completed any of these items, please share that information with your care team to update in your medical record.  Health Maintenance Due   Topic Date Due     Annual Wellness Visit  02/12/2016     Zoster (Shingles) Vaccine (2 of 3) 12/14/2017     Diabetic Foot Exam  12/20/2019       Understanding [employer] MyPlate  The USDA (U.S. Department of Agriculture) has guidelines to help you make healthy food choices. These are called MyPlate. MyPlate shows the food groups that make up healthy meals using the image of a place setting. Before you eat, think about the healthiest choices for what to put onto your plate or into your cup or bowl. To learn more about building a healthy plate, visit www.choosemyplate.gov.    The food groups    Fruits. Any fruit or 100% fruit juice counts as part of the Fruit Group. Fruits may be fresh, canned, frozen, or dried, and may be whole, cut-up, or pureed. Make half your plate fruits and vegetables.    Vegetables. Any vegetable or 100% vegetable juice counts as a member of the Vegetable Group. Vegetables may be fresh, frozen, canned, or dried. They can be served raw or cooked and may be whole, cut-up, or mashed. Make half your plate fruits and vegetables.    Grains. All foods made from grains are part of the Grains Group. These include wheat, rice, oats, cornmeal, and barley such as bread, pasta, oatmeal, cereal, tortillas, and grits. Grains should be no more than a quarter of your plate. At least half of your grains should be whole grains.    Protein. This group includes meat, poultry, seafood, beans and peas, eggs, processed soy products (like tofu), nuts (including nut butters), and seeds. Make protein choices no more than a quarter of your plate. Meat and poultry choices should be lean or low  fat.    Dairy. All fluid milk products and foods made from milk that contain calcium, like yogurt and cheese, are part of the Dairy Group. (Foods that have little calcium, such as cream, butter, and cream cheese, are not part of the group.) Most dairy choices should be low-fat or fat-free.    Oils. These are fats that are liquid at room temperature. They include canola, corn, olive, soybean, and sunflower oil. Foods that are mainly oil include mayonnaise, certain salad dressings, and soft margarines. You should have only 5 to 7 teaspoons of oils a day. You probably already get this much from the food you eat.  Date Last Reviewed: 8/1/2017 2000-2019 [employer]. [address] [place] [address], [place] [address]. All rights reserved. This information is not intended as a substitute for professional medical care. Always follow your healthcare professional's instructions.        Activities of Daily Living    Your Health Risk Assessment indicates you have difficulties with activities of daily living such as housework, bathing, preparing meals, taking medication, etc. Please make a follow up appointment for us to address this issue in more detail.

## 2020-01-21 NOTE — PROGRESS NOTES
"SUBJECTIVE:   Gregoria Gan is a 74 year old female who presents for Preventive Visit.  Are you in the first 12 months of your Medicare coverage?  No    Healthy Habits:     In general, how would you rate your overall health?  Good    Frequency of exercise:  2-3 days/week    Duration of exercise:  Less than 15 minutes    Do you usually eat at least 4 servings of fruit and vegetables a day, include whole grains    & fiber and avoid regularly eating high fat or \"junk\" foods?  No    Taking medications regularly:  Yes    Barriers to taking medications:  None    Medication side effects:  None    Ability to successfully perform activities of daily living:  Medication administration requires assistance, shopping requires assistance, housework requires assistance, laundry requires assistance, transportation requires assistance and preparing meals requires assistance    Home Safety:  No safety concerns identified    Hearing Impairment:  No hearing concerns    In the past 6 months, have you been bothered by leaking of urine?  No    In general, how would you rate your overall mental or emotional health?  Good      PHQ-2 Total Score: 0    Additional concerns today:  No    Do you feel safe in your environment? YES    Have you ever done Advance Care Planning? (For example, a Health Directive, POLST, or a discussion with a medical provider or your loved ones about your wishes): No, advance care planning information given to patient to review.  Patient plans to discuss their wishes with loved ones or provider.        Fall risk  Fallen 2 or more times in the past year?: No  Any fall with injury in the past year?: No    Cognitive Screening   1) Repeat 3 items (Leader, Season, Table)    2) Clock draw: NORMAL  3) 3 item recall: Recalls 1 object   Results: NORMAL clock, 1-2 items recalled: COGNITIVE IMPAIRMENT LESS LIKELY    Mini-CogTM Copyright [employer]. Licensed by the author for use in [facility]; reprinted with " permission (delaney@[facility]). All rights reserved.      Do you have sleep apnea, excessive snoring or daytime drowsiness?: no    Reviewed and updated as needed this visit by clinical staff  Tobacco  Allergies  Meds         Reviewed and updated as needed this visit by Provider        Social History     Tobacco Use     Smoking status: Never Smoker     Smokeless tobacco: Never Used   Substance Use Topics     Alcohol use: No     Alcohol/week: 0.0 standard drinks     If you drink alcohol do you typically have >3 drinks per day or >7 drinks per week? No    No flowsheet data found.        Diabetes Follow-up    How often are you checking your blood sugar? One time daily  What time of day are you checking your blood sugars (select all that apply)?  Before meals 160-260  Have you had any blood sugars above 200?  Yes    Have you had any blood sugars below 70?  No    What symptoms do you notice when your blood sugar is low?  None    What concerns do you have today about your diabetes? None     Do you have any of these symptoms? (Select all that apply)  No numbness or tingling in feet.  No redness, sores or blisters on feet.  No complaints of excessive thirst.  No reports of blurry vision.  No significant changes to weight.      BP Readings from Last 2 Encounters:   01/21/20 124/64   09/23/19 128/66     Hemoglobin A1C (%)   Date Value   01/21/2020 9.0 (H)   09/19/2019 8.1 (H)     LDL Cholesterol Calculated (mg/dL)   Date Value   06/20/2019 65   02/28/2019 117 (H)           Current providers sharing in care for this patient include:   Patient Care Team:  Braxton Boone MD as PCP - General  Braxton Boone MD as Assigned PCP    The following health maintenance items are reviewed in Epic and correct as of today:  Health Maintenance   Topic Date Due     MEDICARE ANNUAL WELLNESS VISIT  02/12/2016     ZOSTER IMMUNIZATION (2 of 3) 12/14/2017     DIABETIC FOOT EXAM  12/20/2019     MICROALBUMIN  02/28/2020     A1C  03/19/2020      "EYE EXAM  04/01/2020     FALL RISK ASSESSMENT  04/29/2020     LIPID  06/20/2020     BMP  08/29/2020     TSH W/FREE T4 REFLEX  09/19/2020     DTAP/TDAP/TD IMMUNIZATION (2 - Td) 02/27/2022     ADVANCE CARE PLANNING  01/21/2025     DEXA  Completed     PHQ-2  Completed     INFLUENZA VACCINE  Completed     PNEUMOCOCCAL IMMUNIZATION 65+ LOW/MEDIUM RISK  Completed     IPV IMMUNIZATION  Aged Out     MENINGITIS IMMUNIZATION  Aged Out     COLONOSCOPY  Discontinued     MAMMO SCREENING  Discontinued     Labs reviewed in EPIC      Review of Systems  Constitutional, HEENT, cardiovascular, pulmonary, gi and gu systems are negative, except as otherwise noted.    OBJECTIVE:   /64   Pulse 71   Temp 97.2  F (36.2  C) (Temporal)   Ht 1.549 m (5' 1\")   Wt 52.9 kg (116 lb 11.2 oz)   SpO2 97%   BMI 22.05 kg/m   Estimated body mass index is 22.05 kg/m  as calculated from the following:    Height as of this encounter: 1.549 m (5' 1\").    Weight as of this encounter: 52.9 kg (116 lb 11.2 oz).  Physical Exam  GENERAL APPEARANCE: healthy, alert and no distress  EYES: Eyes grossly normal to inspection, PERRL and conjunctivae and sclerae normal  HENT: ear canals and TM's normal, nose and mouth without ulcers or lesions, oropharynx clear and oral mucous membranes moist  NECK: no adenopathy, no asymmetry, masses, or scars and thyroid normal to palpation  RESP: lungs clear to auscultation - no rales, rhonchi or wheezes  CV: regular rate and rhythm, normal S1 S2, no S3 or S4, no murmur, click or rub, no peripheral edema and peripheral pulses strong  ABDOMEN: soft, nontender, no hepatosplenomegaly, no masses and bowel sounds normal  MS: no musculoskeletal defects are noted and gait is age appropriate without ataxia  SKIN: no suspicious lesions or rashes  NEURO: Normal strength and tone, sensory exam grossly normal, mentation intact and speech normal  DIABETIC FOOT EXAM: normal DP and PT pulses, no trophic changes or ulcerative lesions, " "normal sensory exam and normal monofilament exam  PSYCH: mentation appears normal and affect normal/bright    Labs reviewed in Epic  Results for orders placed or performed in visit on 01/21/20 (from the past 24 hour(s))   Hemoglobin A1c   Result Value Ref Range    Hemoglobin A1C 9.0 (H) 0 - 5.6 %       ASSESSMENT / PLAN:   1. Medicare annual wellness visit, subsequent  See patient instructions.  She defers further mammography    2. Type 2 diabetes mellitus with microalbuminuria, without long-term current use of insulin (H)  Increase glimepiride to 6 mg.  If we do not see significant improvement which I am doubtful of here in the next month think we should discuss using basal insulin and then discontinuing her oral meds which seem to be failing.  - Hemoglobin A1c  - Albumin Random Urine Quantitative with Creat Ratio  - glimepiride (AMARYL) 2 MG tablet; Take 3 tablets (6 mg) by mouth every morning (before breakfast)  Dispense: 270 tablet; Refill: 1    3. Benign hypertension with CKD (chronic kidney disease) stage III (H)  Continue current meds well controlled    4. Hyperlipidemia LDL goal <100  Continue statin    5. Autoimmune hypothyroidism  Recheck TSH        COUNSELING:  Reviewed preventive health counseling, as reflected in patient instructions    Estimated body mass index is 22.05 kg/m  as calculated from the following:    Height as of this encounter: 1.549 m (5' 1\").    Weight as of this encounter: 52.9 kg (116 lb 11.2 oz).         reports that she has never smoked. She has never used smokeless tobacco.      Appropriate preventive services were discussed with this patient, including applicable screening as appropriate for cardiovascular disease, diabetes, osteopenia/osteoporosis, and glaucoma.  As appropriate for age/gender, discussed screening for colorectal cancer, prostate cancer, breast cancer, and cervical cancer. Checklist reviewing preventive services available has been given to the patient.    Reviewed " patients plan of care and provided an AVS. The Intermediate Care Plan ( asthma action plan, low back pain action plan, and migraine action plan) for Gregoria Coombs meets the Care Plan requirement. This Care Plan has been established and reviewed with the Patient and caregiver.    Counseling Resources:  ATP IV Guidelines  Pooled Cohorts Equation Calculator  Breast Cancer Risk Calculator  FRAX Risk Assessment  ICSI Preventive Guidelines  Dietary Guidelines for Americans, 2010  USDA's MyPlate  ASA Prophylaxis  Lung CA Screening    Braxton Boone MD  [facility]    Identified Health Risks:    The patient was counseled and encouraged to consider modifying their diet and eating habits. She was provided with information on recommended healthy diet options.  The patient reports that she has difficulty with activities of daily living. I have asked that the patient make a follow up appointment in 12 weeks where this issue will be further evaluated and addressed.

## 2020-01-24 ENCOUNTER — TELEPHONE (OUTPATIENT)
Dept: INTERNAL MEDICINE | Facility: CLINIC | Age: 75
End: 2020-01-24

## 2020-01-24 NOTE — TELEPHONE ENCOUNTER
Notes below copied and pasted from result notes:--MR, please advise if any further f/u needed      Notes recorded by Payal Herring on 1/23/2020 at 11:03 AM CST  Talked with Pt's  An and he will discuss with wife if she would like to start insulin. Will call back.  ------    Notes recorded by Braxton Boone MD on 1/22/2020 at 1:52 PM CST  Call pt's   A1c really high- not sure my adjustments at visit will be adequate. I would recommend they consider starting once a day insulin rather than several pills - which we are currently using.  If willing - they should start checking glucose BID and f/u to see me sooner than 3 mo.  Instead 1 mo.

## 2020-03-09 ENCOUNTER — TRANSFERRED RECORDS (OUTPATIENT)
Dept: HEALTH INFORMATION MANAGEMENT | Facility: CLINIC | Age: 75
End: 2020-03-09

## 2020-03-09 LAB — RETINOPATHY: NEGATIVE

## 2020-03-31 ENCOUNTER — TELEPHONE (OUTPATIENT)
Dept: INTERNAL MEDICINE | Facility: CLINIC | Age: 75
End: 2020-03-31

## 2020-03-31 NOTE — TELEPHONE ENCOUNTER
Spoke with pt's , did have some weakness, and fatigue this morning before breakfast BG was 91.  Reports patient rechecked 2 hours after lunch and BG of 208,  no improvement in sx, still fatigued and weak.      Pt's  reports that she has been taking Dayquil cold and flu due to cough and runny nose.  No missed doses in regular medication.     Please advise on f/u needed if any

## 2020-03-31 NOTE — TELEPHONE ENCOUNTER
Reason for Call:  Other call back    Detailed comments: This morning at 9am before breakfast (usual time) patient blood sugar was 91 usually is 125 - 180.  Patient wants to know what should be done since the blood sugar was lower than normal.    Phone Number Patient can be reached at:  [phone]      Best Time: any time    Can we leave a detailed message on this number? YES    Call taken on 3/31/2020 at 3:10 PM by Ny Rendon

## 2020-03-31 NOTE — TELEPHONE ENCOUNTER
91 is ok.  If poor oral intake have him give her only 2 of the glimepiride rather than 3 tablets.   But generally speaking would check temp, monitor cough.  Let us know if symptoms worsen.     Give them general info on covid19 precautions

## 2020-05-15 DIAGNOSIS — E11.29 TYPE 2 DIABETES MELLITUS WITH MICROALBUMINURIA, WITHOUT LONG-TERM CURRENT USE OF INSULIN (H): ICD-10-CM

## 2020-05-15 DIAGNOSIS — R80.9 TYPE 2 DIABETES MELLITUS WITH MICROALBUMINURIA, WITHOUT LONG-TERM CURRENT USE OF INSULIN (H): ICD-10-CM

## 2020-05-15 DIAGNOSIS — E06.3 AUTOIMMUNE HYPOTHYROIDISM: ICD-10-CM

## 2020-05-15 LAB — HBA1C MFR BLD: 8.1 % (ref 0–5.6)

## 2020-05-15 PROCEDURE — 36415 COLL VENOUS BLD VENIPUNCTURE: CPT | Performed by: INTERNAL MEDICINE

## 2020-05-15 PROCEDURE — 83036 HEMOGLOBIN GLYCOSYLATED A1C: CPT | Performed by: INTERNAL MEDICINE

## 2020-05-15 PROCEDURE — 84460 ALANINE AMINO (ALT) (SGPT): CPT | Performed by: INTERNAL MEDICINE

## 2020-05-15 PROCEDURE — 84443 ASSAY THYROID STIM HORMONE: CPT | Performed by: INTERNAL MEDICINE

## 2020-05-15 PROCEDURE — 80061 LIPID PANEL: CPT | Performed by: INTERNAL MEDICINE

## 2020-05-15 PROCEDURE — 80048 BASIC METABOLIC PNL TOTAL CA: CPT | Performed by: INTERNAL MEDICINE

## 2020-05-16 LAB
ALT SERPL W P-5'-P-CCNC: 48 U/L (ref 0–50)
ANION GAP SERPL CALCULATED.3IONS-SCNC: 4 MMOL/L (ref 3–14)
BUN SERPL-MCNC: 28 MG/DL (ref 7–30)
CALCIUM SERPL-MCNC: 10.8 MG/DL (ref 8.5–10.1)
CHLORIDE SERPL-SCNC: 103 MMOL/L (ref 94–109)
CHOLEST SERPL-MCNC: 151 MG/DL
CO2 SERPL-SCNC: 29 MMOL/L (ref 20–32)
CREAT SERPL-MCNC: 1.29 MG/DL (ref 0.52–1.04)
GFR SERPL CREATININE-BSD FRML MDRD: 40 ML/MIN/{1.73_M2}
GLUCOSE SERPL-MCNC: 266 MG/DL (ref 70–99)
HDLC SERPL-MCNC: 50 MG/DL
LDLC SERPL CALC-MCNC: 40 MG/DL
NONHDLC SERPL-MCNC: 101 MG/DL
POTASSIUM SERPL-SCNC: 4.6 MMOL/L (ref 3.4–5.3)
SODIUM SERPL-SCNC: 136 MMOL/L (ref 133–144)
TRIGL SERPL-MCNC: 306 MG/DL
TSH SERPL DL<=0.005 MIU/L-ACNC: 2.83 MU/L (ref 0.4–4)

## 2020-05-24 DIAGNOSIS — E06.3 AUTOIMMUNE HYPOTHYROIDISM: ICD-10-CM

## 2020-05-26 RX ORDER — LEVOTHYROXINE SODIUM 100 UG/1
100 TABLET ORAL DAILY
Qty: 90 TABLET | Refills: 1 | Status: SHIPPED | OUTPATIENT
Start: 2020-05-26 | End: 2020-12-14

## 2020-05-29 ENCOUNTER — VIRTUAL VISIT (OUTPATIENT)
Dept: INTERNAL MEDICINE | Facility: CLINIC | Age: 75
End: 2020-05-29
Payer: COMMERCIAL

## 2020-05-29 DIAGNOSIS — R80.9 TYPE 2 DIABETES MELLITUS WITH MICROALBUMINURIA, WITHOUT LONG-TERM CURRENT USE OF INSULIN (H): ICD-10-CM

## 2020-05-29 DIAGNOSIS — E11.29 TYPE 2 DIABETES MELLITUS WITH MICROALBUMINURIA, WITHOUT LONG-TERM CURRENT USE OF INSULIN (H): ICD-10-CM

## 2020-05-29 PROCEDURE — 99213 OFFICE O/P EST LOW 20 MIN: CPT | Mod: TEL | Performed by: INTERNAL MEDICINE

## 2020-05-29 RX ORDER — PIOGLITAZONEHYDROCHLORIDE 30 MG/1
30 TABLET ORAL DAILY
Qty: 90 TABLET | Refills: 0 | Status: SHIPPED | OUTPATIENT
Start: 2020-05-29 | End: 2020-10-08

## 2020-05-29 RX ORDER — GLIMEPIRIDE 2 MG/1
4 TABLET ORAL
Qty: 180 TABLET | Refills: 0 | Status: SHIPPED | OUTPATIENT
Start: 2020-05-29 | End: 2020-10-08

## 2020-05-29 NOTE — PROGRESS NOTES
"Gregoria Gan is a 75 year old female who is being evaluated via a billable telephone visit.      The patient has been notified of following:     \"This telephone visit will be conducted via a call between you and your physician/provider. We have found that certain health care needs can be provided without the need for a physical exam.  This service lets us provide the care you need with a short phone conversation.  If a prescription is necessary we can send it directly to your pharmacy.  If lab work is needed we can place an order for that and you can then stop by our lab to have the test done at a later time.    Telephone visits are billed at different rates depending on your insurance coverage. During this emergency period, for some insurers they may be billed the same as an in-person visit.  Please reach out to your insurance provider with any questions.    If during the course of the call the physician/provider feels a telephone visit is not appropriate, you will not be charged for this service.\"    Patient has given verbal consent for Telephone visit?  Yes    What phone number would you like to be contacted at? [phone]    How would you like to obtain your AVS? Mail a copy    Subjective     Gregoria Gan is a 75 year old female who presents via phone visit today for the following health issues:    HPI  Diabetes Follow-up  glimepiride 4 mg- went to 6 mg ofr short term and then reduced dose back to 4 mg when fasting glucose 90s.    How often are you checking your blood sugar? A few times a week  What time of day are you checking your blood sugars (select all that apply)?  Before meals  Have you had any blood sugars above 200?  Yes frequent over 200+  Have you had any blood sugars below 70?  No    What symptoms do you notice when your blood sugar is low?  None    What concerns do you have today about your diabetes? None     Do you have any of these symptoms? (Select all that apply)  Burning in " feet      BP Readings from Last 2 Encounters:   01/21/20 124/64   09/23/19 128/66     Hemoglobin A1C (%)   Date Value   05/15/2020 8.1 (H)   01/21/2020 9.0 (H)     LDL Cholesterol Calculated (mg/dL)   Date Value   05/15/2020 40   06/20/2019 65                        RESPIRATORY SYMPTOMS      Duration: 2+ months    Description  cough and productive clear phlegm    Severity: moderate    Accompanying signs and symptoms: None    History (predisposing factors):  none    Precipitating or alleviating factors: None    Therapies tried and outcome:  nasal spray/wash - with no improvement, cough drops with no change          Reviewed and updated as needed this visit by Provider         Review of Systems   Constitutional, HEENT, cardiovascular, pulmonary, gi and gu systems are negative, except as otherwise noted.       Objective   Reported vitals:  There were no vitals taken for this visit.   healthy, alert and no distress  PSYCH: Alert and oriented times 3; coherent speech, normal   rate and volume, able to articulate logical thoughts, able   to abstract reason, no tangential thoughts, no hallucinations   or delusions  Her affect is normal  RESP: No cough, no audible wheezing, able to talk in full sentences  Remainder of exam unable to be completed due to telephone visits    Labs reviewed in Epic        Assessment/Plan:  1. Type 2 diabetes mellitus with microalbuminuria, without long-term current use of insulin (H)  - continue 4 mg daily  -add back low dose metformin - ok with CKD status at this dose.   -both pt and  adamant about avoiding insulin at this time  - glimepiride (AMARYL) 2 MG tablet; Take 2 tablets (4 mg) by mouth every morning (before breakfast)  Dispense: 180 tablet; Refill: 0  - metFORMIN (GLUCOPHAGE) 500 MG tablet; Take 0.5 tablets (250 mg) by mouth daily (with breakfast)  Dispense: 45 tablet; Refill: 0  - pioglitazone (ACTOS) 30 MG tablet; Take 1 tablet (30 mg) by mouth daily  Dispense: 90 tablet;  Refill: 0    Return in about 3 months (around 8/29/2020) for Diabetes recheck with labs before.      Phone call duration:  22 minutes    Braxton Boone MD

## 2020-06-04 ENCOUNTER — VIRTUAL VISIT (OUTPATIENT)
Dept: INTERNAL MEDICINE | Facility: CLINIC | Age: 75
End: 2020-06-04
Payer: COMMERCIAL

## 2020-06-04 DIAGNOSIS — R05.3 CHRONIC COUGH: Primary | ICD-10-CM

## 2020-06-04 PROCEDURE — 99214 OFFICE O/P EST MOD 30 MIN: CPT | Mod: TEL | Performed by: INTERNAL MEDICINE

## 2020-06-04 RX ORDER — FLUTICASONE PROPIONATE 50 MCG
2 SPRAY, SUSPENSION (ML) NASAL DAILY
Qty: 16 G | Refills: 11 | Status: SHIPPED | OUTPATIENT
Start: 2020-06-04 | End: 2021-09-30

## 2020-06-04 RX ORDER — CETIRIZINE HYDROCHLORIDE 10 MG/1
10 TABLET ORAL DAILY
Qty: 30 TABLET | Refills: 2 | Status: SHIPPED | OUTPATIENT
Start: 2020-06-04 | End: 2020-10-08

## 2020-06-04 NOTE — PROGRESS NOTES
"Gregoria Gan is a 75 year old female who is being evaluated via a billable telephone visit.      The patient has been notified of following:     \"This telephone visit will be conducted via a call between you and your physician/provider. We have found that certain health care needs can be provided without the need for a physical exam.  This service lets us provide the care you need with a short phone conversation.  If a prescription is necessary we can send it directly to your pharmacy.  If lab work is needed we can place an order for that and you can then stop by our lab to have the test done at a later time.    Telephone visits are billed at different rates depending on your insurance coverage. During this emergency period, for some insurers they may be billed the same as an in-person visit.  Please reach out to your insurance provider with any questions.    If during the course of the call the physician/provider feels a telephone visit is not appropriate, you will not be charged for this service.\"    Patient has given verbal consent for Telephone visit?  Yes    What phone number would you like to be contacted at? [phone]    How would you like to obtain your AVS? Mail a copy    Subjective     Gregoria Gan is a 75 year old female who presents via phone visit today for the following health issues:    HPI  Chronic cough      Duration: 2+ months    Description (location/character/radiation): chronic dry cough for 2+ months, \"I cough about 3-4 times a day, everyday\", pt denies any fever, any sob, any sinus sx, or any other sx    Intensity:  moderate    Accompanying signs and symptoms: as above    History (similar episodes/previous evaluation):  states similar cough about 10 years ago    Precipitating or alleviating factors: None    Therapies tried and outcome: cold and flu medication OTC, cough dm OTC with no change and improvement                  Reviewed and updated as needed this visit by " Provider         Review of Systems   Constitutional, HEENT, cardiovascular, pulmonary, gi and gu systems are negative, except as otherwise noted.       Objective   Reported vitals:  There were no vitals taken for this visit.   healthy, alert and no distress  PSYCH: Alert and oriented times 3; coherent speech, normal   rate and volume, able to articulate logical thoughts, able   to abstract reason, no tangential thoughts, no hallucinations   or delusions  Her affect is normal  RESP: No cough, no audible wheezing, able to talk in full sentences  Remainder of exam unable to be completed due to telephone visits    Labs reviewed in Epic        Assessment/Plan:  1. Chronic cough  Try AH and nasal steroid for allergies/post nasal   If fails consider GERD- empiric PPI  - fluticasone (FLONASE) 50 MCG/ACT nasal spray; Spray 2 sprays into both nostrils daily  Dispense: 16 g; Refill: 11  - cetirizine (ZYRTEC) 10 MG tablet; Take 1 tablet (10 mg) by mouth daily  Dispense: 30 tablet; Refill: 2    No follow-ups on file.      Phone call duration:  15 minutes    Braxton Boone MD

## 2020-06-12 ENCOUNTER — TELEPHONE (OUTPATIENT)
Dept: INTERNAL MEDICINE | Facility: CLINIC | Age: 75
End: 2020-06-12

## 2020-06-12 NOTE — TELEPHONE ENCOUNTER
Reason for Call:  Other call back    Detailed comments: Gregoria's , Jolanta Carr, called to say they live with their grand niece.  She is being tested next week for Covid-19.    Jolanta is asking if Gregoria needs to be tested.  Jolanta goes to a [facility] clinic and will check with his provider for himself.    Phone Number Patient can be reached at: Home number on file [phone] (home)    Best Time: any    Can we leave a detailed message on this number? YES    Call taken on 6/12/2020 at 4:42 PM by Debbie Villeda

## 2020-06-15 NOTE — TELEPHONE ENCOUNTER
Spoke spouse regarding patient potential exposure.    Per spouse niece was not showing any symptoms. Patient as of today is not having any symptoms. No fever. Advised if patient cough from last week is not resolved or getting any worse. Per spouse chronic cough with Flonase and allergy pill is a little better. Should niece have positive test, should informed clinic. For now patient and himself should isolate from Niece.    COVID 19 Nurse Triage Plan/Patient Instructions    Please be aware that novel coronavirus (COVID-19) may be circulating in the community. If you develop symptoms such as fever, cough, or SOB or if you have concerns about the presence of another infection including coronavirus (COVID-19), please contact your health care provider or visit [email or web address].     Disposition/Instructions    Patient to stay at home and follow home care protocol based instructions.   Additional COVID19 information to add for patients.   How can I protect others?  If you have symptoms (fever, cough, body aches or trouble breathing): Stay home and away from others (self-isolate) until:    At least 10 days have passed since your symptoms started. And     You ve had no fever--and no medicine that reduces fever--for 3 full days (72 hours). And      Your other symptoms have resolved (gotten better).     If you don t have symptoms, but a test showed that you have COVID-19 (you tested positive):    Stay home and away from others (self-isolate) until at least 10 days have passed since the date of your first positive COVID-19 test.    During this time:    Stay in your own room, even for meals. Use your own bathroom if you can.     Stay away from others in your home. No hugging, kissing or shaking hands. No visitors.    Don t go to work, school or anywhere else.     Clean  high touch  surfaces often (doorknobs, counters, handles, etc.). Use a household cleaning spray or wipes. You ll find a full list on the EPA website:   www.epa.gov/pesticide-registration/list-n-disinfectants-use-against-sars-cov-2.    Cover your mouth and nose with a mask, tissue or washcloth to avoid spreading germs.    Wash your hands and face often. Use soap and water.    Caregivers in these groups are at risk for severe illness due to COVID-19:  o People 65 years and older  o People who live in a nursing home or long-term care facility  o People with chronic disease (lung, heart, cancer, diabetes, kidney, liver, immunologic)  o People who have a weakened immune system, including those who:  - Are in cancer treatment  - Take medicine that weakens the immune system, such as corticosteroids  - Had a bone marrow or organ transplant  - Have an immune deficiency  - Have poorly controlled HIV or AIDS  - Are obese (body mass index of 40 or higher)  - Smoke regularly    Caregivers should wear gloves while washing dishes, handling laundry and cleaning bedrooms and bathrooms.    Use caution when washing and drying laundry: Don t shake dirty laundry, and use the warmest water setting that you can.    For more tips, go to www.cdc.gov/coronavirus/2019-ncov/downloads/10Things.pdf.    How can I take care of myself?  1. Get lots of rest. Drink extra fluids (unless a doctor has told you not to).     2. Take Tylenol (acetaminophen) for fever or pain. If you have liver or kidney problems, ask your family doctor if it s okay to take Tylenol.     Adults can take either:     650 mg (two 325 mg pills) every 4 to 6 hours, or     1,000 mg (two 500 mg pills) every 8 hours as needed.     Note: Don t take more than 3,000 mg in one day.   Acetaminophen is found in many medicines (both prescribed and over-the-counter medicines). Read all labels to be sure you don t take too much.     For children, check the Tylenol bottle for the right dose. The dose is based on the child s age or weight.    3. If you have other health problems (like cancer, heart failure, an organ transplant or severe  kidney disease): Call your specialty clinic if you don t feel better in the next 2 days.    4. Know when to call 911: Emergency warning signs include:    Trouble breathing or shortness of breath    Pain or pressure in the chest that doesn t go away    Feeling confused like you haven t felt before, or not being able to wake up    Bluish-colored lips or face    What are the symptoms of COVID-19?     The most common symptoms are cough, fever and trouble breathing.     Less common symptoms include body aches, chills, diarrhea (loose, watery poops), fatigue (feeling very tired), headache, runny nose, sore throat and loss of smell.    COVID-19 can cause severe coughing (bronchitis) and lung infection (pneumonia).    How does it spread?     The virus may spread when a person coughs or sneezes into the air. The virus can travel about 6 feet this way, and it can live on surfaces.      Common  (household disinfectants) will kill the virus.    Who is at risk?  Anyone can catch COVID-19 if they re around someone who has the virus.    How can others protect themselves?     Stay away from people who have COVID-19 (or symptoms of COVID-19).    Wash hands often with soap and water. Or, use hand  with at least 60% alcohol.    Avoid touching the eyes, nose or mouth.     Wear a face mask when you go out in public, when sick or when caring for a sick person.    Where can I get more information?    [facility]: About COVID-19: [email or web address]    CDC: What to Do If You re Sick: www.cdc.gov/coronavirus/2019-ncov/about/steps-when-sick.html    CDC: Ending Home Isolation: www.cdc.gov/coronavirus/2019-ncov/hcp/disposition-in-home-patients.html     CDC: Caring for Someone: www.cdc.gov/coronavirus/2019-ncov/if-you-are-sick/care-for-someone.html     [facility]: Interim Guidance for Hospital Discharge to Home: www.health.[facility].mn.us/diseases/coronavirus/hcp/hospdischarge.pdf    [facility]  trials (COVID-19 research studies): clinicalaffairs.[facility]/umn-clinical-trials     Below are the COVID-19 hotlines at the [place] [facility] ([facility]). Interpreters are available.   o For health questions: Call 837-964-3100 or 1-240.993.5061 (7 a.m. to 7 p.m.)  o For questions about schools and childcare: Call 084-249-0324 or 1-569.453.4315 (7 a.m. to 7 p.m.)          Thank you for taking steps to prevent the spread of this virus.  o Limit your contact with others.  o Wear a simple mask to cover your cough.  o Wash your hands well and often.    Resources    [facility]: About COVID-19: [email or web address]    CDC: What to Do If You're Sick: www.cdc.gov/coronavirus/2019-ncov/about/steps-when-sick.html    CDC: Ending Home Isolation: www.cdc.gov/coronavirus/2019-ncov/hcp/disposition-in-home-patients.html     CDC: Caring for Someone: www.cdc.gov/coronavirus/2019-ncov/if-you-are-sick/care-for-someone.html     [facility]: Interim Guidance for Hospital Discharge to Home: www.[facility].mn./diseases/coronavirus/hcp/hospdischarge.pdf    [facility] clinical trials (COVID-19 research studies): clinicalaffairs.[facility]/n-clinical-trials     Below are the COVID-19 hotlines at the [place] [facility] ([facility]). Interpreters are available.   o For health questions: Call 602-701-4519 or 1-131.597.5677 (7 a.m. to 7 p.m.)  o For questions about schools and childcare: Call 411-232-1656 or 1-419.907.2761 (7 a.m. to 7 p.m.)         Should follow up with PCP and discuss need for test.    Alina HELTONN, RN, PHN

## 2020-07-07 ENCOUNTER — APPOINTMENT (OUTPATIENT)
Dept: INTERPRETER SERVICES | Facility: CLINIC | Age: 75
End: 2020-07-07
Payer: COMMERCIAL

## 2020-07-10 DIAGNOSIS — E11.29 TYPE 2 DIABETES MELLITUS WITH MICROALBUMINURIA, WITHOUT LONG-TERM CURRENT USE OF INSULIN (H): ICD-10-CM

## 2020-07-10 DIAGNOSIS — R80.9 TYPE 2 DIABETES MELLITUS WITH MICROALBUMINURIA, WITHOUT LONG-TERM CURRENT USE OF INSULIN (H): ICD-10-CM

## 2020-07-10 LAB — HBA1C MFR BLD: 8.5 % (ref 0–5.6)

## 2020-07-10 PROCEDURE — 83036 HEMOGLOBIN GLYCOSYLATED A1C: CPT | Performed by: INTERNAL MEDICINE

## 2020-07-10 PROCEDURE — 36415 COLL VENOUS BLD VENIPUNCTURE: CPT | Performed by: INTERNAL MEDICINE

## 2020-07-13 ENCOUNTER — OFFICE VISIT (OUTPATIENT)
Dept: INTERNAL MEDICINE | Facility: CLINIC | Age: 75
End: 2020-07-13
Payer: COMMERCIAL

## 2020-07-13 VITALS
TEMPERATURE: 99 F | HEART RATE: 73 BPM | OXYGEN SATURATION: 94 % | BODY MASS INDEX: 21.69 KG/M2 | SYSTOLIC BLOOD PRESSURE: 122 MMHG | WEIGHT: 114.9 LBS | DIASTOLIC BLOOD PRESSURE: 62 MMHG | HEIGHT: 61 IN

## 2020-07-13 DIAGNOSIS — R80.9 TYPE 2 DIABETES MELLITUS WITH MICROALBUMINURIA, WITH LONG-TERM CURRENT USE OF INSULIN (H): Primary | ICD-10-CM

## 2020-07-13 DIAGNOSIS — E11.29 TYPE 2 DIABETES MELLITUS WITH MICROALBUMINURIA, WITH LONG-TERM CURRENT USE OF INSULIN (H): Primary | ICD-10-CM

## 2020-07-13 DIAGNOSIS — Z79.4 TYPE 2 DIABETES MELLITUS WITH MICROALBUMINURIA, WITH LONG-TERM CURRENT USE OF INSULIN (H): Primary | ICD-10-CM

## 2020-07-13 DIAGNOSIS — I10 HTN (HYPERTENSION), BENIGN: ICD-10-CM

## 2020-07-13 PROCEDURE — 99214 OFFICE O/P EST MOD 30 MIN: CPT | Performed by: INTERNAL MEDICINE

## 2020-07-13 ASSESSMENT — ENCOUNTER SYMPTOMS
CONSTITUTIONAL NEGATIVE: 1
EYES NEGATIVE: 1
RESPIRATORY NEGATIVE: 1
GASTROINTESTINAL NEGATIVE: 1

## 2020-07-13 ASSESSMENT — MIFFLIN-ST. JEOR: SCORE: 948.56

## 2020-07-13 NOTE — NURSING NOTE
"Chief Complaint   Patient presents with     Diabetes     BP (!) 148/62   Pulse 73   Temp 99  F (37.2  C) (Temporal)   Ht 1.549 m (5' 1\")   Wt 52.1 kg (114 lb 14.4 oz)   SpO2 94%   BMI 21.71 kg/m   Estimated body mass index is 21.71 kg/m  as calculated from the following:    Height as of this encounter: 1.549 m (5' 1\").    Weight as of this encounter: 52.1 kg (114 lb 14.4 oz).        Health Maintenance due pending provider review:  shingles    Pt states complete    Marisela Phillips, CMA  "

## 2020-07-13 NOTE — PATIENT INSTRUCTIONS
Patient Education     Discharge Instructions: Using Injection Pens    Your healthcare provider has prescribed a medicine that you can give yourself using an injection pen. One medicine that is commonly given with an injection pen is insulin. Injection pens are popular because they are easy to use. Also, many people like how pens look better than syringes.  Injection pens can be disposable or nondisposable:    Disposable pens come already filled (prefilled) with a set amount of medicine. Once you inject the medicine, you throw the pen away.    With nondisposable pens, you replace the medicine cartridge when it is empty.  Both types of pens need a pen needle. This is screwed onto the tip of the pen before each injection. Pen needles come in different lengths and thicknesses. Always throw away needles right after you use them. Never reuse needles.  Step 1. Gather your supplies    Alcohol swabs    Injector pen    Pen needle    Cartridge if pen is the nondisposable type    Special container to throw out the used needles and disposable pens (sharps container). You can buy a sharps container at a [employer] or medical supply store. You can also use an empty laundry detergent bottle, or any other puncture-proof container and lid.  Step 2. Prepare the pen  Each pen will come with its own special instructions. Read the directions that came with your pen. Discuss the instructions with your diabetes care team or diabetes educator before injecting insulin. In general here is what to do:    Wash your hands.    Remove the pen cap.    Check the medicine. Make sure it is the type your provider prescribed. Check that it has not . Also check that it's not discolored, frosted, or lumpy. If the medicine doesn't look right to you, don t use it. Get a new cartridge or a new disposable pen. Never share injection pens or medicine cartridges.    Some medicines need to be mixed. You can do this by rolling the pen between your palms  about 20 times. You can also tip the pen back and forth.    Attach a needle to your pen. Read the directions that came with your pen. They will give you the steps for attaching a needle. If you re using a nondisposable pen, don t leave the needle attached to the pen between shots.  Step 3. Prime the pen and set the dose  Prime your pen and make sure that it's working by doing a trial shot in the air before actually injecting your medicine. Then set the dose.    Dial the pen to give 2 or 3 units of medicine.    Hold the pen with the needle pointing up in the air.    Tap the barrel of the pen. This will make sure that any air bubbles in the cartridge float to the top of the cartridge.    Push down firmly on the pen's injector button. This will send medicine into the air. You should see a couple of drops of medicine come out of the needle. If nothing comes out, try doing another air shot. If medicine still doesn't come out after a second try, your pen may be low on medicine. Or the needle may not be connected properly. Look at the troubleshooting tips in the directions that came with your pen.    Set your dose. Dial the pen to give the amount of medicine you need to take. As you turn the dial, you should hear a clicking sound. Your pen is now ready to use.  Step 4. Inject your medicine    Choose an injection site. The belly (abdomen), upper arms, thighs, and buttocks are the most common sites to use. Don't use sites that are close to a mole or scar. Make sure sites are more than 2 inches away from your belly button.    Make sure the site is clean. Clean it with an alcohol swab. Let it dry.    Pinch up a fold of skin around the site you've picked. Hold it firmly with one hand.    In your other hand, hold the injection pen like a pencil.    Put the needle straight into the pinched-up skin. Thin adults or children may need to inject the needle at a slight angle. Your healthcare provider will show you what is best for  you.    Make sure the needle gets all the way into the fatty tissue below the skin.    Push the pen injection button. Unless you take a very small dose, the injection should take a couple of seconds. You may have to hold the pen in 5 to 10 seconds after injecting the insulin. This will depend on the insulin pen you are using. Carefully follow the instructions that came with your pen. Or follow the advice your diabetes care team or diabetes educator gives you.    Let go of the skin and remove the needle from your skin.  Step 5. After the injection    If you are using a nondisposable pen, remove the needle by unscrewing it.    Put any used needles or disposable pens into the sharps container. Make sure that needles point down. Never put your fingers into the container.    When the sharps container is full, take it back to your healthcare facility. The staff will get rid of it for you.  Follow-up care  Follow up with your healthcare provider, or as advised.     When to seek medical care   Call your healthcare provider right away if you have any of the following:    Problems that stop you from giving your injection    Bleeding at the injection site for more than 10 minutes    Pain at the injection site that does not go away    Accidental or improper injection, such as:  ? Injecting the medicine in the wrong area  ? Injecting too much medicine    Rash at the injection site    Fever of 100.4 F (38 C) or higher, or as directed by your healthcare provider    Redness, warmth, swelling, or drainage at the injection site    Signs of allergic reaction. These include trouble breathing, hives, or rash.   Date Last Reviewed: 7/1/2016 2000-2019 [employer]. [address] [place] [address], [place] [id number]. All rights reserved. This information is not intended as a substitute for professional medical care. Always follow your healthcare professional's instructions.    Until starting insulin continue your present  medications    Once you start the insulin stop your glimepiride and pioglitazone

## 2020-07-13 NOTE — PROGRESS NOTES
Subjective     Gregoria Gan is a 75 year old female who presents to clinic today for the following health issues:    HPI   Diabetes Follow-up  Currently on low-dose metformin due to GFR, pioglitazone and glimepiride.  Unfortunately with this regimen her control has continued to worsen over time.  Frequently a.m. numbers starting in the 160s 170s with afternoon and evening easily 250+  How often are you checking your blood sugar? Two times daily  Blood sugar testing frequency justification:  Uncontrolled diabetes  What time of day are you checking your blood sugars (select all that apply)?  Before meals  Have you had any blood sugars above 200?  Yes frequent  Have you had any blood sugars below 70?  No    What symptoms do you notice when your blood sugar is low?  None    What concerns do you have today about your diabetes? None     Do you have any of these symptoms? (Select all that apply)  No numbness or tingling in feet.  No redness, sores or blisters on feet.  No complaints of excessive thirst.  No reports of blurry vision.  No significant changes to weight.      BP Readings from Last 2 Encounters:   07/13/20 122/62   01/21/20 124/64     Hemoglobin A1C (%)   Date Value   07/10/2020 8.5 (H)   05/15/2020 8.1 (H)     LDL Cholesterol Calculated (mg/dL)   Date Value   05/15/2020 40   06/20/2019 65           How many servings of fruits and vegetables do you eat daily?  2-3    On average, how many sweetened beverages do you drink each day (Examples: soda, juice, sweet tea, etc.  Do NOT count diet or artificially sweetened beverages)?   0    How many days per week do you exercise enough to make your heart beat faster? 3 or less    How many minutes a day do you exercise enough to make your heart beat faster? 10 - 19    How many days per week do you miss taking your medication? 0    Hyperlipidemia Follow-Up      Are you regularly taking any medication or supplement to lower your cholesterol?   Yes- Statin    Are you  "having muscle aches or other side effects that you think could be caused by your cholesterol lowering medication?  No    Hypertension Follow-up      Do you check your blood pressure regularly outside of the clinic? Yes     Are you following a low salt diet? Yes    Are your blood pressures ever more than 140 on the top number (systolic) OR more   than 90 on the bottom number (diastolic), for example 140/90? No    Reviewed and updated as needed this visit by Provider         Review of Systems   Constitutional: Negative.    Eyes: Negative.    Respiratory: Negative.    Gastrointestinal: Negative.           Objective    /62   Pulse 73   Temp 99  F (37.2  C) (Temporal)   Ht 1.549 m (5' 1\")   Wt 52.1 kg (114 lb 14.4 oz)   SpO2 94%   BMI 21.71 kg/m    Body mass index is 21.71 kg/m .  Physical Exam   GENERAL APPEARANCE: alert and no distress  HENT: nose and mouth without ulcers or lesions  NECK: no adenopathy  RESP: lungs clear to auscultation - no rales, rhonchi or wheezes  CV: regular rates and rhythm    Labs reviewed in Epic        Assessment & Plan     1. Type 2 diabetes mellitus with microalbuminuria, with long-term current use of insulin (H)  3 drug regimen thus far has been unsuccessful and not even close to getting her control where it needs to be.  I recommend switching to basal insulin glargine once daily titrating the dose to and glucose less than 140.  Once insulin started would discontinue the glimepiride and the pioglitazone.  If possible continue metformin at maximum dose for her GFR.  - AMBULATORY ADULT DIABETES EDUCATOR REFERRAL; Future  - Basic metabolic panel; Future  - Hemoglobin A1c; Future  - insulin glargine (LANTUS PEN) 100 UNIT/ML pen; Inject 10 Units Subcutaneous every morning (Titrate upwards per MD/CDE instructions. Max 25 units/dasy)  Dispense: 15 mL; Refill: 0  - insulin pen needle (31G X 5 MM) 31G X 5 MM miscellaneous; Use 1 pen needles daily or as directed.  Dispense: 90 each; " Refill: 11       Regular exercise  See Patient Instructions    Return in about 3 months (around 10/13/2020) for Lab Work, Diabetes recheck with labs before.    Braxton Boone MD  [facility]

## 2020-07-14 RX ORDER — AMLODIPINE BESYLATE 10 MG/1
10 TABLET ORAL DAILY
Qty: 90 TABLET | Refills: 1 | Status: SHIPPED | OUTPATIENT
Start: 2020-07-14 | End: 2021-01-04

## 2020-07-14 RX ORDER — LOSARTAN POTASSIUM 50 MG/1
TABLET ORAL
Qty: 90 TABLET | Refills: 1 | Status: SHIPPED | OUTPATIENT
Start: 2020-07-14 | End: 2021-01-04

## 2020-08-19 ENCOUNTER — TELEPHONE (OUTPATIENT)
Dept: INTERNAL MEDICINE | Facility: CLINIC | Age: 75
End: 2020-08-19

## 2020-08-19 NOTE — TELEPHONE ENCOUNTER
Diabetes Education Scheduling Outreach #1:    Call to patient to schedule. Left message with phone number to call to schedule.    Plan for 2nd outreach attempt within 1 week.    Ashley Dawson  [place] [facility]  Diabetes and Nutrition Scheduling

## 2020-08-23 DIAGNOSIS — R80.9 TYPE 2 DIABETES MELLITUS WITH MICROALBUMINURIA, WITHOUT LONG-TERM CURRENT USE OF INSULIN (H): ICD-10-CM

## 2020-08-23 DIAGNOSIS — E11.29 TYPE 2 DIABETES MELLITUS WITH MICROALBUMINURIA, WITHOUT LONG-TERM CURRENT USE OF INSULIN (H): ICD-10-CM

## 2020-10-08 ENCOUNTER — OFFICE VISIT (OUTPATIENT)
Dept: INTERNAL MEDICINE | Facility: CLINIC | Age: 75
End: 2020-10-08
Payer: COMMERCIAL

## 2020-10-08 ENCOUNTER — TELEPHONE (OUTPATIENT)
Dept: INTERNAL MEDICINE | Facility: CLINIC | Age: 75
End: 2020-10-08

## 2020-10-08 VITALS
HEART RATE: 72 BPM | WEIGHT: 115.5 LBS | SYSTOLIC BLOOD PRESSURE: 124 MMHG | HEIGHT: 61 IN | DIASTOLIC BLOOD PRESSURE: 66 MMHG | OXYGEN SATURATION: 96 % | BODY MASS INDEX: 21.81 KG/M2 | TEMPERATURE: 98.4 F

## 2020-10-08 DIAGNOSIS — E06.3 AUTOIMMUNE HYPOTHYROIDISM: ICD-10-CM

## 2020-10-08 DIAGNOSIS — R29.6 FALLS FREQUENTLY: Primary | ICD-10-CM

## 2020-10-08 DIAGNOSIS — Z23 NEED FOR VACCINATION: ICD-10-CM

## 2020-10-08 DIAGNOSIS — R80.9 TYPE 2 DIABETES MELLITUS WITH MICROALBUMINURIA, WITHOUT LONG-TERM CURRENT USE OF INSULIN (H): Primary | ICD-10-CM

## 2020-10-08 DIAGNOSIS — R29.6 FALLS FREQUENTLY: ICD-10-CM

## 2020-10-08 DIAGNOSIS — I10 HTN (HYPERTENSION), BENIGN: ICD-10-CM

## 2020-10-08 DIAGNOSIS — E11.29 TYPE 2 DIABETES MELLITUS WITH MICROALBUMINURIA, WITHOUT LONG-TERM CURRENT USE OF INSULIN (H): Primary | ICD-10-CM

## 2020-10-08 DIAGNOSIS — M62.81 MUSCLE WEAKNESS (GENERALIZED): ICD-10-CM

## 2020-10-08 LAB
ALBUMIN SERPL-MCNC: 3.9 G/DL (ref 3.4–5)
ALP SERPL-CCNC: 85 U/L (ref 40–150)
ALT SERPL W P-5'-P-CCNC: 72 U/L (ref 0–50)
ANION GAP SERPL CALCULATED.3IONS-SCNC: 5 MMOL/L (ref 3–14)
AST SERPL W P-5'-P-CCNC: 53 U/L (ref 0–45)
BILIRUB SERPL-MCNC: 1.1 MG/DL (ref 0.2–1.3)
BUN SERPL-MCNC: 18 MG/DL (ref 7–30)
CALCIUM SERPL-MCNC: 11.1 MG/DL (ref 8.5–10.1)
CHLORIDE SERPL-SCNC: 106 MMOL/L (ref 94–109)
CO2 SERPL-SCNC: 26 MMOL/L (ref 20–32)
CREAT SERPL-MCNC: 1.09 MG/DL (ref 0.52–1.04)
ERYTHROCYTE [DISTWIDTH] IN BLOOD BY AUTOMATED COUNT: 13.5 % (ref 10–15)
GFR SERPL CREATININE-BSD FRML MDRD: 49 ML/MIN/{1.73_M2}
GLUCOSE SERPL-MCNC: 154 MG/DL (ref 70–99)
HBA1C MFR BLD: 8.7 % (ref 0–5.6)
HCT VFR BLD AUTO: 41.9 % (ref 35–47)
HGB BLD-MCNC: 14.1 G/DL (ref 11.7–15.7)
MCH RBC QN AUTO: 31.3 PG (ref 26.5–33)
MCHC RBC AUTO-ENTMCNC: 33.7 G/DL (ref 31.5–36.5)
MCV RBC AUTO: 93 FL (ref 78–100)
PLATELET # BLD AUTO: 247 10E9/L (ref 150–450)
POTASSIUM SERPL-SCNC: 3.6 MMOL/L (ref 3.4–5.3)
PROT SERPL-MCNC: 8.5 G/DL (ref 6.8–8.8)
RBC # BLD AUTO: 4.5 10E12/L (ref 3.8–5.2)
SODIUM SERPL-SCNC: 137 MMOL/L (ref 133–144)
WBC # BLD AUTO: 8 10E9/L (ref 4–11)

## 2020-10-08 PROCEDURE — 80053 COMPREHEN METABOLIC PANEL: CPT | Performed by: INTERNAL MEDICINE

## 2020-10-08 PROCEDURE — 83036 HEMOGLOBIN GLYCOSYLATED A1C: CPT | Performed by: INTERNAL MEDICINE

## 2020-10-08 PROCEDURE — 99214 OFFICE O/P EST MOD 30 MIN: CPT | Mod: 25 | Performed by: INTERNAL MEDICINE

## 2020-10-08 PROCEDURE — 36415 COLL VENOUS BLD VENIPUNCTURE: CPT | Performed by: INTERNAL MEDICINE

## 2020-10-08 PROCEDURE — 90662 IIV NO PRSV INCREASED AG IM: CPT | Performed by: INTERNAL MEDICINE

## 2020-10-08 PROCEDURE — 85027 COMPLETE CBC AUTOMATED: CPT | Performed by: INTERNAL MEDICINE

## 2020-10-08 PROCEDURE — G0008 ADMIN INFLUENZA VIRUS VAC: HCPCS | Performed by: INTERNAL MEDICINE

## 2020-10-08 ASSESSMENT — ENCOUNTER SYMPTOMS
GASTROINTESTINAL NEGATIVE: 1
DIZZINESS: 1
WEAKNESS: 1
MUSCULOSKELETAL NEGATIVE: 1
FREQUENCY: 0
CARDIOVASCULAR NEGATIVE: 1
LIGHT-HEADEDNESS: 0
RESPIRATORY NEGATIVE: 1
DYSURIA: 0
CONSTITUTIONAL NEGATIVE: 1
NUMBNESS: 0
HEADACHES: 0
PARESTHESIAS: 0

## 2020-10-08 ASSESSMENT — MIFFLIN-ST. JEOR: SCORE: 951.28

## 2020-10-08 NOTE — NURSING NOTE
"Chief Complaint   Patient presents with     Diabetes     /66   Pulse 72   Temp 98.4  F (36.9  C) (Temporal)   Ht 1.549 m (5' 1\")   Wt 52.4 kg (115 lb 8 oz)   SpO2 96%   BMI 21.82 kg/m   Estimated body mass index is 21.82 kg/m  as calculated from the following:    Height as of this encounter: 1.549 m (5' 1\").    Weight as of this encounter: 52.4 kg (115 lb 8 oz).            Marisela Phillips CMA  "

## 2020-10-08 NOTE — TELEPHONE ENCOUNTER
Received call from [facility] that patient is calling saying he needs physical therapy because fell on face and hurt lips and then fell and hurt shoulder. [facility] referring to patient as male but gave me this patient's name and -who is a female, [facility] thinks maybe male on phone with them is referring to his wife (this patient?) Says he called [facility] but was told they could not help him so he called [facility]. If wants to be seen at [facility], would need referral. [facility] gave me this patient's (Gregoria's) name and .     Attempted to call patient/ to clarify but no answer and no voicemail.

## 2020-10-08 NOTE — PROGRESS NOTES
"Subjective     Gregoria Gan is a 75 year old female who presents to clinic today for the following health issues:    HPI         Diabetes Follow-up  Since last seen she is started on a once a day insulin injection.  Currently she is taking 10 units.  According to her  numbers are running between 130 160 in the morning.  They offer no complaints about the insulin.  They have discontinued the glimepiride, pioglitazone.    She has had 2 falls in the last week.  Both occurred with minor trauma.   contusion to her lip,  the other to her left shoulder.    How often are you checking your blood sugar? A few times a week  What time of day are you checking your blood sugars (select all that apply)?  Before meals  Have you had any blood sugars above 200?  No  Have you had any blood sugars below 70?  No    What symptoms do you notice when your blood sugar is low?  Dizzy and falling    What concerns do you have today about your diabetes? Other: dizzy  And falling     Do you have any of these symptoms? (Select all that apply)  No numbness or tingling in feet.  No redness, sores or blisters on feet.  No complaints of excessive thirst.  No reports of blurry vision.  No significant changes to weight.      BP Readings from Last 2 Encounters:   10/08/20 124/66   07/13/20 122/62     Hemoglobin A1C (%)   Date Value   10/08/2020 8.7 (H)   07/10/2020 8.5 (H)     LDL Cholesterol Calculated (mg/dL)   Date Value   05/15/2020 40   06/20/2019 65               Review of Systems   Constitutional: Negative.    HENT: Negative.    Respiratory: Negative.    Cardiovascular: Negative.    Gastrointestinal: Negative.    Genitourinary: Negative for dysuria and frequency.   Musculoskeletal: Negative.    Skin: Negative.    Neurological: Positive for dizziness and weakness. Negative for light-headedness, numbness, headaches and paresthesias.            Objective    /66   Pulse 72   Temp 98.4  F (36.9  C) (Temporal)   Ht 1.549 m (5' 1\")  "  Wt 52.4 kg (115 lb 8 oz)   SpO2 96%   BMI 21.82 kg/m    Body mass index is 21.82 kg/m .  Physical Exam   GENERAL APPEARANCE: alert and no distress  EYES: PERRL  HENT: normal cephalic.  There is some bruising upper left side of lip.   NECK: no adenopathy, no asymmetry, masses, or scars and thyroid normal to palpation  RESP: lungs clear to auscultation - no rales, rhonchi or wheezes  CV: regular rates and rhythm  MS: extremities normal- no gross deformities noted  SKIN: Bruising left shoulder  NEURO: Gait without walker is slow but steady and stable.    Results for orders placed or performed in visit on 10/08/20 (from the past 24 hour(s))   Hemoglobin A1c   Result Value Ref Range    Hemoglobin A1C 8.7 (H) 0 - 5.6 %   CBC with platelets   Result Value Ref Range    WBC 8.0 4.0 - 11.0 10e9/L    RBC Count 4.50 3.8 - 5.2 10e12/L    Hemoglobin 14.1 11.7 - 15.7 g/dL    Hematocrit 41.9 35.0 - 47.0 %    MCV 93 78 - 100 fl    MCH 31.3 26.5 - 33.0 pg    MCHC 33.7 31.5 - 36.5 g/dL    RDW 13.5 10.0 - 15.0 %    Platelet Count 247 150 - 450 10e9/L           Assessment & Plan     Type 2 diabetes mellitus with microalbuminuria, without long-term current use of insulin (H)  A1c is not improved at all.  Think I need to review the home blood close readings and titrate the insulin upwards.  We will get these numbers from her   - Hemoglobin A1c    Falls frequently  Muscle weakness (generalized)  Await chemistries relatively contributing causes.  Nothing seems obvious.  - [facility], HAND, AND CHIROPRACTIC REFERRAL; Future    Need for vaccination  - CO FLU VACCINE, INCREASED ANTIGEN, PRESV FREE  - ADMIN 1st VACCINE      As above work-up for falls  - Comprehensive metabolic panel (BMP + Alb, Alk Phos, ALT, AST, Total. Bili, TP)  - CBC with platelets    HTN (hypertension), benign  Continue current meds     Autoimmune hypothyroidism  continue current meds            See Patient Instructions    Return in about 3 months (around 1/8/2021)  for Diabetes Recheck.    Braxton Boone MD  [facility]

## 2020-10-09 DIAGNOSIS — G47.00 INSOMNIA, UNSPECIFIED TYPE: ICD-10-CM

## 2020-10-09 NOTE — TELEPHONE ENCOUNTER
Spoke with pt's . He was the one calling [facility] yesterday for patient. Patient's spouse says Dr. Boone referred them to [facility], hand therapy, and [facility]. He called [facility] and they said their clinic does not deal with fall issues. Then he called [facility] and they gave him locations in network, one of which was [facility]. That is closer to his house and wondering if patient can go there? [facility] would need referral. He reported  that one time patient fell on face and hurt lips and another time fell and hurt shoulder, all of which he says was discussed with Dr. Boone yesterday. Please advise.

## 2020-10-12 ENCOUNTER — NURSE TRIAGE (OUTPATIENT)
Dept: INTERNAL MEDICINE | Facility: CLINIC | Age: 75
End: 2020-10-12

## 2020-10-12 DIAGNOSIS — Z79.4 TYPE 2 DIABETES MELLITUS WITH MICROALBUMINURIA, WITH LONG-TERM CURRENT USE OF INSULIN (H): ICD-10-CM

## 2020-10-12 DIAGNOSIS — R80.9 TYPE 2 DIABETES MELLITUS WITH MICROALBUMINURIA, WITHOUT LONG-TERM CURRENT USE OF INSULIN (H): ICD-10-CM

## 2020-10-12 DIAGNOSIS — R80.9 TYPE 2 DIABETES MELLITUS WITH MICROALBUMINURIA, WITH LONG-TERM CURRENT USE OF INSULIN (H): ICD-10-CM

## 2020-10-12 DIAGNOSIS — E83.52 HYPERCALCEMIA: Primary | ICD-10-CM

## 2020-10-12 DIAGNOSIS — R79.89 ELEVATED LFTS: ICD-10-CM

## 2020-10-12 DIAGNOSIS — E11.29 TYPE 2 DIABETES MELLITUS WITH MICROALBUMINURIA, WITHOUT LONG-TERM CURRENT USE OF INSULIN (H): ICD-10-CM

## 2020-10-12 DIAGNOSIS — E11.29 TYPE 2 DIABETES MELLITUS WITH MICROALBUMINURIA, WITH LONG-TERM CURRENT USE OF INSULIN (H): ICD-10-CM

## 2020-10-12 RX ORDER — TRAZODONE HYDROCHLORIDE 100 MG/1
TABLET ORAL
Qty: 90 TABLET | Refills: 0 | Status: SHIPPED | OUTPATIENT
Start: 2020-10-12 | End: 2021-01-04

## 2020-10-12 RX ORDER — METFORMIN HCL 500 MG
500 TABLET, EXTENDED RELEASE 24 HR ORAL
Qty: 90 TABLET | Refills: 1 | Status: SHIPPED | OUTPATIENT
Start: 2020-10-12 | End: 2021-04-27

## 2020-10-12 NOTE — TELEPHONE ENCOUNTER
Increase glargine to 14 units  Ok to go to full tablet of metformin daily (500 mg) - will change this to once a daily type (XR)  Watch simple carbs in meals  Her Ca+ is elevated- please return to have this rechecked. Can cause weakness amongst other symptoms     Estimated Creatinine Clearance: 36.3 mL/min (A) (based on SCr of 1.09 mg/dL (H)).

## 2020-10-12 NOTE — TELEPHONE ENCOUNTER
"Dr. Boone,     Pt was just seen by Dr. Boone last Thursday, OV notes: \"Think I need to review the home blood close readings and titrate the insulin upwards.  We will get these numbers from her .\"     calling, pt checks her blood sugar once/day: before dinner every other day, and then before breakfast every other day.  He says that the last 8 out of 12 readings before dinner, were over 200. (the others were under 200).  In the morning before breakfast numbers range from 130-180.     Last night blood sugar was 258.  And Sat morning blood sugar was 134.    She currently takes:   Lantus 10 units.   And metformin 250mg.    Also, he says that she gets dizzy, and has had 2 falls from the dizziness.    Please advise on any rec's for pt.        Answer Assessment - Initial Assessment Questions  1. BLOOD GLUCOSE: \"What is your blood glucose level?\"       The past 8 out of 12 readings were 200.       4 out     6. INSULIN: \"Do you take insulin?\" \"What type of insulin(s) do you use? What is the mode of delivery? (syringe, pen (e.g., injection or  pump)?\"       Insulin Lantus 10 units daily. Metformin BID.     7. DIABETES PILLS: \"Do you take any pills for your diabetes?\" If yes, ask: \"Have you missed taking any pills recently?\"      No    8. OTHER SYMPTOMS: \"Do you have any symptoms?\" (e.g., fever, frequent urination, difficulty breathing, dizziness, weakness, vomiting)      Dizziness- comes and goes. started last week. which caused her to fall. Dizziness only when she changes position. & weakness after sitting for awhile or walking for awhile.   When laying in bed and turns head to right side she feels dizzy.    Protocols used: DIABETES - HIGH BLOOD SUGAR-A-OH      "

## 2020-10-12 NOTE — TELEPHONE ENCOUNTER
Pt's  (An) advised, stated understanding, and agreed to plan of care.  Lab appt scheduled for Oct. 16th.

## 2020-10-15 ENCOUNTER — APPOINTMENT (OUTPATIENT)
Dept: INTERPRETER SERVICES | Facility: CLINIC | Age: 75
End: 2020-10-15
Payer: COMMERCIAL

## 2020-10-16 DIAGNOSIS — R79.89 ELEVATED LFTS: ICD-10-CM

## 2020-10-16 DIAGNOSIS — E11.29 TYPE 2 DIABETES MELLITUS WITH MICROALBUMINURIA, WITH LONG-TERM CURRENT USE OF INSULIN (H): ICD-10-CM

## 2020-10-16 DIAGNOSIS — Z79.4 TYPE 2 DIABETES MELLITUS WITH MICROALBUMINURIA, WITH LONG-TERM CURRENT USE OF INSULIN (H): ICD-10-CM

## 2020-10-16 DIAGNOSIS — R80.9 TYPE 2 DIABETES MELLITUS WITH MICROALBUMINURIA, WITH LONG-TERM CURRENT USE OF INSULIN (H): ICD-10-CM

## 2020-10-16 DIAGNOSIS — E83.52 HYPERCALCEMIA: ICD-10-CM

## 2020-10-16 LAB
ALBUMIN SERPL-MCNC: 3.6 G/DL (ref 3.4–5)
ALP SERPL-CCNC: 84 U/L (ref 40–150)
ALT SERPL W P-5'-P-CCNC: 76 U/L (ref 0–50)
ANION GAP SERPL CALCULATED.3IONS-SCNC: 7 MMOL/L (ref 3–14)
AST SERPL W P-5'-P-CCNC: 52 U/L (ref 0–45)
BILIRUB DIRECT SERPL-MCNC: 0.3 MG/DL (ref 0–0.2)
BILIRUB SERPL-MCNC: 1.1 MG/DL (ref 0.2–1.3)
BUN SERPL-MCNC: 19 MG/DL (ref 7–30)
CALCIUM SERPL-MCNC: 11.1 MG/DL (ref 8.5–10.1)
CHLORIDE SERPL-SCNC: 106 MMOL/L (ref 94–109)
CO2 SERPL-SCNC: 26 MMOL/L (ref 20–32)
CREAT SERPL-MCNC: 1.03 MG/DL (ref 0.52–1.04)
GFR SERPL CREATININE-BSD FRML MDRD: 53 ML/MIN/{1.73_M2}
GLUCOSE SERPL-MCNC: 159 MG/DL (ref 70–99)
HBA1C MFR BLD: 8.8 % (ref 0–5.6)
POTASSIUM SERPL-SCNC: 3.5 MMOL/L (ref 3.4–5.3)
PROT SERPL-MCNC: 8.2 G/DL (ref 6.8–8.8)
PTH-INTACT SERPL-MCNC: 75 PG/ML (ref 18–80)
SODIUM SERPL-SCNC: 139 MMOL/L (ref 133–144)

## 2020-10-16 PROCEDURE — 83036 HEMOGLOBIN GLYCOSYLATED A1C: CPT | Performed by: INTERNAL MEDICINE

## 2020-10-16 PROCEDURE — 83970 ASSAY OF PARATHORMONE: CPT | Performed by: INTERNAL MEDICINE

## 2020-10-16 PROCEDURE — 80076 HEPATIC FUNCTION PANEL: CPT | Performed by: INTERNAL MEDICINE

## 2020-10-16 PROCEDURE — 36415 COLL VENOUS BLD VENIPUNCTURE: CPT | Performed by: INTERNAL MEDICINE

## 2020-10-16 PROCEDURE — 80048 BASIC METABOLIC PNL TOTAL CA: CPT | Performed by: INTERNAL MEDICINE

## 2020-10-23 ENCOUNTER — HOSPITAL ENCOUNTER (OUTPATIENT)
Dept: PHYSICAL THERAPY | Facility: CLINIC | Age: 75
Setting detail: THERAPIES SERIES
End: 2020-10-23
Attending: INTERNAL MEDICINE
Payer: COMMERCIAL

## 2020-10-23 DIAGNOSIS — R29.6 FALLS FREQUENTLY: ICD-10-CM

## 2020-10-23 PROCEDURE — 97161 PT EVAL LOW COMPLEX 20 MIN: CPT | Mod: GP | Performed by: PHYSICAL THERAPIST

## 2020-10-23 PROCEDURE — 97110 THERAPEUTIC EXERCISES: CPT | Mod: GP | Performed by: PHYSICAL THERAPIST

## 2020-10-23 NOTE — PROGRESS NOTES
[facility]        OUTPATIENT PHYSICAL THERAPY FUNCTIONAL EVALUATION  PLAN OF TREATMENT FOR OUTPATIENT REHABILITATION  (COMPLETE FOR INITIAL CLAIMS ONLY)  Patient's Last Name, First Name, M.I.  YOB: 1945  Gregoria Gan     Provider's Name   [facility]   Medical Record No.  [id number]     Start of Care Date:  10/23/20   Onset Date:  10/09/20   Type:     _X__PT   ____OT  ____SLP Medical Diagnosis:  Frequent falling     PT Diagnosis:  impaired balance and generalized weakness Visits from SOC:  1                              __________________________________________________________________________________  Plan of Treatment/Functional Goals:  strengthening, balance training, gait training, neuromuscular re-education  Sit to stands, forward step-ups w/ 1 UE support, alternating tapping on 6 in step, standing abduction with UE support each side. Walking with head turns verticle and horizontal        GOALS  HEP  The pt will demonstrate safety with HEP allowing her to improve her strength, balance and reduce her fall risk independently  10/23/20 MET                                                                                 Therapy Frequency:  1 time/week   Predicted Duration of Therapy Intervention:  1 visit    Roxy العراقي, PT                                    I CERTIFY THE NEED FOR THESE SERVICES FURNISHED UNDER        THIS PLAN OF TREATMENT AND WHILE UNDER MY CARE     (Physician co-signature of this document indicates review and certification of the therapy plan).                Certification Date From:  10/23/20   Certification Date To:  10/23/20    Referring Provider:  Dr. Boone    Initial Assessment  See Epic Evaluation- Start of Care Date: 10/23/20

## 2020-11-05 DIAGNOSIS — E78.5 HYPERLIPIDEMIA LDL GOAL <100: ICD-10-CM

## 2020-11-05 RX ORDER — ATORVASTATIN CALCIUM 20 MG/1
20 TABLET, FILM COATED ORAL DAILY
Qty: 90 TABLET | Refills: 2 | Status: SHIPPED | OUTPATIENT
Start: 2020-11-05 | End: 2021-07-02

## 2020-11-09 DIAGNOSIS — R80.9 TYPE 2 DIABETES MELLITUS WITH MICROALBUMINURIA, WITH LONG-TERM CURRENT USE OF INSULIN (H): ICD-10-CM

## 2020-11-09 DIAGNOSIS — E11.29 TYPE 2 DIABETES MELLITUS WITH MICROALBUMINURIA, WITH LONG-TERM CURRENT USE OF INSULIN (H): ICD-10-CM

## 2020-11-09 DIAGNOSIS — Z79.4 TYPE 2 DIABETES MELLITUS WITH MICROALBUMINURIA, WITH LONG-TERM CURRENT USE OF INSULIN (H): ICD-10-CM

## 2020-11-10 RX ORDER — INSULIN GLARGINE 100 [IU]/ML
INJECTION, SOLUTION SUBCUTANEOUS
Qty: 15 ML | Refills: 1 | Status: SHIPPED | OUTPATIENT
Start: 2020-11-10 | End: 2021-03-12

## 2020-11-13 DIAGNOSIS — E83.52 HYPERCALCEMIA: Primary | ICD-10-CM

## 2020-12-03 NOTE — TELEPHONE ENCOUNTER
Patient called back and stated she does not need it at this time.    Ashley HAUSER  Central Scheduler

## 2020-12-03 NOTE — TELEPHONE ENCOUNTER
Diabetes Education Scheduling Outreach #2:    Call to patient to schedule. Left message with phone number to call to schedule.    Ashley Dawson  [place] [facility]  Diabetes and Nutrition Scheduling

## 2020-12-11 DIAGNOSIS — E06.3 AUTOIMMUNE HYPOTHYROIDISM: ICD-10-CM

## 2020-12-14 RX ORDER — LEVOTHYROXINE SODIUM 100 UG/1
100 TABLET ORAL DAILY
Qty: 90 TABLET | Refills: 1 | Status: SHIPPED | OUTPATIENT
Start: 2020-12-14 | End: 2021-03-10

## 2021-01-03 DIAGNOSIS — I10 HTN (HYPERTENSION), BENIGN: ICD-10-CM

## 2021-01-03 DIAGNOSIS — G47.00 INSOMNIA, UNSPECIFIED TYPE: ICD-10-CM

## 2021-01-04 RX ORDER — TRAZODONE HYDROCHLORIDE 100 MG/1
TABLET ORAL
Qty: 90 TABLET | Refills: 0 | Status: SHIPPED | OUTPATIENT
Start: 2021-01-04 | End: 2021-04-27

## 2021-01-04 RX ORDER — AMLODIPINE BESYLATE 10 MG/1
10 TABLET ORAL DAILY
Qty: 90 TABLET | Refills: 0 | Status: SHIPPED | OUTPATIENT
Start: 2021-01-04 | End: 2021-04-27

## 2021-01-04 RX ORDER — LOSARTAN POTASSIUM 50 MG/1
TABLET ORAL
Qty: 90 TABLET | Refills: 0 | Status: SHIPPED | OUTPATIENT
Start: 2021-01-04 | End: 2021-04-27

## 2021-02-24 ENCOUNTER — OFFICE VISIT (OUTPATIENT)
Dept: INTERNAL MEDICINE | Facility: CLINIC | Age: 76
End: 2021-02-24
Payer: COMMERCIAL

## 2021-02-24 VITALS
TEMPERATURE: 98.8 F | SYSTOLIC BLOOD PRESSURE: 122 MMHG | WEIGHT: 109.74 LBS | HEIGHT: 61 IN | HEART RATE: 69 BPM | OXYGEN SATURATION: 96 % | DIASTOLIC BLOOD PRESSURE: 56 MMHG | BODY MASS INDEX: 20.72 KG/M2

## 2021-02-24 DIAGNOSIS — I12.9 BENIGN HYPERTENSION WITH CKD (CHRONIC KIDNEY DISEASE) STAGE III (H): ICD-10-CM

## 2021-02-24 DIAGNOSIS — R80.9 TYPE 2 DIABETES MELLITUS WITH MICROALBUMINURIA, WITH LONG-TERM CURRENT USE OF INSULIN (H): Primary | ICD-10-CM

## 2021-02-24 DIAGNOSIS — Z79.4 TYPE 2 DIABETES MELLITUS WITH MICROALBUMINURIA, WITH LONG-TERM CURRENT USE OF INSULIN (H): Primary | ICD-10-CM

## 2021-02-24 DIAGNOSIS — E83.52 HYPERCALCEMIA: ICD-10-CM

## 2021-02-24 DIAGNOSIS — N18.30 BENIGN HYPERTENSION WITH CKD (CHRONIC KIDNEY DISEASE) STAGE III (H): ICD-10-CM

## 2021-02-24 DIAGNOSIS — R01.1 SYSTOLIC MURMUR: ICD-10-CM

## 2021-02-24 DIAGNOSIS — E06.3 AUTOIMMUNE HYPOTHYROIDISM: ICD-10-CM

## 2021-02-24 DIAGNOSIS — E11.29 TYPE 2 DIABETES MELLITUS WITH MICROALBUMINURIA, WITH LONG-TERM CURRENT USE OF INSULIN (H): Primary | ICD-10-CM

## 2021-02-24 LAB
ANION GAP SERPL CALCULATED.3IONS-SCNC: 4 MMOL/L (ref 3–14)
BUN SERPL-MCNC: 19 MG/DL (ref 7–30)
CALCIUM SERPL-MCNC: 11.1 MG/DL (ref 8.5–10.1)
CHLORIDE SERPL-SCNC: 107 MMOL/L (ref 94–109)
CHOLEST SERPL-MCNC: 152 MG/DL
CO2 SERPL-SCNC: 28 MMOL/L (ref 20–32)
CREAT SERPL-MCNC: 1.04 MG/DL (ref 0.52–1.04)
CREAT UR-MCNC: 400 MG/DL
DEPRECATED CALCIDIOL+CALCIFEROL SERPL-MC: 63 UG/L (ref 20–75)
GFR SERPL CREATININE-BSD FRML MDRD: 52 ML/MIN/{1.73_M2}
GLUCOSE SERPL-MCNC: 149 MG/DL (ref 70–99)
HBA1C MFR BLD: 8.3 % (ref 0–5.6)
HDLC SERPL-MCNC: 52 MG/DL
HGB BLD-MCNC: 13.8 G/DL (ref 11.7–15.7)
LDLC SERPL CALC-MCNC: 53 MG/DL
MICROALBUMIN UR-MCNC: 25 MG/L
MICROALBUMIN/CREAT UR: 6.32 MG/G CR (ref 0–25)
NONHDLC SERPL-MCNC: 100 MG/DL
POTASSIUM SERPL-SCNC: 4.1 MMOL/L (ref 3.4–5.3)
SODIUM SERPL-SCNC: 139 MMOL/L (ref 133–144)
T4 FREE SERPL-MCNC: 1.85 NG/DL (ref 0.76–1.46)
TRIGL SERPL-MCNC: 233 MG/DL
TSH SERPL DL<=0.005 MIU/L-ACNC: 0.22 MU/L (ref 0.4–4)

## 2021-02-24 PROCEDURE — 84439 ASSAY OF FREE THYROXINE: CPT | Performed by: INTERNAL MEDICINE

## 2021-02-24 PROCEDURE — 85018 HEMOGLOBIN: CPT | Performed by: INTERNAL MEDICINE

## 2021-02-24 PROCEDURE — 82043 UR ALBUMIN QUANTITATIVE: CPT | Performed by: INTERNAL MEDICINE

## 2021-02-24 PROCEDURE — 36415 COLL VENOUS BLD VENIPUNCTURE: CPT | Performed by: INTERNAL MEDICINE

## 2021-02-24 PROCEDURE — 80061 LIPID PANEL: CPT | Performed by: INTERNAL MEDICINE

## 2021-02-24 PROCEDURE — 99214 OFFICE O/P EST MOD 30 MIN: CPT | Performed by: INTERNAL MEDICINE

## 2021-02-24 PROCEDURE — 80048 BASIC METABOLIC PNL TOTAL CA: CPT | Performed by: INTERNAL MEDICINE

## 2021-02-24 PROCEDURE — 84443 ASSAY THYROID STIM HORMONE: CPT | Performed by: INTERNAL MEDICINE

## 2021-02-24 PROCEDURE — 82306 VITAMIN D 25 HYDROXY: CPT | Performed by: INTERNAL MEDICINE

## 2021-02-24 PROCEDURE — 83036 HEMOGLOBIN GLYCOSYLATED A1C: CPT | Performed by: INTERNAL MEDICINE

## 2021-02-24 ASSESSMENT — MIFFLIN-ST. JEOR: SCORE: 920.16

## 2021-02-24 NOTE — PATIENT INSTRUCTIONS
The following are resources for the COVID vaccines:    [facility] COVID Vaccine location finder:    Https://vaccineconnector.[place].gov/    [facility] resources:    [email or web address]    [email or web address]

## 2021-02-24 NOTE — NURSING NOTE
"Chief Complaint   Patient presents with     Diabetes     /56   Pulse 69   Temp 98.8  F (37.1  C) (Temporal)   Ht 1.549 m (5' 1\")   Wt 49.8 kg (109 lb 11.8 oz)   SpO2 96%   BMI 20.74 kg/m   Estimated body mass index is 20.74 kg/m  as calculated from the following:    Height as of this encounter: 1.549 m (5' 1\").    Weight as of this encounter: 49.8 kg (109 lb 11.8 oz).            Marisela Phillips CMA  "

## 2021-02-24 NOTE — PROGRESS NOTES
Assessment & Plan     Type 2 diabetes mellitus with microalbuminuria, with long-term current use of insulin (H)  Poorly controlled  Increase lantus to 15 units.   - Basic metabolic panel  - Hemoglobin A1c  - Albumin Random Urine Quantitative with Creat Ratio  - Lipid panel reflex to direct LDL Fasting  - T4 free    Benign hypertension with CKD (chronic kidney disease) stage III  Cont meds    Systolic murmur  Monitor- consider echo   - Hemoglobin    Autoimmune hypothyroidism  Reduce dose to 88 mcg daily  - TSH with free T4 reflex  - levothyroxine (SYNTHROID/LEVOTHROID) 88 MCG tablet; Take 1 tablet (88 mcg) by mouth daily    Review of the result(s) of each unique test - 3+             Return in about 3 months (around 5/24/2021) for Diabetes Check.    Braxton Boone MD  [facility]   Gregoria Coombs is a 76 year old who presents for the following health issues     HPI       Diabetes Follow-up    How often are you checking your blood sugar? A few times a week  What time of day are you checking your blood sugars (select all that apply)?  Before meals  Have you had any blood sugars above 200?  Yes frequently before dinner  Have you had any blood sugars below 70?  No    What symptoms do you notice when your blood sugar is low?  None    What concerns do you have today about your diabetes? None and Blood sugar is often over 200     Do you have any of these symptoms? (Select all that apply)  No numbness or tingling in feet.  No redness, sores or blisters on feet.  No complaints of excessive thirst.  No reports of blurry vision.  No significant changes to weight.    Have you had a diabetic eye exam in the last 12 months? No Due this month        BP Readings from Last 2 Encounters:   02/24/21 122/56   10/08/20 124/66     Hemoglobin A1C (%)   Date Value   10/16/2020 8.8 (H)   10/08/2020 8.7 (H)     LDL Cholesterol Calculated (mg/dL)   Date Value   05/15/2020 40   06/20/2019 65  "                  Review of Systems   Constitutional, HEENT, cardiovascular, pulmonary, gi and gu systems are negative, except as otherwise noted.      Objective    /56   Pulse 69   Temp 98.8  F (37.1  C) (Temporal)   Ht 1.549 m (5' 1\")   Wt 49.8 kg (109 lb 11.8 oz)   SpO2 96%   BMI 20.74 kg/m    Body mass index is 20.74 kg/m .  Physical Exam   GENERAL APPEARANCE: alert and no distress  RESP: lungs clear to auscultation - no rales, rhonchi or wheezes  CV: regular rates and rhythm and grade 3/6 systolic ejection murmur heard best over the RSB  DIABETIC FOOT EXAM: normal DP and PT pulses, no trophic changes or ulcerative lesions, normal sensory exam and normal monofilament exam    Results for orders placed or performed in visit on 02/24/21   Basic metabolic panel     Status: Abnormal   Result Value Ref Range    Sodium 139 133 - 144 mmol/L    Potassium 4.1 3.4 - 5.3 mmol/L    Chloride 107 94 - 109 mmol/L    Carbon Dioxide 28 20 - 32 mmol/L    Anion Gap 4 3 - 14 mmol/L    Glucose 149 (H) 70 - 99 mg/dL    Urea Nitrogen 19 7 - 30 mg/dL    Creatinine 1.04 0.52 - 1.04 mg/dL    GFR Estimate 52 (L) >60 mL/min/[1.73_m2]    GFR Estimate If Black 60 (L) >60 mL/min/[1.73_m2]    Calcium 11.1 (H) 8.5 - 10.1 mg/dL   Hemoglobin     Status: None   Result Value Ref Range    Hemoglobin 13.8 11.7 - 15.7 g/dL   Hemoglobin A1c     Status: Abnormal   Result Value Ref Range    Hemoglobin A1C 8.3 (H) 0 - 5.6 %   Albumin Random Urine Quantitative with Creat Ratio     Status: None   Result Value Ref Range    Creatinine Urine 400 mg/dL    Albumin Urine mg/L 25 mg/L    Albumin Urine mg/g Cr 6.32 0 - 25 mg/g Cr   Lipid panel reflex to direct LDL Fasting     Status: Abnormal   Result Value Ref Range    Cholesterol 152 <200 mg/dL    Triglycerides 233 (H) <150 mg/dL    HDL Cholesterol 52 >49 mg/dL    LDL Cholesterol Calculated 53 <100 mg/dL    Non HDL Cholesterol 100 <130 mg/dL   TSH with free T4 reflex     Status: Abnormal   Result Value " Ref Range    TSH 0.22 (L) 0.40 - 4.00 mU/L   Vitamin D Deficiency     Status: None   Result Value Ref Range    Vitamin D Deficiency screening 63 20 - 75 ug/L   T4 free     Status: Abnormal   Result Value Ref Range    T4 Free 1.85 (H) 0.76 - 1.46 ng/dL

## 2021-03-10 RX ORDER — LEVOTHYROXINE SODIUM 88 UG/1
88 TABLET ORAL DAILY
Qty: 90 TABLET | Refills: 3 | Status: SHIPPED | OUTPATIENT
Start: 2021-03-10 | End: 2021-09-10

## 2021-03-11 ENCOUNTER — TELEPHONE (OUTPATIENT)
Dept: INTERNAL MEDICINE | Facility: CLINIC | Age: 76
End: 2021-03-11

## 2021-03-11 NOTE — TELEPHONE ENCOUNTER
Reason for Call:  Request for results:    Name of test or procedure: lab tests    Date of test of procedure: 2-24-21    Location of the test or procedure: [facility] to leave the result message on voice mail or with a family member? YES    Phone number Patient can be reached at:  Home number on file [phone] (home)    Additional comments: please call with results    Call taken on 3/11/2021 at 10:56 AM by Maryuri Whitehead

## 2021-03-12 ENCOUNTER — TELEPHONE (OUTPATIENT)
Dept: INTERNAL MEDICINE | Facility: CLINIC | Age: 76
End: 2021-03-12

## 2021-03-12 ENCOUNTER — APPOINTMENT (OUTPATIENT)
Dept: INTERPRETER SERVICES | Facility: CLINIC | Age: 76
End: 2021-03-12
Payer: COMMERCIAL

## 2021-03-12 NOTE — TELEPHONE ENCOUNTER
Patient  called back(CTC in chart); Offered to call back with Pashto , patient declined. Relayed message regarding change to thyroid medication and lantus. Pt  verbalized understanding. Will schedule follow up lab appointment in 3 months.   Alycia Muro RN

## 2021-03-12 NOTE — TELEPHONE ENCOUNTER
LM via  for pt to return call to clinic, please relay lab results below upon return call:    New dose of thyroid sent in - 88 mcg. Change to this. Repeat tsh 3 mo   a1c still really high- increase lantus to 15 units daily from 10.  Repeat 3 mo    .Marisela Phillips, CMA

## 2021-04-27 DIAGNOSIS — I10 HTN (HYPERTENSION), BENIGN: ICD-10-CM

## 2021-04-27 DIAGNOSIS — R80.9 TYPE 2 DIABETES MELLITUS WITH MICROALBUMINURIA, WITHOUT LONG-TERM CURRENT USE OF INSULIN (H): ICD-10-CM

## 2021-04-27 DIAGNOSIS — G47.00 INSOMNIA, UNSPECIFIED TYPE: ICD-10-CM

## 2021-04-27 DIAGNOSIS — E11.29 TYPE 2 DIABETES MELLITUS WITH MICROALBUMINURIA, WITHOUT LONG-TERM CURRENT USE OF INSULIN (H): ICD-10-CM

## 2021-04-27 RX ORDER — AMLODIPINE BESYLATE 10 MG/1
10 TABLET ORAL DAILY
Qty: 90 TABLET | Refills: 0 | Status: SHIPPED | OUTPATIENT
Start: 2021-04-27 | End: 2021-07-02

## 2021-04-27 RX ORDER — LOSARTAN POTASSIUM 50 MG/1
TABLET ORAL
Qty: 90 TABLET | Refills: 0 | Status: SHIPPED | OUTPATIENT
Start: 2021-04-27 | End: 2021-07-02

## 2021-04-27 RX ORDER — METFORMIN HCL 500 MG
TABLET, EXTENDED RELEASE 24 HR ORAL
Qty: 90 TABLET | Refills: 0 | Status: SHIPPED | OUTPATIENT
Start: 2021-04-27 | End: 2021-07-02

## 2021-04-27 RX ORDER — TRAZODONE HYDROCHLORIDE 100 MG/1
TABLET ORAL
Qty: 90 TABLET | Refills: 0 | Status: SHIPPED | OUTPATIENT
Start: 2021-04-27 | End: 2021-07-27

## 2021-05-28 DIAGNOSIS — E11.29 TYPE 2 DIABETES MELLITUS WITH MICROALBUMINURIA, WITHOUT LONG-TERM CURRENT USE OF INSULIN (H): ICD-10-CM

## 2021-05-28 DIAGNOSIS — R80.9 TYPE 2 DIABETES MELLITUS WITH MICROALBUMINURIA, WITHOUT LONG-TERM CURRENT USE OF INSULIN (H): ICD-10-CM

## 2021-05-28 LAB — HBA1C MFR BLD: 8.5 % (ref 0–5.6)

## 2021-05-28 PROCEDURE — 36415 COLL VENOUS BLD VENIPUNCTURE: CPT | Performed by: INTERNAL MEDICINE

## 2021-05-28 PROCEDURE — 83036 HEMOGLOBIN GLYCOSYLATED A1C: CPT | Performed by: INTERNAL MEDICINE

## 2021-06-04 ENCOUNTER — OFFICE VISIT (OUTPATIENT)
Dept: INTERNAL MEDICINE | Facility: CLINIC | Age: 76
End: 2021-06-04
Payer: COMMERCIAL

## 2021-06-04 VITALS
WEIGHT: 111.8 LBS | HEART RATE: 68 BPM | DIASTOLIC BLOOD PRESSURE: 56 MMHG | RESPIRATION RATE: 16 BRPM | HEIGHT: 61 IN | BODY MASS INDEX: 21.11 KG/M2 | SYSTOLIC BLOOD PRESSURE: 126 MMHG | TEMPERATURE: 99.1 F

## 2021-06-04 DIAGNOSIS — R80.9 TYPE 2 DIABETES MELLITUS WITH MICROALBUMINURIA, WITHOUT LONG-TERM CURRENT USE OF INSULIN (H): Primary | ICD-10-CM

## 2021-06-04 DIAGNOSIS — E11.29 TYPE 2 DIABETES MELLITUS WITH MICROALBUMINURIA, WITHOUT LONG-TERM CURRENT USE OF INSULIN (H): Primary | ICD-10-CM

## 2021-06-04 DIAGNOSIS — E06.3 AUTOIMMUNE HYPOTHYROIDISM: ICD-10-CM

## 2021-06-04 PROCEDURE — 99214 OFFICE O/P EST MOD 30 MIN: CPT | Performed by: INTERNAL MEDICINE

## 2021-06-04 RX ORDER — GLIMEPIRIDE 1 MG/1
1 TABLET ORAL
Qty: 30 TABLET | Refills: 1 | Status: SHIPPED | OUTPATIENT
Start: 2021-06-04 | End: 2021-07-02

## 2021-06-04 ASSESSMENT — MIFFLIN-ST. JEOR: SCORE: 929.5

## 2021-06-04 NOTE — PROGRESS NOTES
"    Assessment & Plan     Type 2 diabetes mellitus with microalbuminuria, without long-term current use of insulin (H)  - Hemoglobin A1c; Future  - glimepiride (AMARYL) 1 MG tablet; Take 1 tablet (1 mg) by mouth every morning (before breakfast)    Autoimmune hypothyroidism  - TSH with free T4 reflex; Future    Review of the result(s) of each unique test - DM labs  Ordering of each unique test  Prescription drug management         See Patient Instructions    Return in about 4 weeks (around 7/2/2021) for Diabetes Check.    Braxton Boone MD  [facility]    Citlaly Coombs is a 76 year old who presents for the following health issues     HPI     Diabetes Follow-up    How often are you checking your blood sugar? A few times a week  What time of day are you checking your blood sugars (select all that apply)?  Before meals  Have you had any blood sugars above 200?  Yes   Have you had any blood sugars below 70?  No    What symptoms do you notice when your blood sugar is low?  Shaky    What concerns do you have today about your diabetes? None     Do you have any of these symptoms? (Select all that apply)  No numbness or tingling in feet.  No redness, sores or blisters on feet.  No complaints of excessive thirst.  No reports of blurry vision.  No significant changes to weight.    Have you had a diabetic eye exam in the last 12 months? No        BP Readings from Last 2 Encounters:   06/04/21 126/56   02/24/21 122/56     Hemoglobin A1C (%)   Date Value   05/28/2021 8.5 (H)   02/24/2021 8.3 (H)     LDL Cholesterol Calculated (mg/dL)   Date Value   02/24/2021 53   05/15/2020 40                     Review of Systems   Constitutional, HEENT, cardiovascular, pulmonary, gi and gu systems are negative, except as otherwise noted.      Objective    /56   Pulse 68   Temp 99.1  F (37.3  C) (Temporal)   Resp 16   Ht 1.549 m (5' 1\")   Wt 50.7 kg (111 lb 12.8 oz)   BMI 21.12 kg/m    Body " mass index is 21.12 kg/m .  Physical Exam   GENERAL APPEARANCE: alert and no distress  RESP: lungs clear to auscultation - no rales, rhonchi or wheezes  CV: regular rates and rhythm, normal S1 S2, no S3 or S4 and no murmur, click or rub  DIABETIC FOOT EXAM: normal DP and PT pulses, no trophic changes or ulcerative lesions and normal sensory exam

## 2021-06-14 ENCOUNTER — NURSE TRIAGE (OUTPATIENT)
Dept: INTERNAL MEDICINE | Facility: CLINIC | Age: 76
End: 2021-06-14

## 2021-06-14 NOTE — TELEPHONE ENCOUNTER
"Patient's  calling to report that the patient is unable to get out of bed due to spinning so bad. Patient unable to walk. Patient also reports numbness of one finger.   states that he cannot get the patient safely to the car. Advised to call 911.  agrees with plan.      Reason for Disposition    [1] Numbness (i.e., loss of sensation) of the face, arm or leg on one side of the body AND [2] sudden onset AND [3] present now    Additional Information    Negative: [1] Weakness (i.e., paralysis, loss of muscle strength) of the face, arm or leg on one side of the body AND [2] sudden onset AND [3] present now    Answer Assessment - Initial Assessment Questions  1. DESCRIPTION: \"Describe your dizziness.\"      spinning  2. VERTIGO: \"Do you feel like either you or the room is spinning or tilting?\"       yes  3. LIGHTHEADED: \"Do you feel lightheaded?\" (e.g., somewhat faint, woozy, weak upon standing)      When sits up severe spinning.  4. SEVERITY: \"How bad is it?\"  \"Can you walk?\"    - MILD - Feels unsteady but walking normally.    - MODERATE - Feels very unsteady when walking, but not falling; interferes with normal activities (e.g., school, work) .    - SEVERE - Unable to walk without falling (requires assistance).      severe  5. ONSET:  \"When did the dizziness begin?\"      This morning  6. AGGRAVATING FACTORS: \"Does anything make it worse?\" (e.g., standing, change in head position)      Change in position  7. CAUSE: \"What do you think is causing the dizziness?\"      unknown  8. RECURRENT SYMPTOM: \"Have you had dizziness before?\" If so, ask: \"When was the last time?\" \"What happened that time?\"      Brief episodes in past that resolved on own  9. OTHER SYMPTOMS: \"Do you have any other symptoms?\" (e.g., headache, weakness, numbness, vomiting, earache)      Numbness on one finger tip.  10. PREGNANCY: \"Is there any chance you are pregnant?\" \"When was your last menstrual period?\"        NA    Protocols used: " DIZZINESS - VERTIGO-A-    Lilly Chiang RN

## 2021-06-16 DIAGNOSIS — R80.9 TYPE 2 DIABETES MELLITUS WITH MICROALBUMINURIA, WITH LONG-TERM CURRENT USE OF INSULIN (H): ICD-10-CM

## 2021-06-16 DIAGNOSIS — E11.29 TYPE 2 DIABETES MELLITUS WITH MICROALBUMINURIA, WITH LONG-TERM CURRENT USE OF INSULIN (H): ICD-10-CM

## 2021-06-16 DIAGNOSIS — Z79.4 TYPE 2 DIABETES MELLITUS WITH MICROALBUMINURIA, WITH LONG-TERM CURRENT USE OF INSULIN (H): ICD-10-CM

## 2021-07-02 ENCOUNTER — OFFICE VISIT (OUTPATIENT)
Dept: INTERNAL MEDICINE | Facility: CLINIC | Age: 76
End: 2021-07-02
Payer: COMMERCIAL

## 2021-07-02 VITALS
DIASTOLIC BLOOD PRESSURE: 60 MMHG | HEART RATE: 75 BPM | OXYGEN SATURATION: 94 % | SYSTOLIC BLOOD PRESSURE: 132 MMHG | TEMPERATURE: 99.3 F | BODY MASS INDEX: 21.16 KG/M2 | WEIGHT: 112 LBS

## 2021-07-02 DIAGNOSIS — Z79.4 TYPE 2 DIABETES MELLITUS WITH MICROALBUMINURIA, WITH LONG-TERM CURRENT USE OF INSULIN (H): ICD-10-CM

## 2021-07-02 DIAGNOSIS — N18.30 BENIGN HYPERTENSION WITH CKD (CHRONIC KIDNEY DISEASE) STAGE III (H): ICD-10-CM

## 2021-07-02 DIAGNOSIS — R80.9 TYPE 2 DIABETES MELLITUS WITH MICROALBUMINURIA, WITHOUT LONG-TERM CURRENT USE OF INSULIN (H): Primary | ICD-10-CM

## 2021-07-02 DIAGNOSIS — Z23 HIGH PRIORITY FOR 2019-NCOV VACCINE: ICD-10-CM

## 2021-07-02 DIAGNOSIS — I12.9 BENIGN HYPERTENSION WITH CKD (CHRONIC KIDNEY DISEASE) STAGE III (H): ICD-10-CM

## 2021-07-02 DIAGNOSIS — E78.5 HYPERLIPIDEMIA LDL GOAL <100: ICD-10-CM

## 2021-07-02 DIAGNOSIS — E11.29 TYPE 2 DIABETES MELLITUS WITH MICROALBUMINURIA, WITH LONG-TERM CURRENT USE OF INSULIN (H): ICD-10-CM

## 2021-07-02 DIAGNOSIS — I10 HTN (HYPERTENSION), BENIGN: ICD-10-CM

## 2021-07-02 DIAGNOSIS — E11.29 TYPE 2 DIABETES MELLITUS WITH MICROALBUMINURIA, WITHOUT LONG-TERM CURRENT USE OF INSULIN (H): Primary | ICD-10-CM

## 2021-07-02 DIAGNOSIS — R80.9 TYPE 2 DIABETES MELLITUS WITH MICROALBUMINURIA, WITH LONG-TERM CURRENT USE OF INSULIN (H): ICD-10-CM

## 2021-07-02 PROCEDURE — 91300 COVID-19,PF,PFIZER: CPT | Performed by: INTERNAL MEDICINE

## 2021-07-02 PROCEDURE — 99214 OFFICE O/P EST MOD 30 MIN: CPT | Mod: 25 | Performed by: INTERNAL MEDICINE

## 2021-07-02 PROCEDURE — 0001A COVID-19,PF,PFIZER: CPT | Performed by: INTERNAL MEDICINE

## 2021-07-02 RX ORDER — GLIMEPIRIDE 1 MG/1
1 TABLET ORAL
Qty: 90 TABLET | Refills: 1 | Status: SHIPPED | OUTPATIENT
Start: 2021-07-02 | End: 2022-01-17

## 2021-07-02 RX ORDER — LOSARTAN POTASSIUM 50 MG/1
50 TABLET ORAL DAILY
Qty: 90 TABLET | Refills: 1 | Status: SHIPPED | OUTPATIENT
Start: 2021-07-02 | End: 2022-01-31

## 2021-07-02 RX ORDER — AMLODIPINE BESYLATE 10 MG/1
10 TABLET ORAL DAILY
Qty: 90 TABLET | Refills: 1 | Status: SHIPPED | OUTPATIENT
Start: 2021-07-02 | End: 2022-03-01

## 2021-07-02 RX ORDER — ATORVASTATIN CALCIUM 20 MG/1
20 TABLET, FILM COATED ORAL DAILY
Qty: 90 TABLET | Refills: 1 | Status: SHIPPED | OUTPATIENT
Start: 2021-07-02 | End: 2022-02-18

## 2021-07-02 RX ORDER — METFORMIN HCL 500 MG
TABLET, EXTENDED RELEASE 24 HR ORAL
Qty: 90 TABLET | Refills: 1 | Status: SHIPPED | OUTPATIENT
Start: 2021-07-02 | End: 2022-01-31

## 2021-07-02 NOTE — PROGRESS NOTES
Assessment & Plan     Type 2 diabetes mellitus with microalbuminuria, without long-term current use of insulin (H)  Some improvement w/increase in insulin+ sulfonyurea.  No hypoglycemia. Cont at this dose and f/u 3 mo   Reduce glu checks to qday    Benign hypertension with CKD (chronic kidney disease) stage III  BP stable- continue meds    High priority for 2019-nCoV vaccine  - COVID-19,PF,PFIZER    Review of the result(s) of each unique test -    Prescription drug management         See Patient Instructions    Return in about 3 months (around 10/2/2021) for Diabetes Check.    Braxton Boone MD  [facility]   Gregoria Coombs is a 76 year old who presents for the following health issues  accompanied by her spouse:    HPI     Diabetes Follow-up    How often are you checking your blood sugar? Two times daily  Blood sugar testing frequency justification:  Uncontrolled diabetes  What time of day are you checking your blood sugars (select all that apply)?  Before and after meals  Have you had any blood sugars above 200?  Yes   Have you had any blood sugars below 70?  No    What symptoms do you notice when your blood sugar is low?  None    What concerns do you have today about your diabetes? None     Do you have any of these symptoms? (Select all that apply)  No numbness or tingling in feet.  No redness, sores or blisters on feet.  No complaints of excessive thirst.  No reports of blurry vision.  No significant changes to weight.    Have you had a diabetic eye exam in the last 12 months? No        BP Readings from Last 2 Encounters:   07/02/21 132/60   06/04/21 126/56     Hemoglobin A1C (%)   Date Value   05/28/2021 8.5 (H)   02/24/2021 8.3 (H)     LDL Cholesterol Calculated (mg/dL)   Date Value   02/24/2021 53   05/15/2020 40                 How many servings of fruits and vegetables do you eat daily?  0-1    On average, how many sweetened beverages do you drink each day  (Examples: soda, juice, sweet tea, etc.  Do NOT count diet or artificially sweetened beverages)?   0    How many days per week do you exercise enough to make your heart beat faster? 3 or less    How many minutes a day do you exercise enough to make your heart beat faster? 9 or less    How many days per week do you miss taking your medication? 0        Review of Systems   Constitutional, HEENT, cardiovascular, pulmonary, gi and gu systems are negative, except as otherwise noted.      Objective    /60   Pulse 75   Temp 99.3  F (37.4  C) (Temporal)   Wt 50.8 kg (112 lb)   SpO2 94%   BMI 21.16 kg/m    Body mass index is 21.16 kg/m .  Physical Exam   GENERAL APPEARANCE: alert and no distress  RESP: lungs clear to auscultation - no rales, rhonchi or wheezes  CV: regular rates and rhythm, normal S1 S2, no S3 or S4 and no murmur, click or rub

## 2021-07-23 ENCOUNTER — IMMUNIZATION (OUTPATIENT)
Dept: NURSING | Facility: CLINIC | Age: 76
End: 2021-07-23
Attending: INTERNAL MEDICINE
Payer: COMMERCIAL

## 2021-07-23 PROCEDURE — 91300 PR COVID VAC PFIZER DIL RECON 30 MCG/0.3 ML IM: CPT

## 2021-07-23 PROCEDURE — 0002A PR COVID VAC PFIZER DIL RECON 30 MCG/0.3 ML IM: CPT

## 2021-07-26 DIAGNOSIS — G47.00 INSOMNIA, UNSPECIFIED TYPE: ICD-10-CM

## 2021-07-27 RX ORDER — TRAZODONE HYDROCHLORIDE 100 MG/1
TABLET ORAL
Qty: 90 TABLET | Refills: 3 | Status: SHIPPED | OUTPATIENT
Start: 2021-07-27 | End: 2022-07-28

## 2021-08-30 DIAGNOSIS — Z79.4 TYPE 2 DIABETES MELLITUS WITH MICROALBUMINURIA, WITH LONG-TERM CURRENT USE OF INSULIN (H): ICD-10-CM

## 2021-08-30 DIAGNOSIS — E11.29 TYPE 2 DIABETES MELLITUS WITH MICROALBUMINURIA, WITH LONG-TERM CURRENT USE OF INSULIN (H): ICD-10-CM

## 2021-08-30 DIAGNOSIS — R80.9 TYPE 2 DIABETES MELLITUS WITH MICROALBUMINURIA, WITH LONG-TERM CURRENT USE OF INSULIN (H): ICD-10-CM

## 2021-08-30 RX ORDER — PEN NEEDLE, DIABETIC 29 G X1/2"
NEEDLE, DISPOSABLE MISCELLANEOUS
Qty: 100 EACH | Refills: 1 | Status: SHIPPED | OUTPATIENT
Start: 2021-08-30 | End: 2022-03-21

## 2021-09-03 ENCOUNTER — OFFICE VISIT (OUTPATIENT)
Dept: INTERNAL MEDICINE | Facility: CLINIC | Age: 76
End: 2021-09-03
Payer: COMMERCIAL

## 2021-09-03 VITALS
HEART RATE: 71 BPM | RESPIRATION RATE: 16 BRPM | OXYGEN SATURATION: 94 % | TEMPERATURE: 97.2 F | BODY MASS INDEX: 21.45 KG/M2 | SYSTOLIC BLOOD PRESSURE: 132 MMHG | HEIGHT: 61 IN | WEIGHT: 113.6 LBS | DIASTOLIC BLOOD PRESSURE: 58 MMHG

## 2021-09-03 DIAGNOSIS — Z11.59 NEED FOR HEPATITIS C SCREENING TEST: ICD-10-CM

## 2021-09-03 DIAGNOSIS — E11.29 TYPE 2 DIABETES MELLITUS WITH MICROALBUMINURIA, WITHOUT LONG-TERM CURRENT USE OF INSULIN (H): ICD-10-CM

## 2021-09-03 DIAGNOSIS — M25.561 ARTHRALGIA OF BOTH KNEES: ICD-10-CM

## 2021-09-03 DIAGNOSIS — M25.562 ARTHRALGIA OF BOTH KNEES: ICD-10-CM

## 2021-09-03 DIAGNOSIS — E06.3 AUTOIMMUNE HYPOTHYROIDISM: ICD-10-CM

## 2021-09-03 DIAGNOSIS — R80.9 TYPE 2 DIABETES MELLITUS WITH MICROALBUMINURIA, WITHOUT LONG-TERM CURRENT USE OF INSULIN (H): ICD-10-CM

## 2021-09-03 LAB — HBA1C MFR BLD: 7.4 % (ref 0–5.6)

## 2021-09-03 PROCEDURE — 84439 ASSAY OF FREE THYROXINE: CPT | Performed by: INTERNAL MEDICINE

## 2021-09-03 PROCEDURE — 83036 HEMOGLOBIN GLYCOSYLATED A1C: CPT | Performed by: INTERNAL MEDICINE

## 2021-09-03 PROCEDURE — 84443 ASSAY THYROID STIM HORMONE: CPT | Performed by: INTERNAL MEDICINE

## 2021-09-03 PROCEDURE — 86803 HEPATITIS C AB TEST: CPT | Performed by: INTERNAL MEDICINE

## 2021-09-03 PROCEDURE — 36415 COLL VENOUS BLD VENIPUNCTURE: CPT | Performed by: INTERNAL MEDICINE

## 2021-09-03 PROCEDURE — 99214 OFFICE O/P EST MOD 30 MIN: CPT | Performed by: INTERNAL MEDICINE

## 2021-09-03 ASSESSMENT — MIFFLIN-ST. JEOR: SCORE: 937.67

## 2021-09-03 ASSESSMENT — ENCOUNTER SYMPTOMS: SLEEP DISTURBANCE: 1

## 2021-09-03 NOTE — PROGRESS NOTES
"    Assessment & Plan     Type 2 diabetes mellitus with microalbuminuria, without long-term current use of insulin (H)  Recheck A1c-based on current home readings I would expect this to be somewhat improved.  Our goal is less than 8%.    Arthralgia of both knees  Not interested in seeing specialist.  Try some Tylenol and or diclofenac topical cream    Need for hepatitis C screening test  - Hepatitis C Screen Reflex to HCV RNA Quant and Genotype; Future                 Return in about 6 months (around 3/3/2022) for Annual Wellness Visit, Diabetes Check.    Braxton Boone MD  [facility]    Citlaly Coombs is a 76 year old who presents for the following health issues     HPI     Diabetes Follow-up    How often are you checking your blood sugar? One time daily  What time of day are you checking your blood sugars (select all that apply)?  Before meals  Have you had any blood sugars above 200?  No  Have you had any blood sugars below 70?  No    What symptoms do you notice when your blood sugar is low?  None    What concerns do you have today about your diabetes? None     Do you have any of these symptoms? (Select all that apply)  No numbness or tingling in feet.  No redness, sores or blisters on feet.  No complaints of excessive thirst.  No reports of blurry vision.  No significant changes to weight.    Have you had a diabetic eye exam in the last 12 months? No        BP Readings from Last 2 Encounters:   09/03/21 132/58   07/02/21 132/60     Hemoglobin A1C (%)   Date Value   05/28/2021 8.5 (H)   02/24/2021 8.3 (H)     LDL Cholesterol Calculated (mg/dL)   Date Value   02/24/2021 53   05/15/2020 40                       Review of Systems   Psychiatric/Behavioral: Positive for sleep disturbance.            Objective    /58   Pulse 71   Temp 97.2  F (36.2  C) (Temporal)   Resp 16   Ht 1.549 m (5' 1\")   Wt 51.5 kg (113 lb 9.6 oz)   SpO2 94%   BMI 21.46 kg/m    Body mass " index is 21.46 kg/m .  Physical Exam

## 2021-09-03 NOTE — PATIENT INSTRUCTIONS
For knees:    Acetaminophen 500-1000 mg twice daily   Or/and  Diclofenac (voltaren) cream - available over the counter

## 2021-09-03 NOTE — LETTER
September 10, 2021      Gregoria Gan  [address] [address]        Dear ,    We are writing to inform you of your test results.    {results letter list:[id number]}    Resulted Orders   TSH with free T4 reflex   Result Value Ref Range    TSH 0.28 (L) 0.40 - 4.00 mU/L   Hemoglobin A1c   Result Value Ref Range    Hemoglobin A1C 7.4 (H) 0.0 - 5.6 %      Comment:      Normal <5.7%   Prediabetes 5.7-6.4%    Diabetes 6.5% or higher     Note: Adopted from ADA consensus guidelines.   Hepatitis C Screen Reflex to HCV RNA Quant and Genotype   Result Value Ref Range    Hepatitis C Antibody Nonreactive Nonreactive    Narrative    Assay performance characteristics have not been established for newborns, infants, and children.   T4 free   Result Value Ref Range    Free T4 1.40 0.76 - 1.46 ng/dL       If you have any questions or concerns, please call the clinic at the number listed above.       Sincerely,      Braxton Boone MD

## 2021-09-03 NOTE — LETTER
September 10, 2021      Gregoria Gan  [address] [place] [id number]        Dear ,    We are writing to inform you of your test results.  Continue your diabetes medications - your overall control at this time is in our goal range.  Please change your thyroid medication by taking 1 tablet only 6 days per week and 1/2 tablet the other day.  This will reduce your dose by 1/2 tablet per week.       Resulted Orders   TSH with free T4 reflex   Result Value Ref Range    TSH 0.28 (L) 0.40 - 4.00 mU/L   Hemoglobin A1c   Result Value Ref Range    Hemoglobin A1C 7.4 (H) 0.0 - 5.6 %      Comment:      Normal <5.7%   Prediabetes 5.7-6.4%    Diabetes 6.5% or higher     Note: Adopted from ADA consensus guidelines.   Hepatitis C Screen Reflex to HCV RNA Quant and Genotype   Result Value Ref Range    Hepatitis C Antibody Nonreactive Nonreactive    Narrative    Assay performance characteristics have not been established for newborns, infants, and children.   T4 free   Result Value Ref Range    Free T4 1.40 0.76 - 1.46 ng/dL       If you have any questions or concerns, please call the clinic at the number listed above.       Sincerely,      Braxton Boone MD

## 2021-09-04 LAB
T4 FREE SERPL-MCNC: 1.4 NG/DL (ref 0.76–1.46)
TSH SERPL DL<=0.005 MIU/L-ACNC: 0.28 MU/L (ref 0.4–4)

## 2021-09-07 LAB — HCV AB SERPL QL IA: NONREACTIVE

## 2021-09-10 RX ORDER — LEVOTHYROXINE SODIUM 88 UG/1
88 TABLET ORAL DAILY
Qty: 90 TABLET | Refills: 3
Start: 2021-09-10 | End: 2022-02-28

## 2021-12-12 ENCOUNTER — NURSE TRIAGE (OUTPATIENT)
Dept: NURSING | Facility: CLINIC | Age: 76
End: 2021-12-12
Payer: COMMERCIAL

## 2021-12-12 NOTE — TELEPHONE ENCOUNTER
Triage Call: Jolanta, spouse, is calling, CTC on file. Cough, irritated throat. Calling today to update primary that patient tested positive for covid, declined triage. Wife is under quarantine - 3rd day. An wants to know after quarantine can she have the booster vaccine - pfizer.     An was informed to call back if he wants the patient to be triaged, she worsens, or if he has any questions or concerns. He stated understanding.      PCP or covering provider please advise.     Pamela Mosqueda RN Nursing Advisor 12/12/2021 3:26 PM     Reason for Disposition    [1] Caller requesting NON-URGENT health information AND [2] PCP's office is the best resource    Additional Information    Negative: [1] Caller is not with the adult (patient) AND [2] reporting urgent symptoms    Negative: Lab result questions    Negative: Medication questions    Negative: Caller can't be reached by phone    Negative: Caller has already spoken to PCP or another triager    Negative: RN needs further essential information from caller in order to complete triage    Negative: Requesting regular office appointment    Protocols used: INFORMATION ONLY CALL - NO TRIAGE[facility]

## 2022-01-15 DIAGNOSIS — E11.29 TYPE 2 DIABETES MELLITUS WITH MICROALBUMINURIA, WITHOUT LONG-TERM CURRENT USE OF INSULIN (H): ICD-10-CM

## 2022-01-15 DIAGNOSIS — R80.9 TYPE 2 DIABETES MELLITUS WITH MICROALBUMINURIA, WITHOUT LONG-TERM CURRENT USE OF INSULIN (H): ICD-10-CM

## 2022-01-17 RX ORDER — GLIMEPIRIDE 1 MG/1
1 TABLET ORAL
Qty: 90 TABLET | Refills: 0 | Status: SHIPPED | OUTPATIENT
Start: 2022-01-17 | End: 2022-03-16

## 2022-01-20 ENCOUNTER — VIRTUAL VISIT (OUTPATIENT)
Dept: INTERNAL MEDICINE | Facility: CLINIC | Age: 77
End: 2022-01-20
Payer: COMMERCIAL

## 2022-01-20 DIAGNOSIS — Z53.9 ERRONEOUS ENCOUNTER--DISREGARD: Primary | ICD-10-CM

## 2022-01-30 DIAGNOSIS — I10 HTN (HYPERTENSION), BENIGN: ICD-10-CM

## 2022-01-30 DIAGNOSIS — R80.9 TYPE 2 DIABETES MELLITUS WITH MICROALBUMINURIA, WITHOUT LONG-TERM CURRENT USE OF INSULIN (H): ICD-10-CM

## 2022-01-30 DIAGNOSIS — E11.29 TYPE 2 DIABETES MELLITUS WITH MICROALBUMINURIA, WITHOUT LONG-TERM CURRENT USE OF INSULIN (H): ICD-10-CM

## 2022-01-31 ENCOUNTER — OFFICE VISIT (OUTPATIENT)
Dept: URGENT CARE | Facility: URGENT CARE | Age: 77
End: 2022-01-31
Payer: COMMERCIAL

## 2022-01-31 ENCOUNTER — ANCILLARY PROCEDURE (OUTPATIENT)
Dept: GENERAL RADIOLOGY | Facility: CLINIC | Age: 77
End: 2022-01-31
Attending: FAMILY MEDICINE
Payer: COMMERCIAL

## 2022-01-31 VITALS
HEART RATE: 82 BPM | RESPIRATION RATE: 16 BRPM | TEMPERATURE: 97.9 F | SYSTOLIC BLOOD PRESSURE: 118 MMHG | BODY MASS INDEX: 21.35 KG/M2 | DIASTOLIC BLOOD PRESSURE: 50 MMHG | OXYGEN SATURATION: 95 % | WEIGHT: 113 LBS

## 2022-01-31 DIAGNOSIS — J18.9 PNEUMONIA DUE TO INFECTIOUS ORGANISM, UNSPECIFIED LATERALITY, UNSPECIFIED PART OF LUNG: Primary | ICD-10-CM

## 2022-01-31 DIAGNOSIS — R05.9 COUGH: ICD-10-CM

## 2022-01-31 LAB — SARS-COV-2 RNA RESP QL NAA+PROBE: NORMAL

## 2022-01-31 PROCEDURE — U0005 INFEC AGEN DETEC AMPLI PROBE: HCPCS | Mod: 90 | Performed by: FAMILY MEDICINE

## 2022-01-31 PROCEDURE — 71046 X-RAY EXAM CHEST 2 VIEWS: CPT | Performed by: RADIOLOGY

## 2022-01-31 PROCEDURE — U0003 INFECTIOUS AGENT DETECTION BY NUCLEIC ACID (DNA OR RNA); SEVERE ACUTE RESPIRATORY SYNDROME CORONAVIRUS 2 (SARS-COV-2) (CORONAVIRUS DISEASE [COVID-19]), AMPLIFIED PROBE TECHNIQUE, MAKING USE OF HIGH THROUGHPUT TECHNOLOGIES AS DESCRIBED BY CMS-2020-01-R: HCPCS | Mod: 90 | Performed by: FAMILY MEDICINE

## 2022-01-31 PROCEDURE — 99000 SPECIMEN HANDLING OFFICE-LAB: CPT | Performed by: FAMILY MEDICINE

## 2022-01-31 PROCEDURE — 99214 OFFICE O/P EST MOD 30 MIN: CPT | Performed by: FAMILY MEDICINE

## 2022-01-31 RX ORDER — AZITHROMYCIN 250 MG/1
TABLET, FILM COATED ORAL
Qty: 6 TABLET | Refills: 0 | Status: SHIPPED | OUTPATIENT
Start: 2022-01-31 | End: 2022-02-05

## 2022-01-31 RX ORDER — METFORMIN HCL 500 MG
TABLET, EXTENDED RELEASE 24 HR ORAL
Qty: 90 TABLET | Refills: 0 | Status: SHIPPED | OUTPATIENT
Start: 2022-01-31 | End: 2022-03-16

## 2022-01-31 RX ORDER — LOSARTAN POTASSIUM 50 MG/1
50 TABLET ORAL DAILY
Qty: 90 TABLET | Refills: 0 | Status: SHIPPED | OUTPATIENT
Start: 2022-01-31 | End: 2022-03-16

## 2022-01-31 NOTE — TELEPHONE ENCOUNTER
Prescription approved per [facility] Refill Protocol.  Madison Pichardo, RN  [facility] Triage Nurse

## 2022-01-31 NOTE — PATIENT INSTRUCTIONS
Patient Education       When You Have Pneumonia  You have been diagnosed with pneumonia. This is a serious lung infection. Most cases of pneumonia are caused by bacteria. But it can also be caused by:       Viruses    Fungi    Atypical bacteria such as mycoplasma    Inhaling certain chemicals    Pneumonia most often occurs in older adults, young children, and people with chronic health problems.   Home care    Take your medicine exactly as directed. Don t skip doses. Take your antibiotics as directed until they are all gone, even if you start to feel better. This will prevent the pneumonia from coming back.    Drink plenty of water daily, unless directed otherwise. This may help to loosen and thin lung mucus so that you can cough it up.    Use a cool-mist humidifier in your bedroom. Clean the humidifier every day.    Don t use medicines to suppress your cough unless your cough is dry, painful, or keeps you from sleep. Coughing up mucus is normal. It helps you recover. You may use an expectorant if your healthcare provider says it s OK.    You can use warm compresses or a heating pad on the lowest setting to relieve chest discomfort. Do this several times a day for short periods of time. To prevent injury to your skin, set the temperature to warm, not hot. Don t put the compress or pad directly on your skin. Make sure it has a cover or wrap it in a towel. This is to prevent skin burns.    Get plenty of rest until your fever, shortness of breath, and chest pain go away.    Plan to get a flu shot every year. The flu is a common cause of pneumonia. Getting a flu shot every year can help prevent both the flu and pneumonia.    Getting the pneumococcal vaccine  Talk with your healthcare provider about getting the pneumococcal vaccine. There are 2 kinds of pneumonia vaccines. You may need to get both. Pneumococcal pneumonia is caused by bacteria that spread from person to person. It can cause minor problems, such as ear  infections. But it can also turn into these life-threatening illnesses:       Infection of the lungs (pneumonia)    Infection of the covering of the brain and spinal cord (meningitis)    Infection of the blood (bacteremia)    People at the highest risk of pneumococcal disease include:        Children under age 2    Adults over age 65    People with certain health conditions    Smokers    This vaccine can help prevent pneumococcal disease in both adults and children. Some people should not have the vaccine. Make sure to ask your healthcare provider if you should have the vaccine.    Follow-up care  Make a follow-up appointment as directed.   When to call your healthcare provider  Call your healthcare provider right away if you have any of these:     Fever of 100.4 F ( 38 C) or higher    Mucus from the lungs (sputum) that s yellow, green, bloody, or smells bad    A large amount of sputum    Vomiting    Symptoms that get worse    New symptoms  Call 911  Call 911 right away if you have any of these:     Chest pain    Trouble breathing    Blue, purple, or gray lips or fingernails    Feeling of doom    Feeling faint or dizzy    Trouble talking  [employer] last reviewed this educational content on 11/1/2021 2000-2021 [employer]. All rights reserved. This information is not intended as a substitute for professional medical care. Always follow your healthcare professional's instructions.         Discharge Instructions for COVID-19 Patients  You have--or may have--COVID-19. Please follow the instructions listed below.   If you have a weakened immune system, discuss with your doctor any other actions you need to take.  How can I protect others?  If you have symptoms (fever, cough, body aches or trouble breathing):    Stay home and away from others (self-isolate) until:  ? Your other symptoms have resolved (gotten better). And   ? You've had no fever--and no medicine that reduces fever--for 1 full day (24  "hours). And   ? At least 10 days have passed since your symptoms started. (You may need to wait 20 days. Follow the advice of your care team.)  If you don't show symptoms, but testing showed that you have COVID-19:    Stay home and away from others (self-isolate) until at least 10 days have passed since the date of your first positive COVID-19 test.  During this time    Stay in your own room, even for meals. Use your own bathroom if you can.    Stay away from others in your home. No hugging, kissing or shaking hands. No visitors.    Don't go to work, school or anywhere else.    Clean \"high touch\" surfaces often (doorknobs, counters, handles). Use household cleaning spray or wipes.    You'll find a full list of  on the EPA website: www.epa.gov/pesticide-registration/list-n-disinfectants-use-against-sars-cov-2.    Cover your mouth and nose with a mask or other face covering to avoid spreading germs.    Wash your hands and face often. Use soap and water.    Caregivers in these groups are at risk for severe illness due to COVID-19:  ? People 65 years and older  ? People who live in a nursing home or long-term care facility  ? People with chronic disease (lung, heart, cancer, diabetes, kidney, liver, immunologic)  ? People who have a weakened immune system, including those who:    Are in cancer treatment    Take medicine that weakens the immune system, such as corticosteroids    Had a bone marrow or organ transplant    Have an immune deficiency    Have poorly controlled HIV or AIDS    Are obese (body mass index of 40 or higher)    Smoke regularly    Caregivers should wear gloves while washing dishes, handling laundry and cleaning bedrooms and bathrooms.    Use caution when washing and drying laundry: Don't shake dirty laundry and use the warmest water setting that you can.    For more tips on managing your health at home, go to www.cdc.gov/coronavirus/2019-ncov/downloads/10Things.pdf.  How can I take care of " myself at home?  1. Get lots of rest. Drink extra fluids (unless a doctor has told you not to).  2. Take Tylenol (acetaminophen) for fever or pain. If you have liver or kidney problems, ask your family doctor if it's okay to take Tylenol.   Adults can take either:   ? 650 mg (two 325 mg pills) every 4 to 6 hours, or   ? 1,000 mg (two 500 mg pills) every 8 hours as needed.  ? Note: Don't take more than 3,000 mg in one day. Acetaminophen is found in many medicines (both prescribed and over-the-counter medicines). Read all labels to be sure you don't take too much.   For children, check the Tylenol bottle for the right dose. The dose is based on the child's age or weight.  3. If you have other health problems (like cancer, heart failure, an organ transplant or severe kidney disease): Call your specialty clinic if you don't feel better in the next 2 days.  4. Know when to call 911. Emergency warning signs include:  ? Trouble breathing or shortness of breath  ? Pain or pressure in the chest that doesn't go away  ? Feeling confused like you haven't felt before, or not being able to wake up  ? Bluish-colored lips or face  5. Your doctor may have prescribed a blood thinner medicine. Follow their instructions.  Where can I get more information?    [facility] - About COVID-19:   [email or web address]    CDC - What to Do If You're Sick: www.cdc.gov/coronavirus/2019-ncov/about/steps-when-sick.html    CDC - Ending Home Isolation: www.cdc.gov/coronavirus/2019-ncov/hcp/disposition-in-home-patients.html    CDC - Caring for Someone: www.cdc.gov/coronavirus/2019-ncov/if-you-are-sick/care-for-someone.html    [facility] - Interim Guidance for Hospital Discharge to Home: www.health.[facility].[place]/diseases/coronavirus/hcp/hospdischarge.pdf    Below are the COVID-19 hotlines at the [facility] ([facility]). Interpreters are available.  ? For health questions: Call 565-425-1135 or 1-890.103.7971 (7 a.m. to 7  p.m.)  ? For questions about schools and childcare: Call 283-237-0073 or 1-196.803.2717 (7 a.m. to 7 p.m.)    For informational purposes only. Not to replace the advice of your health care provider. Clinically reviewed by Dr. Dominick Burton.   Copyright   2020 [facility]. All rights reserved. [employer] 883095 - REV 01/05/21.

## 2022-01-31 NOTE — PROGRESS NOTES
SUBJECTIVE: Gregoria Gan is a 76 year old female presenting with a chief complaint of cough .  Onset of symptoms was 4 week(s) ago.  Current and Associated symptoms: none  Treatment measures tried include OTC.  Predisposing factors include None.    Past Medical History:   Diagnosis Date     CKD (chronic kidney disease) stage 3, GFR 30-59 ml/min (H)      HTN (hypertension), benign      Other and unspecified hyperlipidemia      Type 2 diabetes, HbA1c goal < 7% (H)      Unspecified hypothyroidism      No Known Allergies  Social History     Tobacco Use     Smoking status: Never Smoker     Smokeless tobacco: Never Used   Substance Use Topics     Alcohol use: No     Alcohol/week: 0.0 standard drinks       ROS:  SKIN: no rash  GI: no vomiting    OBJECTIVE:  /50   Pulse 82   Temp 97.9  F (36.6  C) (Tympanic)   Resp 16   Wt 51.3 kg (113 lb)   SpO2 95%   BMI 21.35 kg/m  GENERAL APPEARANCE: healthy, alert and no distress  EYES: EOMI,  PERRL, conjunctiva clear  HENT: ear canals and TM's normal.  Nose and mouth without ulcers, erythema or lesions  RESP: rhonchi bibasilar  SKIN: no suspicious lesions or rashes    CXR  IMPRESSION: There are new interstitial infiltrates in both lung bases,  left greater than right, suspicious for pneumonia or possibly  aspiration pneumonitis. Upper lungs are clear. Heart size normal.      ICD-10-CM    1. Pneumonia due to infectious organism, unspecified laterality, unspecified part of lung  J18.9 azithromycin (ZITHROMAX) 250 MG tablet   2. Cough  R05.9 XR Chest 2 Views     Symptomatic; Yes; 1/3/2022 COVID-19 Virus (Coronavirus) by PCR     Symptomatic; Yes; 1/3/2022 COVID-19 Virus (Coronavirus) by PCR Nose     Quarantine  PPE used  No agp  Pt masked  ED if worse  Fluids/Rest, f/u if worse/not any better

## 2022-02-01 LAB — SARS-COV-2 RNA RESP QL NAA+PROBE: NOT DETECTED

## 2022-02-17 DIAGNOSIS — R05.3 CHRONIC COUGH: ICD-10-CM

## 2022-02-18 DIAGNOSIS — E78.5 HYPERLIPIDEMIA LDL GOAL <100: ICD-10-CM

## 2022-02-18 RX ORDER — FLUTICASONE PROPIONATE 50 MCG
SPRAY, SUSPENSION (ML) NASAL
Qty: 16 G | Refills: 1 | Status: SHIPPED | OUTPATIENT
Start: 2022-02-18 | End: 2022-03-16

## 2022-02-18 RX ORDER — ATORVASTATIN CALCIUM 20 MG/1
20 TABLET, FILM COATED ORAL DAILY
Qty: 90 TABLET | Refills: 2 | Status: SHIPPED | OUTPATIENT
Start: 2022-02-18 | End: 2022-11-07

## 2022-02-18 NOTE — TELEPHONE ENCOUNTER
Prescription approved per [facility] Refill Protocol.  Immanuel Lainez RN  [facility] Triage Nurse

## 2022-02-27 DIAGNOSIS — I10 HTN (HYPERTENSION), BENIGN: ICD-10-CM

## 2022-03-01 RX ORDER — AMLODIPINE BESYLATE 10 MG/1
10 TABLET ORAL DAILY
Qty: 30 TABLET | Refills: 0 | Status: SHIPPED | OUTPATIENT
Start: 2022-03-01 | End: 2022-03-16

## 2022-03-01 NOTE — TELEPHONE ENCOUNTER
Routing refill request to provider for review/approval because:  Labs not current:   Creatinine   Date Value Ref Range Status   02/24/2021 1.04 0.52 - 1.04 mg/dL Final       Tiffanie Hua RN

## 2022-03-04 ENCOUNTER — OFFICE VISIT (OUTPATIENT)
Dept: INTERNAL MEDICINE | Facility: CLINIC | Age: 77
End: 2022-03-04
Payer: COMMERCIAL

## 2022-03-04 VITALS
HEIGHT: 61 IN | HEART RATE: 73 BPM | OXYGEN SATURATION: 97 % | BODY MASS INDEX: 21.49 KG/M2 | WEIGHT: 113.8 LBS | SYSTOLIC BLOOD PRESSURE: 130 MMHG | TEMPERATURE: 99 F | DIASTOLIC BLOOD PRESSURE: 58 MMHG

## 2022-03-04 DIAGNOSIS — Z87.01 HX OF BACTERIAL PNEUMONIA: Primary | ICD-10-CM

## 2022-03-04 DIAGNOSIS — Z79.4 TYPE 2 DIABETES MELLITUS WITH MICROALBUMINURIA, WITH LONG-TERM CURRENT USE OF INSULIN (H): ICD-10-CM

## 2022-03-04 DIAGNOSIS — R42 DIZZINESS: ICD-10-CM

## 2022-03-04 DIAGNOSIS — N18.31 STAGE 3A CHRONIC KIDNEY DISEASE (H): ICD-10-CM

## 2022-03-04 DIAGNOSIS — E11.29 TYPE 2 DIABETES MELLITUS WITH MICROALBUMINURIA, WITH LONG-TERM CURRENT USE OF INSULIN (H): ICD-10-CM

## 2022-03-04 DIAGNOSIS — R80.9 TYPE 2 DIABETES MELLITUS WITH MICROALBUMINURIA, WITH LONG-TERM CURRENT USE OF INSULIN (H): ICD-10-CM

## 2022-03-04 DIAGNOSIS — N18.30 BENIGN HYPERTENSION WITH CKD (CHRONIC KIDNEY DISEASE) STAGE III (H): ICD-10-CM

## 2022-03-04 DIAGNOSIS — E06.3 AUTOIMMUNE HYPOTHYROIDISM: ICD-10-CM

## 2022-03-04 DIAGNOSIS — E83.52 HYPERCALCEMIA: ICD-10-CM

## 2022-03-04 DIAGNOSIS — I12.9 BENIGN HYPERTENSION WITH CKD (CHRONIC KIDNEY DISEASE) STAGE III (H): ICD-10-CM

## 2022-03-04 LAB
ERYTHROCYTE [DISTWIDTH] IN BLOOD BY AUTOMATED COUNT: 13.4 % (ref 10–15)
HBA1C MFR BLD: 7 % (ref 0–5.6)
HCT VFR BLD AUTO: 40.4 % (ref 35–47)
HGB BLD-MCNC: 13.3 G/DL (ref 11.7–15.7)
MCH RBC QN AUTO: 30.6 PG (ref 26.5–33)
MCHC RBC AUTO-ENTMCNC: 32.9 G/DL (ref 31.5–36.5)
MCV RBC AUTO: 93 FL (ref 78–100)
PLATELET # BLD AUTO: 211 10E3/UL (ref 150–450)
RBC # BLD AUTO: 4.35 10E6/UL (ref 3.8–5.2)
WBC # BLD AUTO: 6 10E3/UL (ref 4–11)

## 2022-03-04 PROCEDURE — 84443 ASSAY THYROID STIM HORMONE: CPT | Performed by: INTERNAL MEDICINE

## 2022-03-04 PROCEDURE — 85027 COMPLETE CBC AUTOMATED: CPT | Performed by: INTERNAL MEDICINE

## 2022-03-04 PROCEDURE — 80048 BASIC METABOLIC PNL TOTAL CA: CPT | Performed by: INTERNAL MEDICINE

## 2022-03-04 PROCEDURE — 84439 ASSAY OF FREE THYROXINE: CPT | Performed by: INTERNAL MEDICINE

## 2022-03-04 PROCEDURE — 80061 LIPID PANEL: CPT | Performed by: INTERNAL MEDICINE

## 2022-03-04 PROCEDURE — 83036 HEMOGLOBIN GLYCOSYLATED A1C: CPT | Performed by: INTERNAL MEDICINE

## 2022-03-04 PROCEDURE — 82043 UR ALBUMIN QUANTITATIVE: CPT | Performed by: INTERNAL MEDICINE

## 2022-03-04 PROCEDURE — 36415 COLL VENOUS BLD VENIPUNCTURE: CPT | Performed by: INTERNAL MEDICINE

## 2022-03-04 PROCEDURE — 99215 OFFICE O/P EST HI 40 MIN: CPT | Performed by: INTERNAL MEDICINE

## 2022-03-04 NOTE — PROGRESS NOTES
"  Assessment & Plan     Hx of bacterial pneumonia  Sx better     Dizziness  No clear cause- related to hypercalcemia- CBC with platelets; Future  - CBC with platelets    Autoimmune hypothyroidism  Cut back on dose- TSH with free T4 reflex; Future  - TSH with free T4 reflex  - T4 free  - levothyroxine (SYNTHROID/LEVOTHROID) 75 MCG tablet; Take 1 tablet (75 mcg) by mouth daily New sig and tab size    Type 2 diabetes mellitus with microalbuminuria, with long-term current use of insulin (H)  - Hemoglobin A1c    Hypercalcemia  Recheck - cause? Check PTH  - Parathyroid Hormone Intact; Future    Benign hypertension with CKD (chronic kidney disease) stage III (H)  Stage 3a chronic kidney disease (H)  Ok control    Review of the result(s) of each unique test - labs  Assessment requiring an independent historian(s) - family -   Ordering of each unique test  Prescription drug management  I spent a total of 62 minutes on the day of the visit.   Time spent doing chart review, history and exam, documentation and further activities per the note           Return in about 2 weeks (around 3/18/2022) for Annual Wellness Visit.    Braxton Boone MD  [facility]    Subjective   Gregoria Coombs is a 77 year old who presents for the following health issues  accompanied by her spouse.    [facility]     ED/UC Followup:    Facility:  [facility]  Date of visit: 01/31/2022  Reason for visit: pneumonia  Current Status: improved, but having some dizziness over the past couple days           Review of Systems         Objective    /58   Pulse 73   Temp 99  F (37.2  C) (Temporal)   Ht 1.549 m (5' 1\")   Wt 51.6 kg (113 lb 12.8 oz)   SpO2 97%   BMI 21.50 kg/m    Body mass index is 21.5 kg/m .  Physical Exam   GENERAL: healthy, alert and no distress  EYES: Eyes grossly normal to inspection, PERRL and conjunctivae and sclerae normal  NECK: no adenopathy, no asymmetry, masses, or scars and thyroid normal to " palpation  RESP: lungs clear to auscultation - no rales, rhonchi or wheezes  CV: regular rates and rhythm, normal S1 S2, no S3 or S4, grade 2/6 systolic murmur heard best over the LSB, more noticeable sitting position, peripheral pulses strong and no peripheral edema  ABDOMEN: soft, nontender, without hepatosplenomegaly or masses and bowel sounds normal    Results for orders placed or performed in visit on 03/04/22   Hemoglobin A1c     Status: Abnormal   Result Value Ref Range    Hemoglobin A1C 7.0 (H) 0.0 - 5.6 %   Basic metabolic panel     Status: Abnormal   Result Value Ref Range    Sodium 140 133 - 144 mmol/L    Potassium 4.2 3.4 - 5.3 mmol/L    Chloride 110 (H) 94 - 109 mmol/L    Carbon Dioxide (CO2) 24 20 - 32 mmol/L    Anion Gap 6 3 - 14 mmol/L    Urea Nitrogen 20 7 - 30 mg/dL    Creatinine 1.21 (H) 0.52 - 1.04 mg/dL    Calcium 11.4 (H) 8.5 - 10.1 mg/dL    Glucose 260 (H) 70 - 99 mg/dL    GFR Estimate 46 (L) >60 mL/min/1.73m2   Lipid panel reflex to direct LDL Fasting     Status: Abnormal   Result Value Ref Range    Cholesterol 141 <200 mg/dL    Triglycerides 356 (H) <150 mg/dL    Direct Measure HDL 38 (L) >=50 mg/dL    LDL Cholesterol Calculated 32 <=100 mg/dL    Non HDL Cholesterol 103 <130 mg/dL    Patient Fasting > 8hrs? Yes     Narrative    Cholesterol  Desirable:  <200 mg/dL    Triglycerides  Normal:  Less than 150 mg/dL  Borderline High:  150-199 mg/dL  High:  200-499 mg/dL  Very High:  Greater than or equal to 500 mg/dL    Direct Measure HDL  Female:  Greater than or equal to 50 mg/dL   Male:  Greater than or equal to 40 mg/dL    LDL Cholesterol  Desirable:  <100mg/dL  Above Desirable:  100-129 mg/dL   Borderline High:  130-159 mg/dL   High:  160-189 mg/dL   Very High:  >= 190 mg/dL    Non HDL Cholesterol  Desirable:  130 mg/dL  Above Desirable:  130-159 mg/dL  Borderline High:  160-189 mg/dL  High:  190-219 mg/dL  Very High:  Greater than or equal to 220 mg/dL   Albumin Random Urine Quantitative with  Creat Ratio     Status: Abnormal   Result Value Ref Range    Creatinine Urine mg/dL 71 mg/dL    Albumin Urine mg/L 53 mg/L    Albumin Urine mg/g Cr 74.65 (H) 0.00 - 25.00 mg/g Cr   TSH with free T4 reflex     Status: Abnormal   Result Value Ref Range    TSH 0.23 (L) 0.40 - 4.00 mU/L   CBC with platelets     Status: Normal   Result Value Ref Range    WBC Count 6.0 4.0 - 11.0 10e3/uL    RBC Count 4.35 3.80 - 5.20 10e6/uL    Hemoglobin 13.3 11.7 - 15.7 g/dL    Hematocrit 40.4 35.0 - 47.0 %    MCV 93 78 - 100 fL    MCH 30.6 26.5 - 33.0 pg    MCHC 32.9 31.5 - 36.5 g/dL    RDW 13.4 10.0 - 15.0 %    Platelet Count 211 150 - 450 10e3/uL   T4 free     Status: Abnormal   Result Value Ref Range    Free T4 1.53 (H) 0.76 - 1.46 ng/dL

## 2022-03-05 LAB
ANION GAP SERPL CALCULATED.3IONS-SCNC: 6 MMOL/L (ref 3–14)
BUN SERPL-MCNC: 20 MG/DL (ref 7–30)
CALCIUM SERPL-MCNC: 11.4 MG/DL (ref 8.5–10.1)
CHLORIDE BLD-SCNC: 110 MMOL/L (ref 94–109)
CHOLEST SERPL-MCNC: 141 MG/DL
CO2 SERPL-SCNC: 24 MMOL/L (ref 20–32)
CREAT SERPL-MCNC: 1.21 MG/DL (ref 0.52–1.04)
CREAT UR-MCNC: 71 MG/DL
FASTING STATUS PATIENT QL REPORTED: YES
GFR SERPL CREATININE-BSD FRML MDRD: 46 ML/MIN/1.73M2
GLUCOSE BLD-MCNC: 260 MG/DL (ref 70–99)
HDLC SERPL-MCNC: 38 MG/DL
LDLC SERPL CALC-MCNC: 32 MG/DL
MICROALBUMIN UR-MCNC: 53 MG/L
MICROALBUMIN/CREAT UR: 74.65 MG/G CR (ref 0–25)
NONHDLC SERPL-MCNC: 103 MG/DL
POTASSIUM BLD-SCNC: 4.2 MMOL/L (ref 3.4–5.3)
SODIUM SERPL-SCNC: 140 MMOL/L (ref 133–144)
T4 FREE SERPL-MCNC: 1.53 NG/DL (ref 0.76–1.46)
TRIGL SERPL-MCNC: 356 MG/DL
TSH SERPL DL<=0.005 MIU/L-ACNC: 0.23 MU/L (ref 0.4–4)

## 2022-03-06 RX ORDER — LEVOTHYROXINE SODIUM 75 UG/1
75 TABLET ORAL DAILY
Qty: 90 TABLET | Refills: 1 | Status: SHIPPED | OUTPATIENT
Start: 2022-03-06 | End: 2022-06-02

## 2022-03-11 DIAGNOSIS — R80.9 TYPE 2 DIABETES MELLITUS WITH MICROALBUMINURIA, WITH LONG-TERM CURRENT USE OF INSULIN (H): ICD-10-CM

## 2022-03-11 DIAGNOSIS — Z79.4 TYPE 2 DIABETES MELLITUS WITH MICROALBUMINURIA, WITH LONG-TERM CURRENT USE OF INSULIN (H): ICD-10-CM

## 2022-03-11 DIAGNOSIS — E11.29 TYPE 2 DIABETES MELLITUS WITH MICROALBUMINURIA, WITH LONG-TERM CURRENT USE OF INSULIN (H): ICD-10-CM

## 2022-03-11 RX ORDER — INSULIN GLARGINE 100 [IU]/ML
INJECTION, SOLUTION SUBCUTANEOUS
Qty: 30 ML | Refills: 1 | Status: SHIPPED | OUTPATIENT
Start: 2022-03-11 | End: 2022-10-25

## 2022-03-16 ENCOUNTER — OFFICE VISIT (OUTPATIENT)
Dept: INTERNAL MEDICINE | Facility: CLINIC | Age: 77
End: 2022-03-16
Payer: COMMERCIAL

## 2022-03-16 VITALS
HEIGHT: 61 IN | HEART RATE: 75 BPM | DIASTOLIC BLOOD PRESSURE: 60 MMHG | BODY MASS INDEX: 21.11 KG/M2 | OXYGEN SATURATION: 96 % | SYSTOLIC BLOOD PRESSURE: 126 MMHG | WEIGHT: 111.8 LBS | TEMPERATURE: 98.3 F | RESPIRATION RATE: 16 BRPM

## 2022-03-16 DIAGNOSIS — I12.9 BENIGN HYPERTENSION WITH CKD (CHRONIC KIDNEY DISEASE) STAGE III (H): ICD-10-CM

## 2022-03-16 DIAGNOSIS — E78.5 HYPERLIPIDEMIA LDL GOAL <100: ICD-10-CM

## 2022-03-16 DIAGNOSIS — M85.89 OSTEOPENIA OF MULTIPLE SITES: ICD-10-CM

## 2022-03-16 DIAGNOSIS — E06.3 AUTOIMMUNE HYPOTHYROIDISM: ICD-10-CM

## 2022-03-16 DIAGNOSIS — R41.3 MEMORY LOSS: ICD-10-CM

## 2022-03-16 DIAGNOSIS — R80.9 TYPE 2 DIABETES MELLITUS WITH MICROALBUMINURIA, WITHOUT LONG-TERM CURRENT USE OF INSULIN (H): ICD-10-CM

## 2022-03-16 DIAGNOSIS — I10 HTN (HYPERTENSION), BENIGN: ICD-10-CM

## 2022-03-16 DIAGNOSIS — E83.52 HYPERCALCEMIA: ICD-10-CM

## 2022-03-16 DIAGNOSIS — E11.29 TYPE 2 DIABETES MELLITUS WITH MICROALBUMINURIA, WITHOUT LONG-TERM CURRENT USE OF INSULIN (H): ICD-10-CM

## 2022-03-16 DIAGNOSIS — N18.30 BENIGN HYPERTENSION WITH CKD (CHRONIC KIDNEY DISEASE) STAGE III (H): ICD-10-CM

## 2022-03-16 DIAGNOSIS — Z00.00 ENCOUNTER FOR MEDICARE ANNUAL WELLNESS EXAM: Primary | ICD-10-CM

## 2022-03-16 LAB
DEPRECATED CALCIDIOL+CALCIFEROL SERPL-MC: 70 UG/L (ref 20–75)
PTH-INTACT SERPL-MCNC: 92 PG/ML (ref 18–80)

## 2022-03-16 PROCEDURE — 99397 PER PM REEVAL EST PAT 65+ YR: CPT | Performed by: INTERNAL MEDICINE

## 2022-03-16 PROCEDURE — 82306 VITAMIN D 25 HYDROXY: CPT | Performed by: INTERNAL MEDICINE

## 2022-03-16 PROCEDURE — 36415 COLL VENOUS BLD VENIPUNCTURE: CPT | Performed by: INTERNAL MEDICINE

## 2022-03-16 PROCEDURE — 83970 ASSAY OF PARATHORMONE: CPT | Performed by: INTERNAL MEDICINE

## 2022-03-16 PROCEDURE — 99214 OFFICE O/P EST MOD 30 MIN: CPT | Mod: 25 | Performed by: INTERNAL MEDICINE

## 2022-03-16 RX ORDER — AMLODIPINE BESYLATE 10 MG/1
10 TABLET ORAL DAILY
Qty: 90 TABLET | Refills: 3 | Status: SHIPPED | OUTPATIENT
Start: 2022-03-16 | End: 2023-03-20

## 2022-03-16 RX ORDER — GLIMEPIRIDE 1 MG/1
1 TABLET ORAL
Qty: 90 TABLET | Refills: 3 | Status: SHIPPED | OUTPATIENT
Start: 2022-03-16 | End: 2022-10-28 | Stop reason: ALTCHOICE

## 2022-03-16 RX ORDER — LOSARTAN POTASSIUM 50 MG/1
50 TABLET ORAL DAILY
Qty: 90 TABLET | Refills: 3 | Status: SHIPPED | OUTPATIENT
Start: 2022-03-16 | End: 2023-04-21

## 2022-03-16 RX ORDER — METFORMIN HCL 500 MG
TABLET, EXTENDED RELEASE 24 HR ORAL
Qty: 90 TABLET | Refills: 3 | Status: SHIPPED | OUTPATIENT
Start: 2022-03-16 | End: 2023-04-14

## 2022-03-16 NOTE — PROGRESS NOTES
SUBJECTIVE:   Gregoria Gan is a 77 year old female who presents for Preventive Visit.      Patient has been advised of split billing requirements and indicates understanding: Yes  Are you in the first 12 months of your Medicare coverage?  No    HPI  Do you feel safe in your environment? Yes    Have you ever done Advance Care Planning? (For example, a Health Directive, POLST, or a discussion with a medical provider or your loved ones about your wishes): Yes, advance care planning is on file.       Fall risk  Fallen 2 or more times in the past year?: No  Any fall with injury in the past year?: No    Cognitive Screening   1) Repeat 3 items (Leader, Season, Table)    2) Clock draw: not attempted  3) 3 item recall: Recalls NO objects   Results: 0 items recalled: PROBABLE COGNITIVE IMPAIRMENT, **INFORM PROVIDER**    Mini-CogTM Copyright S Izaiah. Licensed by the author for use in [facility]; reprinted with permission (delaney@[facility]). All rights reserved.      Do you have sleep apnea, excessive snoring or daytime drowsiness?: no    Reviewed and updated as needed this visit by clinical staff   Tobacco  Allergies  Meds              Reviewed and updated as needed this visit by Provider                 Social History     Tobacco Use     Smoking status: Never Smoker     Smokeless tobacco: Never Used   Substance Use Topics     Alcohol use: No     Alcohol/week: 0.0 standard drinks             Current providers sharing in care for this patient include:   Patient Care Team:  Braxton Boone MD as PCP - General  Braxton Boone MD as Assigned PCP    The following health maintenance items are reviewed in Epic and correct as of today:  Health Maintenance Due   Topic Date Due     ZOSTER IMMUNIZATION (2 of 3) 12/14/2017     FALL RISK ASSESSMENT  01/21/2021     EYE EXAM  03/09/2021     INFLUENZA VACCINE (1) 09/01/2021     COVID-19 Vaccine (3 - Booster for Pfizer series) 12/23/2021     PHQ-2 (once per calendar  "year)  01/01/2022     DTAP/TDAP/TD IMMUNIZATION (2 - Td or Tdap) 02/27/2022     Lab work is in process  Labs reviewed in EPIC      Mammogram Screening - Patient over age 75, has elected to discontinue screenings.  Pertinent mammograms are reviewed under the imaging tab.    Review of Systems  Constitutional, HEENT, cardiovascular, pulmonary, gi and gu systems are negative, except as otherwise noted.    OBJECTIVE:   /60   Pulse 75   Temp 98.3  F (36.8  C) (Tympanic)   Resp 16   Ht 1.549 m (5' 1\")   Wt 50.7 kg (111 lb 12.8 oz)   SpO2 96%   BMI 21.12 kg/m   Estimated body mass index is 21.12 kg/m  as calculated from the following:    Height as of this encounter: 1.549 m (5' 1\").    Weight as of this encounter: 50.7 kg (111 lb 12.8 oz).  Physical Exam  GENERAL APPEARANCE: healthy, alert and no distress  EYES: Eyes grossly normal to inspection, PERRL and conjunctivae and sclerae normal  HENT: oropharynx clear and oral mucous membranes moist  NECK: no adenopathy, no asymmetry, masses, or scars and thyroid normal to palpation  RESP: lungs clear to auscultation - no rales, rhonchi or wheezes  CV: regular rate and rhythm, normal S1 S2, no S3 or S4, no murmur, click or rub, no peripheral edema and peripheral pulses strong  ABDOMEN: soft, nontender, no hepatosplenomegaly, no masses and bowel sounds normal  MS: no musculoskeletal defects are noted and gait is age appropriate without ataxia  SKIN: no suspicious lesions or rashes  NEURO: Normal strength and tone, sensory exam grossly normal, mentation intact and speech normal  DIABETIC FOOT EXAM: normal DP and PT pulses, no trophic changes or ulcerative lesions, normal sensory exam and normal monofilament exam  PSYCH: well groomed and judgement and insight impaired    pending    ASSESSMENT / PLAN:       ICD-10-CM    1. Encounter for Medicare annual wellness exam  Z00.00    2. Memory loss  R41.3 Adult Neurology  Referral   3. Hypercalcemia  E83.52 Vitamin D " "Deficiency     Parathyroid Hormone Intact     Vitamin D Deficiency   4. Osteopenia of multiple sites  M85.89 DX Hip/Pelvis/Spine   5. Type 2 diabetes mellitus with microalbuminuria, without long-term current use of insulin (H)  E11.29     R80.9    6. Hyperlipidemia LDL goal <100  E78.5    7. Benign hypertension with CKD (chronic kidney disease) stage III (H)  I12.9     N18.30    8. Autoimmune hypothyroidism  E06.3      -Updated screening, immunizations, prevention.  Please see health maintenance list, care gaps  -await labs on eval of hypercalcemia  -continue current diabetic regimen. Control is reasonable for her.     Patient has been advised of split billing requirements and indicates understanding: Yes    COUNSELING:  Reviewed preventive health counseling, as reflected in patient instructions    Estimated body mass index is 21.12 kg/m  as calculated from the following:    Height as of this encounter: 1.549 m (5' 1\").    Weight as of this encounter: 50.7 kg (111 lb 12.8 oz).        She reports that she has never smoked. She has never used smokeless tobacco.      Appropriate preventive services were discussed with this patient, including applicable screening as appropriate for cardiovascular disease, diabetes, osteopenia/osteoporosis, and glaucoma.  As appropriate for age/gender, discussed screening for colorectal cancer, prostate cancer, breast cancer, and cervical cancer. Checklist reviewing preventive services available has been given to the patient.    Reviewed patients plan of care and provided an AVS. The Basic Care Plan (routine screening as documented in Health Maintenance) for Gregoria Coombs meets the Care Plan requirement. This Care Plan has been established and reviewed with the Patient.    Counseling Resources:  ATP IV Guidelines  Pooled Cohorts Equation Calculator  Breast Cancer Risk Calculator  Breast Cancer: Medication to Reduce Risk  FRAX Risk Assessment  ICSI Preventive Guidelines  Dietary Guidelines for " Americans, 2010  USDA's MyPlate  ASA Prophylaxis  Lung CA Screening    Braxton Boone MD  [facility]    Identified Health Risks:

## 2022-03-16 NOTE — LETTER
April 28, 2022      Gregoria Gan  [address] [address]        Dear ,    We are writing to inform you of your test results.    I would like you to get a DEXA scan due to your recent lab results.  This is done at the [facility].  You can call [phone] to schedule, or with any questions.    Resulted Orders   Parathyroid Hormone Intact   Result Value Ref Range    Parathyroid Hormone Intact 92 (H) 18 - 80 pg/mL   Vitamin D Deficiency   Result Value Ref Range    Vitamin D, Total (25-Hydroxy) 70 20 - 75 ug/L    Narrative    Season, race, dietary intake, and treatment affect the concentration of 25-hydroxy-Vitamin D. Values may decrease during winter months and increase during summer months. Values 20-29 ug/L may indicate Vitamin D insufficiency and values <20 ug/L may indicate Vitamin D deficiency.    Vitamin D determination is routinely performed by an immunoassay specific for 25 hydroxyvitamin D3.  If an individual is on vitamin D2(ergocalciferol) supplementation, please specify 25 OH vitamin D2 and D3 level determination by LCMSMS test [id number].         If you have any questions or concerns, please call the clinic at the number listed above.       Sincerely,      Braxton Boone MD

## 2022-03-30 DIAGNOSIS — E06.3 AUTOIMMUNE HYPOTHYROIDISM: ICD-10-CM

## 2022-03-31 NOTE — TELEPHONE ENCOUNTER
Routing refill request to provider for review/approval because:  TSH   Date Value Ref Range Status   03/04/2022 0.23 (L) 0.40 - 4.00 mU/L Final   02/24/2021 0.22 (L) 0.40 - 4.00 mU/L Final         Immanuel Lainez RN  [facility] Triage Nurse

## 2022-04-01 RX ORDER — LEVOTHYROXINE SODIUM 88 UG/1
TABLET ORAL
Qty: 30 TABLET | Refills: 0 | Status: SHIPPED | OUTPATIENT
Start: 2022-04-01 | End: 2022-06-02 | Stop reason: DRUGHIGH

## 2022-04-24 DIAGNOSIS — E21.3 HYPERPARATHYROIDISM (H): Primary | ICD-10-CM

## 2022-04-27 DIAGNOSIS — E06.3 AUTOIMMUNE HYPOTHYROIDISM: ICD-10-CM

## 2022-04-29 NOTE — TELEPHONE ENCOUNTER
Routing refill request to provider for review/approval because:  TSH   Date Value Ref Range Status   03/04/2022 0.23 (L) 0.40 - 4.00 mU/L Final   02/24/2021 0.22 (L) 0.40 - 4.00 mU/L Final         Immanuel Lainez RN  [facility] Triage Nurse

## 2022-04-30 DIAGNOSIS — R05.3 CHRONIC COUGH: ICD-10-CM

## 2022-05-02 RX ORDER — FLUTICASONE PROPIONATE 50 MCG
SPRAY, SUSPENSION (ML) NASAL
Qty: 16 G | Refills: 11 | Status: SHIPPED | OUTPATIENT
Start: 2022-05-02 | End: 2023-05-30

## 2022-05-09 ENCOUNTER — ANCILLARY PROCEDURE (OUTPATIENT)
Dept: BONE DENSITY | Facility: CLINIC | Age: 77
End: 2022-05-09
Attending: INTERNAL MEDICINE
Payer: COMMERCIAL

## 2022-05-09 DIAGNOSIS — E21.3 HYPERPARATHYROIDISM (H): ICD-10-CM

## 2022-05-09 PROCEDURE — 77080 DXA BONE DENSITY AXIAL: CPT | Performed by: INTERNAL MEDICINE

## 2022-05-20 ENCOUNTER — OFFICE VISIT (OUTPATIENT)
Dept: URGENT CARE | Facility: URGENT CARE | Age: 77
End: 2022-05-20

## 2022-05-20 ENCOUNTER — ANCILLARY PROCEDURE (OUTPATIENT)
Dept: GENERAL RADIOLOGY | Facility: CLINIC | Age: 77
End: 2022-05-20
Attending: FAMILY MEDICINE
Payer: COMMERCIAL

## 2022-05-20 VITALS
WEIGHT: 111 LBS | OXYGEN SATURATION: 94 % | BODY MASS INDEX: 20.97 KG/M2 | SYSTOLIC BLOOD PRESSURE: 140 MMHG | RESPIRATION RATE: 16 BRPM | TEMPERATURE: 97.4 F | HEART RATE: 75 BPM | DIASTOLIC BLOOD PRESSURE: 65 MMHG

## 2022-05-20 DIAGNOSIS — R05.9 COUGH: Primary | ICD-10-CM

## 2022-05-20 PROCEDURE — 99214 OFFICE O/P EST MOD 30 MIN: CPT | Performed by: FAMILY MEDICINE

## 2022-05-20 PROCEDURE — 71046 X-RAY EXAM CHEST 2 VIEWS: CPT | Mod: TC | Performed by: RADIOLOGY

## 2022-05-20 RX ORDER — PREDNISONE 20 MG/1
20 TABLET ORAL 2 TIMES DAILY
Qty: 10 TABLET | Refills: 0 | Status: SHIPPED | OUTPATIENT
Start: 2022-05-20 | End: 2022-05-25

## 2022-05-20 RX ORDER — ALBUTEROL SULFATE 90 UG/1
2 AEROSOL, METERED RESPIRATORY (INHALATION) EVERY 6 HOURS
Qty: 18 G | Refills: 0 | Status: SHIPPED | OUTPATIENT
Start: 2022-05-20 | End: 2022-06-19

## 2022-05-22 NOTE — PROGRESS NOTES
SUBJECTIVE: Gregoria Gan is a 77 year old female presenting with a chief complaint of cough .  Onset of symptoms was day(s) ago.    Past Medical History:   Diagnosis Date     CKD (chronic kidney disease) stage 3, GFR 30-59 ml/min (H)      HTN (hypertension), benign      Other and unspecified hyperlipidemia      Type 2 diabetes, HbA1c goal < 7% (H)      Unspecified hypothyroidism      No Known Allergies  Social History     Tobacco Use     Smoking status: Never Smoker     Smokeless tobacco: Never Used   Substance Use Topics     Alcohol use: No     Alcohol/week: 0.0 standard drinks       ROS:  SKIN: no rash  GI: no vomiting    OBJECTIVE:  BP (!) 140/65   Pulse 75   Temp 97.4  F (36.3  C) (Tympanic)   Resp 16   Wt 50.3 kg (111 lb)   SpO2 94%   BMI 20.97 kg/m  GENERAL APPEARANCE: healthy, alert and no distress  EYES: EOMI,  PERRL, conjunctiva clear  HENT: ear canals and TM's normal.  Nose and mouth without ulcers, erythema or lesions  RESP: lungs clear to auscultation - no rales, rhonchi or wheezes  SKIN: no suspicious lesions or rashes    Xray without acute findings, no pneumonia read by Melvin Galan D.O.      ICD-10-CM    1. Cough  R05.9 XR Chest 2 Views     albuterol (PROAIR HFA) 108 (90 Base) MCG/ACT inhaler     predniSONE (DELTASONE) 20 MG tablet     Adult Pulmonary Medicine Referral       Fluids/Rest, f/u if worse/not any better

## 2022-05-25 ENCOUNTER — APPOINTMENT (OUTPATIENT)
Dept: GENERAL RADIOLOGY | Facility: CLINIC | Age: 77
End: 2022-05-25
Attending: EMERGENCY MEDICINE
Payer: COMMERCIAL

## 2022-05-25 ENCOUNTER — HOSPITAL ENCOUNTER (EMERGENCY)
Facility: CLINIC | Age: 77
Discharge: HOME | End: 2022-05-26
Attending: EMERGENCY MEDICINE | Admitting: EMERGENCY MEDICINE
Payer: COMMERCIAL

## 2022-05-25 DIAGNOSIS — R42 DIZZINESS: ICD-10-CM

## 2022-05-25 DIAGNOSIS — R73.9 HYPERGLYCEMIA: ICD-10-CM

## 2022-05-25 DIAGNOSIS — R53.1 WEAKNESS GENERALIZED: ICD-10-CM

## 2022-05-25 LAB
ALBUMIN SERPL-MCNC: 3.6 G/DL (ref 3.4–5)
ALP SERPL-CCNC: 92 U/L (ref 40–150)
ALT SERPL W P-5'-P-CCNC: 51 U/L (ref 0–50)
ANION GAP SERPL CALCULATED.3IONS-SCNC: 9 MMOL/L (ref 3–14)
AST SERPL W P-5'-P-CCNC: 23 U/L (ref 0–45)
ATRIAL RATE - MUSE: 73 BPM
BASOPHILS # BLD AUTO: 0 10E3/UL (ref 0–0.2)
BASOPHILS NFR BLD AUTO: 0 %
BILIRUB SERPL-MCNC: 0.8 MG/DL (ref 0.2–1.3)
BUN SERPL-MCNC: 28 MG/DL (ref 7–30)
CALCIUM SERPL-MCNC: 11.3 MG/DL (ref 8.5–10.1)
CHLORIDE BLD-SCNC: 102 MMOL/L (ref 94–109)
CO2 SERPL-SCNC: 24 MMOL/L (ref 20–32)
CREAT SERPL-MCNC: 1.05 MG/DL (ref 0.52–1.04)
DIASTOLIC BLOOD PRESSURE - MUSE: NORMAL MMHG
EOSINOPHIL # BLD AUTO: 0 10E3/UL (ref 0–0.7)
EOSINOPHIL NFR BLD AUTO: 0 %
ERYTHROCYTE [DISTWIDTH] IN BLOOD BY AUTOMATED COUNT: 12.4 % (ref 10–15)
FLUAV RNA SPEC QL NAA+PROBE: NEGATIVE
FLUBV RNA RESP QL NAA+PROBE: NEGATIVE
GFR SERPL CREATININE-BSD FRML MDRD: 54 ML/MIN/1.73M2
GLUCOSE BLD-MCNC: 335 MG/DL (ref 70–99)
HCT VFR BLD AUTO: 43 % (ref 35–47)
HGB BLD-MCNC: 15.1 G/DL (ref 11.7–15.7)
IMM GRANULOCYTES # BLD: 0.1 10E3/UL
IMM GRANULOCYTES NFR BLD: 1 %
INTERPRETATION ECG - MUSE: NORMAL
LYMPHOCYTES # BLD AUTO: 1.2 10E3/UL (ref 0.8–5.3)
LYMPHOCYTES NFR BLD AUTO: 16 %
MCH RBC QN AUTO: 31.5 PG (ref 26.5–33)
MCHC RBC AUTO-ENTMCNC: 35.1 G/DL (ref 31.5–36.5)
MCV RBC AUTO: 90 FL (ref 78–100)
MONOCYTES # BLD AUTO: 0.6 10E3/UL (ref 0–1.3)
MONOCYTES NFR BLD AUTO: 8 %
NEUTROPHILS # BLD AUTO: 5.5 10E3/UL (ref 1.6–8.3)
NEUTROPHILS NFR BLD AUTO: 75 %
NRBC # BLD AUTO: 0 10E3/UL
NRBC BLD AUTO-RTO: 0 /100
P AXIS - MUSE: 57 DEGREES
PLATELET # BLD AUTO: 259 10E3/UL (ref 150–450)
POTASSIUM BLD-SCNC: 4 MMOL/L (ref 3.4–5.3)
PR INTERVAL - MUSE: 160 MS
PROT SERPL-MCNC: 8.1 G/DL (ref 6.8–8.8)
QRS DURATION - MUSE: 78 MS
QT - MUSE: 384 MS
QTC - MUSE: 423 MS
R AXIS - MUSE: 85 DEGREES
RBC # BLD AUTO: 4.8 10E6/UL (ref 3.8–5.2)
RSV RNA SPEC NAA+PROBE: NEGATIVE
SARS-COV-2 RNA RESP QL NAA+PROBE: NEGATIVE
SODIUM SERPL-SCNC: 135 MMOL/L (ref 133–144)
SYSTOLIC BLOOD PRESSURE - MUSE: NORMAL MMHG
T AXIS - MUSE: 78 DEGREES
TROPONIN I SERPL HS-MCNC: 10 NG/L
VENTRICULAR RATE- MUSE: 73 BPM
WBC # BLD AUTO: 7.4 10E3/UL (ref 4–11)

## 2022-05-25 PROCEDURE — 258N000003 HC RX IP 258 OP 636: Performed by: EMERGENCY MEDICINE

## 2022-05-25 PROCEDURE — 99285 EMERGENCY DEPT VISIT HI MDM: CPT | Mod: CS,25

## 2022-05-25 PROCEDURE — 80053 COMPREHEN METABOLIC PANEL: CPT | Performed by: EMERGENCY MEDICINE

## 2022-05-25 PROCEDURE — 96360 HYDRATION IV INFUSION INIT: CPT

## 2022-05-25 PROCEDURE — 71046 X-RAY EXAM CHEST 2 VIEWS: CPT

## 2022-05-25 PROCEDURE — 93005 ELECTROCARDIOGRAM TRACING: CPT

## 2022-05-25 PROCEDURE — C9803 HOPD COVID-19 SPEC COLLECT: HCPCS

## 2022-05-25 PROCEDURE — 36415 COLL VENOUS BLD VENIPUNCTURE: CPT | Performed by: EMERGENCY MEDICINE

## 2022-05-25 PROCEDURE — 84484 ASSAY OF TROPONIN QUANT: CPT | Performed by: EMERGENCY MEDICINE

## 2022-05-25 PROCEDURE — 85025 COMPLETE CBC W/AUTO DIFF WBC: CPT | Performed by: EMERGENCY MEDICINE

## 2022-05-25 PROCEDURE — 250N000013 HC RX MED GY IP 250 OP 250 PS 637: Performed by: EMERGENCY MEDICINE

## 2022-05-25 PROCEDURE — 87637 SARSCOV2&INF A&B&RSV AMP PRB: CPT | Performed by: EMERGENCY MEDICINE

## 2022-05-25 PROCEDURE — 81001 URINALYSIS AUTO W/SCOPE: CPT | Performed by: EMERGENCY MEDICINE

## 2022-05-25 RX ORDER — MECLIZINE HYDROCHLORIDE 25 MG/1
25 TABLET ORAL ONCE
Status: COMPLETED | OUTPATIENT
Start: 2022-05-25 | End: 2022-05-25

## 2022-05-25 RX ADMIN — MECLIZINE HYDROCHLORIDE 25 MG: 25 TABLET ORAL at 23:17

## 2022-05-25 RX ADMIN — SODIUM CHLORIDE 1000 ML: 9 INJECTION, SOLUTION INTRAVENOUS at 22:56

## 2022-05-25 ASSESSMENT — ENCOUNTER SYMPTOMS
WEAKNESS: 0
NUMBNESS: 0
COUGH: 1
FATIGUE: 1
FEVER: 0
DIZZINESS: 1
HEADACHES: 0

## 2022-05-26 VITALS
HEART RATE: 78 BPM | SYSTOLIC BLOOD PRESSURE: 144 MMHG | OXYGEN SATURATION: 95 % | WEIGHT: 115 LBS | BODY MASS INDEX: 21.73 KG/M2 | RESPIRATION RATE: 19 BRPM | DIASTOLIC BLOOD PRESSURE: 58 MMHG | TEMPERATURE: 98.4 F

## 2022-05-26 LAB
ALBUMIN UR-MCNC: NEGATIVE MG/DL
APPEARANCE UR: CLEAR
BILIRUB UR QL STRIP: NEGATIVE
COLOR UR AUTO: ABNORMAL
GLUCOSE BLDC GLUCOMTR-MCNC: 260 MG/DL (ref 70–99)
GLUCOSE UR STRIP-MCNC: >=1000 MG/DL
HGB UR QL STRIP: NEGATIVE
KETONES UR STRIP-MCNC: NEGATIVE MG/DL
LEUKOCYTE ESTERASE UR QL STRIP: NEGATIVE
NITRATE UR QL: NEGATIVE
PH UR STRIP: 6.5 [PH] (ref 5–7)
RBC URINE: <1 /HPF
SP GR UR STRIP: 1.01 (ref 1–1.03)
UROBILINOGEN UR STRIP-MCNC: NORMAL MG/DL
WBC URINE: 1 /HPF

## 2022-05-26 NOTE — ED TRIAGE NOTES
Pt is Japanese speaking - biba from home. Reports cough and was seen at urgent care on 5/20 and started on prednisone. Pt is diabetic and normally on 200 units of insulin and metformin. Reports increasing weakness and headache over past couple days. Family noted sugar in the 300s today, upon ems arrival pt's glucose 412     Triage Assessment     Row Name 05/25/22 9504       Triage Assessment (Adult)    Airway WDL WDL       Respiratory WDL    Respiratory WDL WDL       Skin Circulation/Temperature WDL    Skin Circulation/Temperature WDL WDL       Cardiac WDL    Cardiac WDL WDL

## 2022-05-26 NOTE — ED NOTES
Bed: ED01  Expected date:   Expected time:   Means of arrival:   Comments:  [facility] 77f hyperglycemia 425 glucose eta 10

## 2022-05-26 NOTE — ED PROVIDER NOTES
History   Chief Complaint:  Hyperglycemia     The history is provided by the EMS personnel, the patient and a relative (daughter). The history is limited by a language barrier. A  was used (Urdu).      Gregoria Gan is a 77 year old female with history of diabetes, CKD stage III, hypertension, and hyperlipidemia who presents via EMS from home with hyperglycemia. Per EMS report, the patient's family has noted blood sugars in the 300s today. Blood glucose was 412 en route for EMS. Here, the patient reports fatigue and dizziness that started today. She feels like the room is spinning when she ambulates, so she feels she cannot walk straight. Her daughter denies any falls. The patient denies fevers, chest pain, or headache. No numbness or weakness in the extremities. She was seen on 05/20 at Urgent Care for a month of cough that did not resolve with over the counter medications. She was discharged with a five day course of prednisone and an Albuterol inhaler. Today she states that the cough has improved. They did not test her for COVID at that visit, though she had a chest x-ray that was unremarkable (see below).     Chest XR from Urgent Care visit on 05/20/2022:  There are no acute infiltrates. The cardiac silhouette is  not enlarged. Pulmonary vasculature is unremarkable.    Review of Systems   Constitutional: Positive for fatigue. Negative for fever.   Respiratory: Positive for cough.    Cardiovascular: Negative for chest pain.   Neurological: Positive for dizziness. Negative for weakness, numbness and headaches.   All other systems reviewed and are negative.        Allergies:  No Known Allergies    Medications:  Albuterol inhaler  Norvasc  Lipitor  Amaryl  Insulin glargine  Levothyroxine  Cozaar  Glucophage  Deltasone  Desyrel    Past Medical History:     Diabetes  Hypothyroidism  Hypertension  CKD stage III  Hyperlipidemia  Osteopenia     Past Surgical History:    Left salivary gland  removal  Breast augmentation      Family History:    Diabetes, mother/sister  Hypertension, mother/sister  Hyperlipidemia, mother/sister    Social History:  Presents to ED alone via EMS. Daughter later presents.    Physical Exam     Patient Vitals for the past 24 hrs:   BP Temp Pulse Resp SpO2 Weight   05/26/22 0000 (!) 144/58 -- 78 -- 95 % --   05/25/22 2345 134/89 -- -- -- 96 % --   05/25/22 2315 (!) 163/69 -- 70 19 95 % --   05/25/22 2300 (!) 155/65 -- 80 15 95 % --   05/25/22 2238 (!) 179/73 98.4  F (36.9  C) 85 16 95 % 52.2 kg (115 lb)       Physical Exam  Constitutional:  Oriented to person, place, and time. Well appearing.  HENT:   Head:    Normocephalic.   Mouth/Throat:   Oropharynx is clear and moist.   Eyes:    EOM are normal. Pupils are equal, round, and reactive to light.   Neck:    Neck supple.   Cardiovascular:  Normal rate, regular rhythm and normal heart sounds.      Exam reveals no gallop and no friction rub.       No murmur heard.  Pulmonary/Chest:  Effort normal and breath sounds normal.      No respiratory distress. No wheezes. No rales.      No reproducible chest wall pain.  Abdominal:   Soft. No distension. No tenderness. No rebound and no guarding.   Musculoskeletal:  Normal range of motion.      2+ distal equal pulses.     No leg calf tenderness, swelling or edema.   Neurological:   Alert and oriented to person, place, and time.           Moves all 4 extremities spontaneously.     Cranial nerves 2-12 grossly normal. No meningeal signs. No ataxia. 5/5 strength and sensation intact to light touch in all 4 extremities.  Skin:    No rash noted. No pallor.      Emergency Department Course   ECG  ECG results from 05/25/22   EKG 12-lead, tracing only     Value    Systolic Blood Pressure     Diastolic Blood Pressure     Ventricular Rate 73    Atrial Rate 73    MD Interval 160    QRS Duration 78        QTc 423    P Axis 57    R AXIS 85    T Axis 78    Interpretation ECG      Sinus rhythm  Left  ventricular hypertrophy with repolarization abnormality  Abnormal ECG  When compared with ECG of 21-JUL-2011 12:02,  Nonspecific T wave abnormality, worse in Lateral leads  Confirmed by GENERATED REPORT, COMPUTER ([id number]),  Lissett Fall ([id number]) on 5/25/2022 11:25:52 PM       Imaging:  XR Chest 2 Views   Final Result   IMPRESSION:       No focal airspace disease. No pleural effusion or pneumothorax.      The cardiomediastinal silhouette is unremarkable.        Report per radiology    Laboratory:  Labs Ordered and Resulted from Time of ED Arrival to Time of ED Departure   COMPREHENSIVE METABOLIC PANEL - Abnormal       Result Value    Sodium 135      Potassium 4.0      Chloride 102      Carbon Dioxide (CO2) 24      Anion Gap 9      Urea Nitrogen 28      Creatinine 1.05 (*)     Calcium 11.3 (*)     Glucose 335 (*)     Alkaline Phosphatase 92      AST 23      ALT 51 (*)     Protein Total 8.1      Albumin 3.6      Bilirubin Total 0.8      GFR Estimate 54 (*)    ROUTINE UA WITH MICROSCOPIC REFLEX TO CULTURE - Abnormal    Color Urine Straw      Appearance Urine Clear      Glucose Urine >=1000 (*)     Bilirubin Urine Negative      Ketones Urine Negative      Specific Gravity Urine 1.010      Blood Urine Negative      pH Urine 6.5      Protein Albumin Urine Negative      Urobilinogen Urine Normal      Nitrite Urine Negative      Leukocyte Esterase Urine Negative      RBC Urine <1      WBC Urine 1     GLUCOSE BY METER - Abnormal    GLUCOSE BY METER POCT 260 (*)    TROPONIN I - Normal    Troponin I High Sensitivity 10     INFLUENZA A/B & SARS-COV2 PCR MULTIPLEX - Normal    Influenza A PCR Negative      Influenza B PCR Negative      RSV PCR Negative      SARS CoV2 PCR Negative     CBC WITH PLATELETS AND DIFFERENTIAL    WBC Count 7.4      RBC Count 4.80      Hemoglobin 15.1      Hematocrit 43.0      MCV 90      MCH 31.5      MCHC 35.1      RDW 12.4      Platelet Count 259      % Neutrophils 75      % Lymphocytes 16      %  "Monocytes 8      % Eosinophils 0      % Basophils 0      % Immature Granulocytes 1      NRBCs per 100 WBC 0      Absolute Neutrophils 5.5      Absolute Lymphocytes 1.2      Absolute Monocytes 0.6      Absolute Eosinophils 0.0      Absolute Basophils 0.0      Absolute Immature Granulocytes 0.1      Absolute NRBCs 0.0     GLUCOSE MONITOR NURSING POCT      Emergency Department Course:     Reviewed:  I reviewed nursing notes, vitals, past medical history and Care Everywhere.    Assessments:   I obtained history and examined the patient as noted above.    I rechecked the patient and obtained additional history from her daughter, who is now at bedside.  0024 I rechecked the patient and explained findings. Post fluids, she is ambulating without difficulty. Per daughter, this is her baseline. She is not unsteady and her dizziness is resolved.     Interventions:  225 NS 1L, IV Bolus   7 Antivert 25 mg, PO    Disposition:  The patient was discharged to home.     Impression & Plan     Medical Decision Makin-year-old female who is here complaining of dizziness, generalized weakness, and feeling \"off balance.\"  She has had a cough for 1 month and just finished a 5-day course of prednisone.  Known high blood sugar.  Differential includes COVID-19, pneumonia, dehydration, hyperglycemia, urinary tract infection, stroke, vertigo, near syncope or other causes.  Work-up thus far is otherwise reassuring and nonspecific.  Her sugar was initially elevated with no anion gap, no concerns for DKA.  After IV fluids, her glucose is down to 260.  Not requiring additional doses of insulin here.  I did discuss continued monitoring from home and taking her insulin per her sliding scale and dosing at home.  Upon reevaluation she is ambulating without difficulty with resolution of her dizziness.  She denies that the room was spinning and with resolution of symptoms I would doubt vertigo.  I would doubt stroke and or need for MRI " imaging here.  I believe her dizziness and general weakness is likely related to dehydration and hyperglycemia with resolution of symptoms here after IV fluids.  She is told to follow-up with her primary care doctor and to return for new or worsening dizziness, weakness, trouble speaking, any new symptoms, or concerns.    Diagnosis:    ICD-10-CM    1. Dizziness  R42    2. Hyperglycemia  R73.9    3. Weakness generalized  R53.1      Scribe Disclosure:  I, Olesya Moss, am serving as a scribe at 10:39 PM on 5/25/2022 to document services personally performed by Cali Gutiérrez MD based on my observations and the provider's statements to me.      Cali Gutiérrez MD  05/26/22 0150

## 2022-06-01 ENCOUNTER — TRANSFERRED RECORDS (OUTPATIENT)
Dept: MULTI SPECIALTY CLINIC | Facility: CLINIC | Age: 77
End: 2022-06-01

## 2022-06-01 LAB — RETINOPATHY: NORMAL

## 2022-06-02 ENCOUNTER — OFFICE VISIT (OUTPATIENT)
Dept: INTERNAL MEDICINE | Facility: CLINIC | Age: 77
End: 2022-06-02
Payer: COMMERCIAL

## 2022-06-02 VITALS
HEART RATE: 70 BPM | DIASTOLIC BLOOD PRESSURE: 52 MMHG | WEIGHT: 111.3 LBS | BODY MASS INDEX: 21.01 KG/M2 | SYSTOLIC BLOOD PRESSURE: 128 MMHG | TEMPERATURE: 98.1 F | OXYGEN SATURATION: 96 % | HEIGHT: 61 IN

## 2022-06-02 DIAGNOSIS — E83.52 HYPERCALCEMIA: Primary | ICD-10-CM

## 2022-06-02 DIAGNOSIS — R80.9 TYPE 2 DIABETES MELLITUS WITH MICROALBUMINURIA, WITHOUT LONG-TERM CURRENT USE OF INSULIN (H): ICD-10-CM

## 2022-06-02 DIAGNOSIS — E11.29 TYPE 2 DIABETES MELLITUS WITH MICROALBUMINURIA, WITHOUT LONG-TERM CURRENT USE OF INSULIN (H): ICD-10-CM

## 2022-06-02 DIAGNOSIS — E06.3 AUTOIMMUNE HYPOTHYROIDISM: ICD-10-CM

## 2022-06-02 DIAGNOSIS — Z23 HIGH PRIORITY FOR 2019-NCOV VACCINE: ICD-10-CM

## 2022-06-02 DIAGNOSIS — E21.3 HYPERPARATHYROIDISM (H): ICD-10-CM

## 2022-06-02 LAB
ANION GAP SERPL CALCULATED.3IONS-SCNC: 7 MMOL/L (ref 3–14)
BUN SERPL-MCNC: 17 MG/DL (ref 7–30)
CALCIUM SERPL-MCNC: 11.4 MG/DL (ref 8.5–10.1)
CHLORIDE BLD-SCNC: 109 MMOL/L (ref 94–109)
CO2 SERPL-SCNC: 26 MMOL/L (ref 20–32)
CREAT SERPL-MCNC: 1.25 MG/DL (ref 0.52–1.04)
GFR SERPL CREATININE-BSD FRML MDRD: 44 ML/MIN/1.73M2
GLUCOSE BLD-MCNC: 159 MG/DL (ref 70–99)
POTASSIUM BLD-SCNC: 3.7 MMOL/L (ref 3.4–5.3)
PTH-INTACT SERPL-MCNC: 178 PG/ML (ref 18–80)
SODIUM SERPL-SCNC: 142 MMOL/L (ref 133–144)
TSH SERPL DL<=0.005 MIU/L-ACNC: 1.07 MU/L (ref 0.4–4)

## 2022-06-02 PROCEDURE — 91305 COVID-19,PF,PFIZER (12+ YRS): CPT | Performed by: INTERNAL MEDICINE

## 2022-06-02 PROCEDURE — 36415 COLL VENOUS BLD VENIPUNCTURE: CPT | Performed by: INTERNAL MEDICINE

## 2022-06-02 PROCEDURE — 99214 OFFICE O/P EST MOD 30 MIN: CPT | Mod: 25 | Performed by: INTERNAL MEDICINE

## 2022-06-02 PROCEDURE — 83970 ASSAY OF PARATHORMONE: CPT | Performed by: INTERNAL MEDICINE

## 2022-06-02 PROCEDURE — 80048 BASIC METABOLIC PNL TOTAL CA: CPT | Performed by: INTERNAL MEDICINE

## 2022-06-02 PROCEDURE — 0054A COVID-19,PF,PFIZER (12+ YRS): CPT | Performed by: INTERNAL MEDICINE

## 2022-06-02 PROCEDURE — 84443 ASSAY THYROID STIM HORMONE: CPT | Performed by: INTERNAL MEDICINE

## 2022-06-02 RX ORDER — VITAMIN B COMPLEX
1 TABLET ORAL DAILY
COMMUNITY
Start: 2022-06-02 | End: 2023-06-27

## 2022-06-02 RX ORDER — LEVOTHYROXINE SODIUM 75 UG/1
75 TABLET ORAL DAILY
Qty: 90 TABLET | Refills: 1 | Status: SHIPPED | OUTPATIENT
Start: 2022-06-02 | End: 2023-02-24

## 2022-06-02 NOTE — PROGRESS NOTES
"  Assessment & Plan     Hypercalcemia  Hyperparathyroidism (H)  eval for parathyroid adenoma  Her Ca level may have been contributing to her sx - recheck this. Consider using bisphosphonate if needed -  - NM Parathyroid Planar Imaging Single Isotope; Future      Type 2 diabetes mellitus with microalbuminuria, without long-term current use of insulin (H)  # reviewed in clinic much better than in ER. Focus on diet.   - OPTOMETRY REFERRAL; Future  - Basic metabolic panel  (Ca, Cl, CO2, Creat, Gluc, K, Na, BUN); Future  - Basic metabolic panel  (Ca, Cl, CO2, Creat, Gluc, K, Na, BUN)    Autoimmune hypothyroidism  - TSH WITH FREE T4 REFLEX; Future  - levothyroxine (SYNTHROID/LEVOTHROID) 75 MCG tablet; Take 1 tablet (75 mcg) by mouth daily New sig and tab size  - TSH WITH FREE T4 REFLEX    High priority for 2019-nCoV vaccine  - COVID-19,PF,PFIZER (12+ Yrs GRAY LABEL)                 Return in about 3 months (around 9/2/2022) for Follow up visit.    Braxton Boone MD  [facility]   Gregoria Coombs is a 77 year old who presents for the following health issues     HPI     ED/UC Followup:    Facility:  [facility]  Date of visit: 05/25/2022  Reason for visit: hyperglycemia  Current Status: improved           Review of Systems   Constitutional, HEENT, cardiovascular, pulmonary, gi and gu systems are negative, except as otherwise noted.      Objective    /52   Pulse 70   Temp 98.1  F (36.7  C) (Temporal)   Ht 1.549 m (5' 1\")   Wt 50.5 kg (111 lb 4.8 oz)   SpO2 96%   BMI 21.03 kg/m    Body mass index is 21.03 kg/m .  Physical Exam   GENERAL: healthy, alert and no distress  CV: regular rate and rhythm, normal S1 S2, no S3 or S4, no murmur, click or rub, no peripheral edema and peripheral pulses strong  NEURO: Normal strength and tone, mentation intact and speech normal                "

## 2022-06-08 ENCOUNTER — APPOINTMENT (OUTPATIENT)
Dept: INTERPRETER SERVICES | Facility: CLINIC | Age: 77
End: 2022-06-08
Payer: COMMERCIAL

## 2022-06-09 RX ORDER — LEVOTHYROXINE SODIUM 88 UG/1
TABLET ORAL
Qty: 30 TABLET | Refills: 0 | OUTPATIENT
Start: 2022-06-09

## 2022-06-09 NOTE — TELEPHONE ENCOUNTER
This is a duplicate refill request, that has been refused to the pharmacy.   mImanuel Lainez RN  [facility] Triage Nurse

## 2022-06-15 ENCOUNTER — APPOINTMENT (OUTPATIENT)
Dept: INTERPRETER SERVICES | Facility: CLINIC | Age: 77
End: 2022-06-15
Payer: COMMERCIAL

## 2022-10-21 DIAGNOSIS — R80.9 TYPE 2 DIABETES MELLITUS WITH MICROALBUMINURIA, WITH LONG-TERM CURRENT USE OF INSULIN (H): ICD-10-CM

## 2022-10-21 DIAGNOSIS — Z79.4 TYPE 2 DIABETES MELLITUS WITH MICROALBUMINURIA, WITH LONG-TERM CURRENT USE OF INSULIN (H): ICD-10-CM

## 2022-10-21 DIAGNOSIS — E11.29 TYPE 2 DIABETES MELLITUS WITH MICROALBUMINURIA, WITH LONG-TERM CURRENT USE OF INSULIN (H): ICD-10-CM

## 2022-10-25 RX ORDER — PEN NEEDLE, DIABETIC 29 G X1/2"
NEEDLE, DISPOSABLE MISCELLANEOUS
Qty: 100 EACH | Refills: 0 | Status: SHIPPED | OUTPATIENT
Start: 2022-10-25 | End: 2023-02-03

## 2022-10-25 RX ORDER — INSULIN GLARGINE 100 [IU]/ML
INJECTION, SOLUTION SUBCUTANEOUS
Qty: 30 ML | Refills: 0 | Status: SHIPPED | OUTPATIENT
Start: 2022-10-25 | End: 2023-04-20

## 2022-10-25 NOTE — TELEPHONE ENCOUNTER
Routing refill request to provider for review/approval because:  Labs out of range:    Hemoglobin A1C   Date Value Ref Range Status   03/04/2022 7.0 (H) 0.0 - 5.6 % Final     Comment:     Normal <5.7%   Prediabetes 5.7-6.4%    Diabetes 6.5% or higher     Note: Adopted from ADA consensus guidelines.   05/28/2021 8.5 (H) 0 - 5.6 % Final     Comment:     Normal <5.7% Prediabetes 5.7-6.4%  Diabetes 6.5% or higher - adopted from ADA   consensus guidelines.         Nayla Cai RN

## 2022-10-28 ENCOUNTER — OFFICE VISIT (OUTPATIENT)
Dept: INTERNAL MEDICINE | Facility: CLINIC | Age: 77
End: 2022-10-28
Payer: COMMERCIAL

## 2022-10-28 VITALS
SYSTOLIC BLOOD PRESSURE: 124 MMHG | HEIGHT: 61 IN | WEIGHT: 116.7 LBS | HEART RATE: 70 BPM | TEMPERATURE: 97.9 F | DIASTOLIC BLOOD PRESSURE: 54 MMHG | BODY MASS INDEX: 22.03 KG/M2 | OXYGEN SATURATION: 95 %

## 2022-10-28 DIAGNOSIS — E11.29 TYPE 2 DIABETES MELLITUS WITH MICROALBUMINURIA, WITHOUT LONG-TERM CURRENT USE OF INSULIN (H): Primary | ICD-10-CM

## 2022-10-28 DIAGNOSIS — E21.3 HYPERPARATHYROIDISM (H): ICD-10-CM

## 2022-10-28 DIAGNOSIS — E06.3 AUTOIMMUNE HYPOTHYROIDISM: ICD-10-CM

## 2022-10-28 DIAGNOSIS — R80.9 TYPE 2 DIABETES MELLITUS WITH MICROALBUMINURIA, WITHOUT LONG-TERM CURRENT USE OF INSULIN (H): Primary | ICD-10-CM

## 2022-10-28 LAB
ANION GAP SERPL CALCULATED.3IONS-SCNC: 8 MMOL/L (ref 3–14)
BUN SERPL-MCNC: 16 MG/DL (ref 7–30)
CALCIUM SERPL-MCNC: 11.1 MG/DL (ref 8.5–10.1)
CHLORIDE BLD-SCNC: 107 MMOL/L (ref 94–109)
CO2 SERPL-SCNC: 24 MMOL/L (ref 20–32)
CREAT SERPL-MCNC: 1.19 MG/DL (ref 0.52–1.04)
GFR SERPL CREATININE-BSD FRML MDRD: 47 ML/MIN/1.73M2
GLUCOSE BLD-MCNC: 146 MG/DL (ref 70–99)
HBA1C MFR BLD: 8.4 % (ref 0–5.6)
POTASSIUM BLD-SCNC: 4.1 MMOL/L (ref 3.4–5.3)
PTH-INTACT SERPL-MCNC: 106 PG/ML (ref 15–65)
SODIUM SERPL-SCNC: 139 MMOL/L (ref 133–144)
T4 FREE SERPL-MCNC: 1.31 NG/DL (ref 0.76–1.46)
TSH SERPL DL<=0.005 MIU/L-ACNC: 6.61 MU/L (ref 0.4–4)

## 2022-10-28 PROCEDURE — 99214 OFFICE O/P EST MOD 30 MIN: CPT | Performed by: INTERNAL MEDICINE

## 2022-10-28 PROCEDURE — 84443 ASSAY THYROID STIM HORMONE: CPT | Performed by: INTERNAL MEDICINE

## 2022-10-28 PROCEDURE — 83970 ASSAY OF PARATHORMONE: CPT | Performed by: INTERNAL MEDICINE

## 2022-10-28 PROCEDURE — 84439 ASSAY OF FREE THYROXINE: CPT | Performed by: INTERNAL MEDICINE

## 2022-10-28 PROCEDURE — 36415 COLL VENOUS BLD VENIPUNCTURE: CPT | Performed by: INTERNAL MEDICINE

## 2022-10-28 PROCEDURE — 80048 BASIC METABOLIC PNL TOTAL CA: CPT | Performed by: INTERNAL MEDICINE

## 2022-10-28 PROCEDURE — 83036 HEMOGLOBIN GLYCOSYLATED A1C: CPT | Performed by: INTERNAL MEDICINE

## 2022-10-28 NOTE — PROGRESS NOTES
Assessment & Plan     Type 2 diabetes mellitus with microalbuminuria, without long-term current use of insulin (H)  Discontinue glimepride- simplify regimen   - HEMOGLOBIN A1C; Future  - Basic metabolic panel  (Ca, Cl, CO2, Creat, Gluc, K, Na, BUN); Future  - HEMOGLOBIN A1C  - Basic metabolic panel  (Ca, Cl, CO2, Creat, Gluc, K, Na, BUN)    Hyperparathyroidism (H)  Not interested in eval for adenoma or surgery  Does have indications for surgery - recheck labs , consider medical rx and indications for rx.   - Parathyroid Hormone Intact; Future  - Basic metabolic panel  (Ca, Cl, CO2, Creat, Gluc, K, Na, BUN); Future  - Parathyroid Hormone Intact  - Basic metabolic panel  (Ca, Cl, CO2, Creat, Gluc, K, Na, BUN)    Autoimmune hypothyroidism  - TSH WITH FREE T4 REFLEX; Future  - TSH with free T4 reflex                 Return in about 6 months (around 4/28/2023) for Annual Wellness Visit.    Braxton Boone MD  [facility]   Gregoria Coombs is a 77 year old accompanied by her spouse, presenting for the following health issues:  Diabetes      HPI     Diabetes Follow-up    How often are you checking your blood sugar? One time daily  What time of day are you checking your blood sugars (select all that apply)?  Before meals  Have you had any blood sugars above 200?  No  Have you had any blood sugars below 70?  No    What symptoms do you notice when your blood sugar is low?  None    What concerns do you have today about your diabetes? None     Do you have any of these symptoms? (Select all that apply)  No numbness or tingling in feet.  No redness, sores or blisters on feet.  No complaints of excessive thirst.  No reports of blurry vision.  No significant changes to weight.    Have you had a diabetic eye exam in the last 12 months? Yes- Date of last eye exam: 06/2022,  Location: [facility]        BP Readings from Last 2 Encounters:   10/28/22 124/54   06/02/22 128/52     Hemoglobin A1C  "(%)   Date Value   03/04/2022 7.0 (H)   09/03/2021 7.4 (H)   05/28/2021 8.5 (H)   02/24/2021 8.3 (H)     LDL Cholesterol Calculated (mg/dL)   Date Value   03/04/2022 32   02/24/2021 53   05/15/2020 40                 Hypothyroidism Follow-up      Since last visit, patient describes the following symptoms: Weight stable, no hair loss, no skin changes, no constipation, no loose stools            Review of Systems         Objective    /54   Pulse 70   Temp 97.9  F (36.6  C) (Temporal)   Ht 1.549 m (5' 1\")   Wt 52.9 kg (116 lb 11.2 oz)   SpO2 95%   BMI 22.05 kg/m    Body mass index is 22.05 kg/m .  Physical Exam   GENERAL: healthy, alert and no distress  HENT: normal cephalic/atraumatic  NECK: no adenopathy, no asymmetry, masses, or scars and thyroid normal to palpation  RESP: lungs clear to auscultation - no rales, rhonchi or wheezes  CV: regular rate and rhythm, normal S1 S2, no S3 or S4, no murmur, click or rub, no peripheral edema and peripheral pulses strong  SKIN: no suspicious lesions or rashes                "

## 2022-11-05 DIAGNOSIS — E78.5 HYPERLIPIDEMIA LDL GOAL <100: ICD-10-CM

## 2022-11-07 ENCOUNTER — NURSE TRIAGE (OUTPATIENT)
Dept: NURSING | Facility: CLINIC | Age: 77
End: 2022-11-07

## 2022-11-07 ENCOUNTER — NURSE TRIAGE (OUTPATIENT)
Dept: INTERNAL MEDICINE | Facility: CLINIC | Age: 77
End: 2022-11-07

## 2022-11-07 RX ORDER — ATORVASTATIN CALCIUM 20 MG/1
20 TABLET, FILM COATED ORAL DAILY
Qty: 90 TABLET | Refills: 1 | Status: SHIPPED | OUTPATIENT
Start: 2022-11-07 | End: 2023-04-28

## 2022-11-07 NOTE — TELEPHONE ENCOUNTER
Pt had elevated BG about an hour ago. It was 258 and this is higher than her normal. She has type 2 diabetes and is on Lantus and Metformin which she is taking as prescribed.  states she used to be on glipizide but this was stopped by PCP. She denies any other symptoms but notes some slight dizziness. Did triage symptoms and per protocol home care advised. Discussed this with family and they stated understanding and agreement. They will continue to monitor. They are aware with increasing number, any new or worsening symptoms they will call clinic back. They are aware clinic is a 24 hour nurse line. They had no further questions at this time.     Reason for Disposition    Blood glucose 240 - 300 mg/dL (13.3 - 16.7 mmol/L)    Additional Information    Negative: Unconscious or difficult to awaken    Negative: Acting confused (e.g., disoriented, slurred speech)    Negative: Very weak (can't stand)    Negative: Sounds like a life-threatening emergency to the triager    Negative: Vomiting and signs of dehydration (e.g., very dry mouth, lightheaded, dark urine)    Negative: Blood glucose > 240 mg/dL (13.3 mmol/L) and rapid breathing    Negative: Blood glucose > 500 mg/dL (27.8 mmol/L)    Negative: Blood glucose > 240 mg/dL (13.3 mmol/L) AND urine ketones moderate-large (or more than 1+)    Negative: Blood glucose > 240 mg/dL (13.3 mmol/L) and blood ketones > 1.4 mmol/L    Negative: Blood glucose > 240 mg/dL (13.3 mmol/L) AND vomiting AND unable to check for ketones (in blood or urine)    Negative: Vomiting lasting > 4 hours    Negative: Patient sounds very sick or weak to the triager    Negative: Fever > 100.4 F (38.0 C)    Negative: Caller has URGENT medication or insulin pump question and triager unable to answer question    Negative: Blood glucose > 400 mg/dL (22.2 mmol/L)    Negative: Blood glucose > 300 mg/dL (16.7 mmol/L) AND two or more times in a row    Negative: Urine ketones moderate - large (or blood  "ketones > 1.4 mmol/L)    Negative: Caller has NON-URGENT medication question about med that PCP prescribed and triager unable to answer question    Negative: New-onset diabetes mellitus suspected (e.g., frequent urination, weak, weight loss)    Negative: Symptoms of high blood sugar (e.g., frequent urination, weak, weight loss) and not able to test blood glucose    Negative: Patient wants to be seen    Answer Assessment - Initial Assessment Questions  1. BLOOD GLUCOSE: \"What is your blood glucose level?\"         1 hour ago it was 258 (this was after lunch)    2. ONSET: \"When did you check the blood glucose?\"        1 hour ago    3. USUAL RANGE: \"What is your glucose level usually?\" (e.g., usual fasting morning value, usual evening value)        Usually it is low 100's (before breakfast). Before it was below 100.     4. KETONES: \"Do you check for ketones (urine or blood test strips)?\" If yes, ask: \"What does the test show now?\"         No    5. TYPE 1 or 2:  \"Do you know what type of diabetes you have?\"  (e.g., Type 1, Type 2, Gestational; doesn't know)         Type 2 diabetes     6. INSULIN: \"Do you take insulin?\" \"What type of insulin(s) do you use? What is the mode of delivery? (syringe, pen; injection or pump)?\"         Yes, Lantus     7. DIABETES PILLS: \"Do you take any pills for your diabetes?\" If yes, ask: \"Have you missed taking any pills recently?\"        Yes, Metformin, Glimepiride was told to stop by PCP     8. OTHER SYMPTOMS: \"Do you have any symptoms?\" (e.g., fever, frequent urination, difficulty breathing, dizziness, weakness, vomiting)        Dizziness     9. PREGNANCY: \"Is there any chance you are pregnant?\" \"When was your last menstrual period?\"        NA    Protocols used: DIABETES - HIGH BLOOD SUGAR-A-OH    HOME CARE:   * You should be able to treat this at home.      INSULIN - DO NOT STOP TAKING IT:  * If you are supposed to be using insulin, do not stop using it.  * During an illness you may need " even more insulin than usual.      TREATMENT - LIQUIDS:  * Drink at least one glass (8 oz; 240 ml) of water per hour for the next 4 hours. Reason: Adequate hydration will help lower blood sugar.  * Try to drink 6 to 8 glasses of water each day.      CONTINUE INSULIN:  * Follow the insulin dosing plan recommended by your doctor (or NP/PA).  * IF your doctor has given you instructions to take extra rapid-acting (e.g., lispro, aspart) or short acting (regular) insulin when your blood sugar is high, give yourself the insulin dose your doctor has recommended.      CONTINUE DIABETES PILLS:  * Continue taking your diabetes pills.      CALL BACK IF:  * Blood glucose over 300 mg/dL (16.7 mmol/L), two or more times in a row.  * Urine ketones become moderate or large (or more than 1+); if you check blood ketones, blood ketone test is over 1.4 mmol/L  * Vomiting lasting over 4 hours or unable to drink any fluids  * Rapid breathing occurs  * You have more questions  * You become worse        Patient/Caregiver understands and will follow care advice? Yes, plans to follow advice     Inocencia LANGLEY RN  [facility]

## 2022-11-07 NOTE — TELEPHONE ENCOUNTER
Consent to communicate on file for .    patient has a blood sugar of 258 right now.    Patient is dizzy.  Blood pressure is 129/61.    Phone disconnected during triage- RN attempted to call  back. No answer.    Jo Malone RN on 11/7/2022 at 5:41 PM      Additional Information    Negative: Unconscious or difficult to awaken    Negative: Acting confused (e.g., disoriented, slurred speech)    Negative: Very weak (e.g., can't stand)    Negative: Sounds like a life-threatening emergency to the triager    Negative: [1] Vomiting AND [2] signs of dehydration (e.g., very dry mouth, lightheaded, dark urine)    Negative: [1] Blood glucose > 240 mg/dL (13.3 mmol/L) AND [2] rapid breathing    Negative: Blood glucose > 500 mg/dL (27.8 mmol/L)    Negative: [1] Blood glucose > 240 mg/dL (13.3 mmol/L) AND [2] urine ketones moderate-large (or more than 1+)    Negative: [1] Blood glucose > 240 mg/dL (13.3 mmol/L) AND [2] blood ketones > 1.4 mmol/L    Negative: [1] Blood glucose > 240 mg/dL (13.3 mmol/L) AND [2] vomiting AND [3] unable to check for ketones (in blood or urine)    Negative: [1] New-onset diabetes suspected (e.g., frequent urination, weak, weight loss) AND [2] vomiting or rapid breathing    Negative: Vomiting lasts > 4 hours    Negative: Patient sounds very sick or weak to the triager    Negative: Fever > 100.4 F (38.0 C)    Negative: Blood glucose > 400 mg/dL (22.2 mmol/L)    Negative: [1] Blood glucose > 300 mg/dL (16.7 mmol/L) AND [2] two or more times in a row    Negative: Urine ketones moderate - large (or blood ketones > 1.4 mmol/L)    Negative: [1] Caller has URGENT medication or insulin pump question AND [2] triager unable to answer question    Negative: [1] Symptoms of high blood sugar (e.g., frequent urination, weak, weight loss) AND [2] not able to test blood glucose    Negative: New-onset diabetes suspected (e.g., frequent urination, weakness, weight loss)    Negative: [1] Caller has  NON-URGENT medication or insulin pump question AND [2] triager unable to answer question    Negative: [1] Blood glucose > 300 mg/dL (16.7 mmol/L) AND [2] uses insulin (e.g., insulin-dependent, all people with type 1 diabetes)    Protocols used: DIABETES - HIGH BLOOD SUGAR-A-AH

## 2022-11-08 NOTE — TELEPHONE ENCOUNTER
Patient Contact    Attempt # 1 to An, [phone].    Was call answered?  No.  Left message on voicemail with information to call me back.

## 2022-11-08 NOTE — TELEPHONE ENCOUNTER
Have  increase her basal insulin as follows:  Start with 20 units of Lantus she is currently on    Average preceding 5 days of fasting morning glucose  Titrate based on these AM glucose readings:  If AM fasting glucose is <70 decrease next dose by 2 units                           no change in Lantus dose                          121-150 increase by 2 units                          151-175 increase by 4 units                          >176 increase by 6 units

## 2022-11-09 ENCOUNTER — TELEPHONE (OUTPATIENT)
Dept: NURSING | Facility: CLINIC | Age: 77
End: 2022-11-09

## 2022-11-09 NOTE — TELEPHONE ENCOUNTER
Pt's  called back the clinic, provider's message given. He has been giving Lantus at lunchtime and not in the morning. Asking if this would make a difference, is morning better for her?     BP 2 days ago was 129/61, BG yesterday before breakfast 115 and this morning 93. Dizziness slightly improved. Patient still complains of slight dizziness. Patient drinks 1/2 liter of water a day several sips every half an hour and soup for lunch, no other liquids throughout the day. Advised to try increasing water to a liter a day and try sips of Pedialyte when dizzy for one week and see if this this an issue with hydration. Requested to call back in one week or sooner if problem does not resolve or get worse.     GIANNI sent to Dr. Boone.

## 2022-11-09 NOTE — TELEPHONE ENCOUNTER
"AN called (c2c on file)   Provider message relayed;  \"Ok to give lantus at lunch but should follow directions on titration basing dosing on fasting glucose not lunch glucose.\"    Verbalizes understanding and agrees to plan of care. Will contact clinic or go to UC if symptoms worsen or do not improve before next appointment.   "

## 2022-11-09 NOTE — TELEPHONE ENCOUNTER
Patient Contact    Attempt # 1    Was call answered?  No.  Left message on voicemail with information to call me back.    Upon call back: please relay message from the provider.     Madison Pichardo RN

## 2022-11-09 NOTE — TELEPHONE ENCOUNTER
Patient's spouse, An returning clinic call regarding blood sugar and dizziness. Transferring call to clinic RN.    Nadiya Aguayo RN on 11/9/2022 at 1:07 PM

## 2022-11-09 NOTE — TELEPHONE ENCOUNTER
Patient Contact    Attempt # 2    Was call answered?  No.  Left message on voicemail with information to call me back, with the help of a Maltese .    Madison Pichardo RN

## 2022-11-09 NOTE — TELEPHONE ENCOUNTER
Ok to give lantus at lunch but should follow directions on titration basing dosing on fasting glucose not lunch glucose.

## 2022-11-30 ENCOUNTER — TELEPHONE (OUTPATIENT)
Dept: INTERNAL MEDICINE | Facility: CLINIC | Age: 77
End: 2022-11-30

## 2022-11-30 ENCOUNTER — OFFICE VISIT (OUTPATIENT)
Dept: URGENT CARE | Facility: URGENT CARE | Age: 77
End: 2022-11-30
Payer: COMMERCIAL

## 2022-11-30 VITALS
BODY MASS INDEX: 21.92 KG/M2 | SYSTOLIC BLOOD PRESSURE: 158 MMHG | DIASTOLIC BLOOD PRESSURE: 72 MMHG | WEIGHT: 116 LBS | TEMPERATURE: 98.6 F | RESPIRATION RATE: 22 BRPM | OXYGEN SATURATION: 96 % | HEART RATE: 80 BPM

## 2022-11-30 DIAGNOSIS — J06.9 UPPER RESPIRATORY TRACT INFECTION, UNSPECIFIED TYPE: ICD-10-CM

## 2022-11-30 DIAGNOSIS — J18.9 PNEUMONIA OF LEFT LOWER LOBE DUE TO INFECTIOUS ORGANISM: Primary | ICD-10-CM

## 2022-11-30 LAB
FLUAV AG SPEC QL IA: NEGATIVE
FLUBV AG SPEC QL IA: NEGATIVE

## 2022-11-30 PROCEDURE — U0005 INFEC AGEN DETEC AMPLI PROBE: HCPCS | Performed by: FAMILY MEDICINE

## 2022-11-30 PROCEDURE — U0003 INFECTIOUS AGENT DETECTION BY NUCLEIC ACID (DNA OR RNA); SEVERE ACUTE RESPIRATORY SYNDROME CORONAVIRUS 2 (SARS-COV-2) (CORONAVIRUS DISEASE [COVID-19]), AMPLIFIED PROBE TECHNIQUE, MAKING USE OF HIGH THROUGHPUT TECHNOLOGIES AS DESCRIBED BY CMS-2020-01-R: HCPCS | Performed by: FAMILY MEDICINE

## 2022-11-30 PROCEDURE — 99214 OFFICE O/P EST MOD 30 MIN: CPT | Mod: CS | Performed by: FAMILY MEDICINE

## 2022-11-30 PROCEDURE — 87804 INFLUENZA ASSAY W/OPTIC: CPT | Performed by: FAMILY MEDICINE

## 2022-11-30 RX ORDER — DOXYCYCLINE 100 MG/1
100 TABLET ORAL 2 TIMES DAILY
Qty: 20 TABLET | Refills: 0 | Status: SHIPPED | OUTPATIENT
Start: 2022-11-30 | End: 2022-12-10

## 2022-11-30 NOTE — TELEPHONE ENCOUNTER
Patient's spouse (C2C) calling on behalf of patient to provide diabetic update for 11/7 Triage Enc.   Spouse asking if patient should be seen for diabetic follow-up?    Pt has been doing well since 11/7/22 and is checking fasting BG in AMs.     11/21   11/22   11/23   11/24   11/25   11/26 BG 99  11/27   11/28   11/29        Can we leave a detailed message on this number? YES  Phone number patient can be reached at: Home number on file [phone] (home)    Nayla Cai, RN  [facility] Triage

## 2022-11-30 NOTE — PROGRESS NOTES
SUBJECTIVE: Gregoria Gan is a 77 year old female presenting with a chief complaint of cough .  Onset of symptoms was 3 day(s) ago.  Course of illness is worsening.    Predisposing factors include None.    Past Medical History:   Diagnosis Date     CKD (chronic kidney disease) stage 3, GFR 30-59 ml/min (H)      HTN (hypertension), benign      Other and unspecified hyperlipidemia      Type 2 diabetes, HbA1c goal < 7% (H)      Unspecified hypothyroidism      No Known Allergies  Social History     Tobacco Use     Smoking status: Never     Smokeless tobacco: Never   Substance Use Topics     Alcohol use: No     Alcohol/week: 0.0 standard drinks       ROS:  SKIN: no rash  GI: no vomiting    OBJECTIVE:  BP (!) 158/72   Pulse 80   Temp 98.6  F (37  C)   Resp 22   Wt 52.6 kg (116 lb)   SpO2 96%   BMI 21.92 kg/m  GENERAL APPEARANCE: healthy, alert and no distress  EYES: EOMI,  PERRL, conjunctiva clear  HENT: ear canals and TM's normal.  Nose and mouth without ulcers, erythema or lesions  RESP: rhonchi L lower posterior  SKIN: no suspicious lesions or rashes      ICD-10-CM    1. Pneumonia of left lower lobe due to infectious organism  J18.9 doxycycline monohydrate (ADOXA) 100 MG tablet      2. Upper respiratory tract infection, unspecified type  J06.9           Fluids/Rest, f/u if worse/not any better

## 2022-12-01 LAB — SARS-COV-2 RNA RESP QL NAA+PROBE: NEGATIVE

## 2022-12-06 ENCOUNTER — TELEPHONE (OUTPATIENT)
Dept: INTERNAL MEDICINE | Facility: CLINIC | Age: 77
End: 2022-12-06

## 2022-12-06 NOTE — TELEPHONE ENCOUNTER
Patient's spouse (on C2C) calling to request appt be scheduled for patient. RN helped patient get scheduled for appt with PCP. Patient's spouse had no additional questions or updates.

## 2022-12-16 NOTE — TELEPHONE ENCOUNTER
Put her into one of the same day/next day appts on Thursday 12/22 afternoon.    - I'd to f/u on her urgent care visit, diabetes and lytes

## 2023-01-29 ENCOUNTER — NURSE TRIAGE (OUTPATIENT)
Dept: NURSING | Facility: CLINIC | Age: 78
End: 2023-01-29
Payer: COMMERCIAL

## 2023-01-29 ENCOUNTER — TELEPHONE (OUTPATIENT)
Dept: NURSING | Facility: CLINIC | Age: 78
End: 2023-01-29
Payer: COMMERCIAL

## 2023-01-29 NOTE — TELEPHONE ENCOUNTER
Nurse Triage SBAR    Is this a 2nd Level Triage? No    Situation/Background: Spouse calling that patient is positive for Covid 19. Patient has weakness, feels hot/cold, fatigue, sore throat, runny nose, cough with phlegm. Symptoms began Thursday, 1/26/23.   Ronald, [id number], Italian  was brought into the call.    CTC to speak with spouse on file.    Assessment:   Lack of appetite  Is able to walk to bathroom but feels very weak and moves very slowly, is able to move in bed and chair but feels very weak, per spouse  Denies SOB at rest or while walking  Temp 99.2 Ox 88%, recheck 89%    Recommendation: Per disposition, Go to ED now. Spouse stated he would call 911. Advised patient to call back with any new or worsening symptoms. Patient verbalized understanding and agrees with plan.    Protocol Recommended Disposition: Call 911    Bharati Riddle RN on 1/29/2023 at 10:15 AM  [facility]    Reason for Disposition    Oxygen level (e.g., pulse oximetry) 90 percent or lower    Additional Information    Negative: SEVERE difficulty breathing (e.g., struggling for each breath, speaks in single words)    Negative: Difficult to awaken or acting confused (e.g., disoriented, slurred speech)    Negative: Bluish (or gray) lips or face now    Negative: Shock suspected (e.g., cold/pale/clammy skin, too weak to stand, low BP, rapid pulse)    Negative: Sounds like a life-threatening emergency to the triager    Negative: [1] Diagnosed or suspected COVID-19 AND [2] symptoms lasting 3 or more weeks    Negative: [1] COVID-19 exposure AND [2] no symptoms    Negative: COVID-19 vaccine reaction suspected (e.g., fever, headache, muscle aches) occurring 1 to 3 days after getting vaccine    Negative: COVID-19 vaccine, questions about    Negative: [1] Adult with possible COVID-19 symptoms AND [2] triager concerned about severity of symptoms or other causes    Negative: COVID-19 and breastfeeding, questions about     Negative: [1] Lives with someone known to have influenza (flu test positive) AND [2] flu-like symptoms (e.g., cough, runny nose, sore throat, SOB; with or without fever)    Negative: SEVERE or constant chest pain or pressure  (Exception: Mild central chest pain, present only when coughing.)    Negative: MODERATE difficulty breathing (e.g., speaks in phrases, SOB even at rest, pulse 100-120)    Negative: [1] Headache AND [2] stiff neck (can't touch chin to chest)    Protocols used: CORONAVIRUS (COVID-19) DIAGNOSED OR [id number]

## 2023-01-29 NOTE — TELEPHONE ENCOUNTER
COVID Positive/Requesting COVID treatment    Patient is positive for COVID and requesting treatment options.    Date of positive COVID test (PCR or at home)? 01/28/2023  Current COVID symptoms: cough, fatigue and sore throat  Date COVID symptoms began: 01/26/2023    Message should be routed to clinic RN pool. Best phone number to use for call back: [phone]    Patient's  calling back, states that he did call 911 for his wife. They checked her oxygen and it was 98%, they ran an EKG which was normal and they listened to her lungs and stated they were clear. They did not see a need to take her to the ED and the patient is feeling better. Patient's  would like her to start Paxlovid.     OSBALDO SMITH RN

## 2023-01-30 NOTE — TELEPHONE ENCOUNTER
Patient Contact    Attempt # 2 with Turkmen Interpretor    Was call answered?  No.  Left message on voicemail with information to call me back.    On call back, please determine patient's eligibility for Paxlovid treatment.

## 2023-01-30 NOTE — TELEPHONE ENCOUNTER
Patient Contact    Attempt # 1 with Slovenian Interpretor.     Was call answered?  No. Left message on voicemail with information to call me back.    On call back, please determine patient's eligibility for Paxlovid treatment.

## 2023-01-31 ENCOUNTER — OFFICE VISIT (OUTPATIENT)
Dept: INTERNAL MEDICINE | Facility: CLINIC | Age: 78
End: 2023-01-31
Payer: COMMERCIAL

## 2023-01-31 VITALS — DIASTOLIC BLOOD PRESSURE: 61 MMHG | HEART RATE: 68 BPM | SYSTOLIC BLOOD PRESSURE: 130 MMHG | OXYGEN SATURATION: 94 %

## 2023-01-31 DIAGNOSIS — U07.1 INFECTION DUE TO 2019 NOVEL CORONAVIRUS: Primary | ICD-10-CM

## 2023-01-31 DIAGNOSIS — R80.9 TYPE 2 DIABETES MELLITUS WITH MICROALBUMINURIA, WITHOUT LONG-TERM CURRENT USE OF INSULIN (H): ICD-10-CM

## 2023-01-31 DIAGNOSIS — I12.9 BENIGN HYPERTENSION WITH CKD (CHRONIC KIDNEY DISEASE) STAGE III (H): ICD-10-CM

## 2023-01-31 DIAGNOSIS — N18.30 BENIGN HYPERTENSION WITH CKD (CHRONIC KIDNEY DISEASE) STAGE III (H): ICD-10-CM

## 2023-01-31 DIAGNOSIS — E11.29 TYPE 2 DIABETES MELLITUS WITH MICROALBUMINURIA, WITHOUT LONG-TERM CURRENT USE OF INSULIN (H): ICD-10-CM

## 2023-01-31 PROCEDURE — 99214 OFFICE O/P EST MOD 30 MIN: CPT | Mod: CS | Performed by: INTERNAL MEDICINE

## 2023-01-31 NOTE — PROGRESS NOTES
Assessment & Plan     Infection due to 2019 novel coronavirus  Beyond 5 d rx window for paxlovid. Feeling better- cont monitor    Type 2 diabetes mellitus with microalbuminuria, without long-term current use of insulin (H)  Continue current meds    Benign hypertension with CKD (chronic kidney disease) stage III (H)  Stable - continue      Assessment requiring an independent historian(s) - family -   Diagnosis or treatment significantly limited by social determinants of health - language  Ordering of each unique test  I spent a total of 35 minutes on the day of the visit.   Time spent doing chart review, history and exam, documentation and further activities per the note       See Patient Instructions    Return in about 2 months (around 4/12/2023) for Diabetes Check, Annual Wellness Visit.    Braxton Boone MD  [facility]   Gregoria Coombs is a 77 year old accompanied by her spouse, presenting for the following health issues:  No chief complaint on file.      HPI       COVID-19 Symptom Review  How many days ago did these symptoms start? 5 days    Are any of the following symptoms significant for you?    New or worsening difficulty breathing? No    Worsening cough? No    Fever or chills? Yes, I felt feverish or had chills.    Headache: No    Sore throat: YES    Chest pain: No    Diarrhea: No    Body aches? No    What treatments has patient tried? Guaifenesin (mucinex), Acetaminophen, Antihistamines  and Cough syrup   Does patient live in a nursing home, group home, or shelter? No  Does patient have a way to get food/medications during quarantined? Yes, I have a friend or family member who can help me.              Review of Systems         Objective    /61   Pulse 68   SpO2 94%   There is no height or weight on file to calculate BMI.  Physical Exam   GENERAL: alert and no distress  RESP: lungs clear to auscultation - no rales, rhonchi or wheezes  CV: regular  rate and rhythm, normal S1 S2, no S3 or S4, no murmur, click or rub, no peripheral edema and peripheral pulses strong

## 2023-01-31 NOTE — TELEPHONE ENCOUNTER
Upon chart review, patient had an appointment with Dr. Boone today (1/31/23).    Closing encounter.    Madison Pichardo RN

## 2023-01-31 NOTE — PATIENT INSTRUCTIONS
Coping with Life After COVID-19  Being in the hospital because of COVID-19 is scary. Going home can be scary, too. You may face changes to your life, the way you work or what you can eat. It s hard to adjust to change, and it s normal to feel afraid, frustrated or even angry. These feelings usually go away over time. If your feelings don t start to get better, it s called  adjustment disorder.      What signs should I look out for?  Adjustment disorder can happen to anyone in a time of stress. It makes it hard to cope with daily life. You may feel lonely or fight with loved ones, even if you re glad to be home. Watch for these signs:    Fear or worry    Hard time focusing    Sadness or anger    Trouble talking to family or friends    Feeling like you don t fit in or isolating yourself    Problems with sleep     Drinking alcohol or taking drugs to cope    What can I do?  You can help yourself get better. Feeling you have control helps you move forward. You may wonder if you ll be able to do things you did before. Be patient. Do your best to make the most of every day. Try to build relationships, be as active as you can, eat right and keep a sense of humor. Avoid smoking and drinking too much alcohol. Call your family doctor or clinic if you re not sure what to do. They can guide you to care or other services.    When should I get help?  Think about getting counseling if your sadness or frustration gets worse. Together with a trained counselor, you can talk about your problems adjusting to life after your hospital stay. You can come up with new ways to handle changes that give you more control. Your family doctor or care team can help you find a counselor.     Get help if you re thinking about hurting yourself. If you need help right away, call 911 or go to the nearest emergency room. You can also try the Crisis Text Line.    Crisis Text Line: 495-438 (http://www.crisistextline.org)  The Crisis Text Line serves  anyone, in any crisis. It gives free, 24/7 support. Here's how it works:  1. Text HOME to 628491 from anywhere in the [place], anytime, about any type of crisis.  2. A live, trained Crisis Counselor will text you back quickly.  3. The volunteer Crisis Counselor can help you move from a  hot moment  to a  cool moment.  They can also help you work out a safety plan.

## 2023-01-31 NOTE — TELEPHONE ENCOUNTER
Patient Contact    Attempt # 3    Was call answered?  No.  Left message on voicemail with information to call me back via .     Upon callback discuss covid symptoms and treatment with paxlovid please.

## 2023-02-03 DIAGNOSIS — Z79.4 TYPE 2 DIABETES MELLITUS WITH MICROALBUMINURIA, WITH LONG-TERM CURRENT USE OF INSULIN (H): ICD-10-CM

## 2023-02-03 DIAGNOSIS — R80.9 TYPE 2 DIABETES MELLITUS WITH MICROALBUMINURIA, WITH LONG-TERM CURRENT USE OF INSULIN (H): ICD-10-CM

## 2023-02-03 DIAGNOSIS — E11.29 TYPE 2 DIABETES MELLITUS WITH MICROALBUMINURIA, WITH LONG-TERM CURRENT USE OF INSULIN (H): ICD-10-CM

## 2023-02-03 RX ORDER — PEN NEEDLE, DIABETIC 29 G X1/2"
NEEDLE, DISPOSABLE MISCELLANEOUS
Qty: 100 EACH | Refills: 0 | Status: SHIPPED | OUTPATIENT
Start: 2023-02-03 | End: 2023-05-15

## 2023-02-24 DIAGNOSIS — E06.3 AUTOIMMUNE HYPOTHYROIDISM: ICD-10-CM

## 2023-02-24 RX ORDER — LEVOTHYROXINE SODIUM 75 UG/1
TABLET ORAL
Qty: 90 TABLET | Refills: 0 | Status: SHIPPED | OUTPATIENT
Start: 2023-02-24 | End: 2023-04-28

## 2023-03-20 DIAGNOSIS — I10 HTN (HYPERTENSION), BENIGN: ICD-10-CM

## 2023-03-20 RX ORDER — AMLODIPINE BESYLATE 10 MG/1
10 TABLET ORAL DAILY
Qty: 60 TABLET | Refills: 0 | Status: SHIPPED | OUTPATIENT
Start: 2023-03-20 | End: 2023-04-28

## 2023-04-14 DIAGNOSIS — R80.9 TYPE 2 DIABETES MELLITUS WITH MICROALBUMINURIA, WITHOUT LONG-TERM CURRENT USE OF INSULIN (H): ICD-10-CM

## 2023-04-14 DIAGNOSIS — E11.29 TYPE 2 DIABETES MELLITUS WITH MICROALBUMINURIA, WITHOUT LONG-TERM CURRENT USE OF INSULIN (H): ICD-10-CM

## 2023-04-14 RX ORDER — METFORMIN HCL 500 MG
TABLET, EXTENDED RELEASE 24 HR ORAL
Qty: 90 TABLET | Refills: 0 | Status: SHIPPED | OUTPATIENT
Start: 2023-04-14 | End: 2023-04-28

## 2023-04-19 DIAGNOSIS — E11.29 TYPE 2 DIABETES MELLITUS WITH MICROALBUMINURIA, WITH LONG-TERM CURRENT USE OF INSULIN (H): ICD-10-CM

## 2023-04-19 DIAGNOSIS — R80.9 TYPE 2 DIABETES MELLITUS WITH MICROALBUMINURIA, WITH LONG-TERM CURRENT USE OF INSULIN (H): ICD-10-CM

## 2023-04-19 DIAGNOSIS — Z79.4 TYPE 2 DIABETES MELLITUS WITH MICROALBUMINURIA, WITH LONG-TERM CURRENT USE OF INSULIN (H): ICD-10-CM

## 2023-04-20 RX ORDER — INSULIN GLARGINE 100 [IU]/ML
INJECTION, SOLUTION SUBCUTANEOUS
Qty: 30 ML | Refills: 0 | Status: SHIPPED | OUTPATIENT
Start: 2023-04-20 | End: 2023-04-28

## 2023-04-20 NOTE — TELEPHONE ENCOUNTER
MR, pt in clinic today, has appt 04/28/2023, states out of medication today, please review refill request    Marisela Phillips CMA

## 2023-04-21 ENCOUNTER — LAB (OUTPATIENT)
Dept: LAB | Facility: CLINIC | Age: 78
End: 2023-04-21
Payer: COMMERCIAL

## 2023-04-21 DIAGNOSIS — N18.30 BENIGN HYPERTENSION WITH CKD (CHRONIC KIDNEY DISEASE) STAGE III (H): ICD-10-CM

## 2023-04-21 DIAGNOSIS — E11.29 TYPE 2 DIABETES MELLITUS WITH MICROALBUMINURIA, WITHOUT LONG-TERM CURRENT USE OF INSULIN (H): ICD-10-CM

## 2023-04-21 DIAGNOSIS — I12.9 BENIGN HYPERTENSION WITH CKD (CHRONIC KIDNEY DISEASE) STAGE III (H): ICD-10-CM

## 2023-04-21 DIAGNOSIS — E06.3 AUTOIMMUNE HYPOTHYROIDISM: ICD-10-CM

## 2023-04-21 DIAGNOSIS — R80.9 TYPE 2 DIABETES MELLITUS WITH MICROALBUMINURIA, WITHOUT LONG-TERM CURRENT USE OF INSULIN (H): ICD-10-CM

## 2023-04-21 DIAGNOSIS — I10 HTN (HYPERTENSION), BENIGN: ICD-10-CM

## 2023-04-21 LAB
CHOLEST SERPL-MCNC: 130 MG/DL
CREAT UR-MCNC: 73.2 MG/DL
HBA1C MFR BLD: 7.5 % (ref 0–5.6)
HDLC SERPL-MCNC: 46 MG/DL
HGB BLD-MCNC: 14.5 G/DL (ref 11.7–15.7)
LDLC SERPL CALC-MCNC: 40 MG/DL
MICROALBUMIN UR-MCNC: 340 MG/L
MICROALBUMIN/CREAT UR: 464.48 MG/G CR (ref 0–25)
NONHDLC SERPL-MCNC: 84 MG/DL
TRIGL SERPL-MCNC: 218 MG/DL
TSH SERPL DL<=0.005 MIU/L-ACNC: 3.45 UIU/ML (ref 0.3–4.2)

## 2023-04-21 PROCEDURE — 83036 HEMOGLOBIN GLYCOSYLATED A1C: CPT

## 2023-04-21 PROCEDURE — 84443 ASSAY THYROID STIM HORMONE: CPT

## 2023-04-21 PROCEDURE — 80061 LIPID PANEL: CPT

## 2023-04-21 PROCEDURE — 85018 HEMOGLOBIN: CPT

## 2023-04-21 PROCEDURE — 36415 COLL VENOUS BLD VENIPUNCTURE: CPT

## 2023-04-21 PROCEDURE — 82570 ASSAY OF URINE CREATININE: CPT

## 2023-04-21 PROCEDURE — 82043 UR ALBUMIN QUANTITATIVE: CPT

## 2023-04-21 RX ORDER — LOSARTAN POTASSIUM 50 MG/1
50 TABLET ORAL DAILY
Qty: 30 TABLET | Refills: 0 | Status: SHIPPED | OUTPATIENT
Start: 2023-04-21 | End: 2023-04-28

## 2023-04-28 ENCOUNTER — OFFICE VISIT (OUTPATIENT)
Dept: INTERNAL MEDICINE | Facility: CLINIC | Age: 78
End: 2023-04-28
Payer: COMMERCIAL

## 2023-04-28 VITALS
BODY MASS INDEX: 20.82 KG/M2 | RESPIRATION RATE: 18 BRPM | HEIGHT: 61 IN | HEART RATE: 78 BPM | OXYGEN SATURATION: 94 % | TEMPERATURE: 98.5 F | DIASTOLIC BLOOD PRESSURE: 50 MMHG | WEIGHT: 110.3 LBS | SYSTOLIC BLOOD PRESSURE: 118 MMHG

## 2023-04-28 DIAGNOSIS — I10 HTN (HYPERTENSION), BENIGN: ICD-10-CM

## 2023-04-28 DIAGNOSIS — E06.3 AUTOIMMUNE HYPOTHYROIDISM: ICD-10-CM

## 2023-04-28 DIAGNOSIS — E11.29 TYPE 2 DIABETES MELLITUS WITH MICROALBUMINURIA, WITH LONG-TERM CURRENT USE OF INSULIN (H): ICD-10-CM

## 2023-04-28 DIAGNOSIS — R80.9 TYPE 2 DIABETES MELLITUS WITH MICROALBUMINURIA, WITHOUT LONG-TERM CURRENT USE OF INSULIN (H): ICD-10-CM

## 2023-04-28 DIAGNOSIS — R80.9 TYPE 2 DIABETES MELLITUS WITH MICROALBUMINURIA, WITH LONG-TERM CURRENT USE OF INSULIN (H): ICD-10-CM

## 2023-04-28 DIAGNOSIS — E11.29 TYPE 2 DIABETES MELLITUS WITH MICROALBUMINURIA, WITHOUT LONG-TERM CURRENT USE OF INSULIN (H): ICD-10-CM

## 2023-04-28 DIAGNOSIS — Z00.00 ENCOUNTER FOR MEDICARE ANNUAL WELLNESS EXAM: Primary | ICD-10-CM

## 2023-04-28 DIAGNOSIS — E78.5 HYPERLIPIDEMIA LDL GOAL <100: ICD-10-CM

## 2023-04-28 DIAGNOSIS — G30.9 MODERATE ALZHEIMER'S DEMENTIA WITHOUT BEHAVIORAL DISTURBANCE, PSYCHOTIC DISTURBANCE, MOOD DISTURBANCE, OR ANXIETY, UNSPECIFIED TIMING OF DEMENTIA ONSET (H): ICD-10-CM

## 2023-04-28 DIAGNOSIS — F02.B0 MODERATE ALZHEIMER'S DEMENTIA WITHOUT BEHAVIORAL DISTURBANCE, PSYCHOTIC DISTURBANCE, MOOD DISTURBANCE, OR ANXIETY, UNSPECIFIED TIMING OF DEMENTIA ONSET (H): ICD-10-CM

## 2023-04-28 DIAGNOSIS — Z79.4 TYPE 2 DIABETES MELLITUS WITH MICROALBUMINURIA, WITH LONG-TERM CURRENT USE OF INSULIN (H): ICD-10-CM

## 2023-04-28 DIAGNOSIS — E21.3 HYPERPARATHYROIDISM (H): ICD-10-CM

## 2023-04-28 PROCEDURE — 91312 COVID-19 VACCINE BIVALENT BOOSTER 12+ (PFIZER): CPT | Performed by: INTERNAL MEDICINE

## 2023-04-28 PROCEDURE — 99214 OFFICE O/P EST MOD 30 MIN: CPT | Mod: 25 | Performed by: INTERNAL MEDICINE

## 2023-04-28 PROCEDURE — G0439 PPPS, SUBSEQ VISIT: HCPCS | Performed by: INTERNAL MEDICINE

## 2023-04-28 PROCEDURE — 0124A COVID-19 VACCINE BIVALENT BOOSTER 12+ (PFIZER): CPT | Performed by: INTERNAL MEDICINE

## 2023-04-28 RX ORDER — DONEPEZIL HYDROCHLORIDE 5 MG/1
5 TABLET, FILM COATED ORAL AT BEDTIME
Qty: 30 TABLET | Refills: 0 | Status: SHIPPED | OUTPATIENT
Start: 2023-04-28 | End: 2023-05-28

## 2023-04-28 RX ORDER — LEVOTHYROXINE SODIUM 75 UG/1
75 TABLET ORAL DAILY
Qty: 90 TABLET | Refills: 3 | Status: SHIPPED | OUTPATIENT
Start: 2023-04-28 | End: 2024-04-25

## 2023-04-28 RX ORDER — DONEPEZIL HYDROCHLORIDE 10 MG/1
10 TABLET, FILM COATED ORAL AT BEDTIME
Qty: 90 TABLET | Refills: 3 | Status: SHIPPED | OUTPATIENT
Start: 2023-05-28 | End: 2023-06-27

## 2023-04-28 RX ORDER — LOSARTAN POTASSIUM 100 MG/1
100 TABLET ORAL DAILY
Qty: 90 TABLET | Refills: 3 | Status: SHIPPED | OUTPATIENT
Start: 2023-04-28 | End: 2024-04-22

## 2023-04-28 RX ORDER — ATORVASTATIN CALCIUM 20 MG/1
20 TABLET, FILM COATED ORAL DAILY
Qty: 90 TABLET | Refills: 3 | Status: SHIPPED | OUTPATIENT
Start: 2023-04-28 | End: 2024-04-25

## 2023-04-28 RX ORDER — METFORMIN HCL 500 MG
500 TABLET, EXTENDED RELEASE 24 HR ORAL
Qty: 90 TABLET | Refills: 3 | Status: SHIPPED | OUTPATIENT
Start: 2023-04-28 | End: 2024-04-25

## 2023-04-28 RX ORDER — INSULIN GLARGINE 100 [IU]/ML
22 INJECTION, SOLUTION SUBCUTANEOUS EVERY MORNING
Qty: 30 ML | Refills: 3 | Status: SHIPPED | OUTPATIENT
Start: 2023-04-28 | End: 2023-06-27

## 2023-04-28 RX ORDER — AMLODIPINE BESYLATE 5 MG/1
5 TABLET ORAL DAILY
Qty: 90 TABLET | Refills: 3 | Status: SHIPPED | OUTPATIENT
Start: 2023-04-28 | End: 2023-09-28

## 2023-04-28 ASSESSMENT — ACTIVITIES OF DAILY LIVING (ADL): CURRENT_FUNCTION: TRANSPORTATION REQUIRES ASSISTANCE

## 2023-04-28 NOTE — PATIENT INSTRUCTIONS
As long as morning glucose levels less than 175 no change in her insulin dose  If they are > 175 then contact us.    Patient Education   Personalized Prevention Plan  You are due for the preventive services outlined below.  Your care team is available to assist you in scheduling these services.  If you have already completed any of these items, please share that information with your care team to update in your medical record.  Health Maintenance Due   Topic Date Due    Diptheria Tetanus Pertussis (DTAP/TDAP/TD) Vaccine (1 - Tdap) 02/28/2012    Zoster (Shingles) Vaccine (1 of 2) 12/14/2017    COVID-19 Vaccine (5 - Booster for Pfizer series) 07/28/2022    Diabetic Foot Exam  03/16/2023       Exercise for a Healthier Heart  You may wonder how you can improve the health of your heart. If you re thinking about exercise, you re on the right track. You don t need to become an athlete. But you do need a certain amount of brisk exercise to help strengthen your heart. If you have been diagnosed with a heart condition, your healthcare provider may advise exercise to help your condition. To help make exercise a habit, choose safe, fun activities.      Exercise with a friend. When activity is fun, you're more likely to stick with it.     Before you start  Check with your healthcare provider before starting an exercise program. This is especially important if you haven't been active for a while. It's also important if you have a long-term (chronic) health problem such as heart disease, diabetes, or obesity. Also check with your provider if you're at high risk for having these problems.   Why exercise?  Exercising regularly offers many healthy rewards. It can help you do all of these:   Improve your blood cholesterol level to help prevent further heart trouble.  Lower your blood pressure to help prevent a stroke or heart attack.  Control diabetes or reduce your risk of getting this disease.  Improve your heart and lung  function.  Reach and stay at a healthy weight.  Make your muscles stronger so you can stay active.  Prevent falls and fractures by slowing the loss of bone mass (osteoporosis).  Manage stress better.  Improve your sense of self and your body image.  Exercise tips    Ease into your routine. Set small goals. Then build on them. Talk with your healthcare provider first before starting an exercise routine if you're not sure what your activity level should be.  Exercise on most days. Aim for a total of at least 150 minutes (2 hours and 30 minutes) or more of moderate-intensity aerobic activity each week. You could also do 75 minutes (1 hour and 15 minutes) or more of vigorous-intensity aerobic activity each week. Or try for a combination of both. Moderate activity means that you breathe heavier and your heart rate increases, but you can still talk. Think about doing at least 30 minutes of moderate exercise, 5 times a week. It's OK to work up to the 30-minute period over time. Examples of moderate-intensity activity are brisk walking, gardening, and water aerobics.  Step up your daily activity level.  Along with your exercise program, try being more active the whole day. Walk instead of drive. Or park further away so that you take more steps each day. Do more household tasks or yard work. You may not be able to meet the advised amount of physical activity. But doing some moderate- or vigorous-intensity aerobic activity can help reduce your risk for heart disease. Your healthcare provider can help you figure out what is best for you.  Choose 1 or more activities you enjoy.  Walking is one of the easiest things you can do. You can also try swimming, riding a bike, dancing, or taking an exercise class.    Call 911  Call 911 right away if any of these occur:   Chest pain that doesn't go away quickly with rest  New burning, tightness, pressure, or heaviness in your chest, neck, shoulders, back, or arms  Abnormal or severe  shortness of breath  A very fast or irregular heartbeat (palpitations)  Fainting  When to call your healthcare provider  Call your healthcare provider if you have any of these:   Dizziness or lightheadedness  Mild shortness of breath or chest pain  Increased or new joint or muscle pain    Mahi last reviewed this educational content on 7/1/2022 2000-2022 [employer]. All rights reserved. This information is not intended as a substitute for professional medical care. Always follow your healthcare professional's instructions.        Activities of Daily Living    Your Health Risk Assessment indicates you have difficulties with activities of daily living such as housework, bathing, preparing meals, taking medication, etc. Please make a follow up appointment for us to address this issue in more detail.

## 2023-04-28 NOTE — PROGRESS NOTES
She is at risk for lack of exercise and has been provided with information to increase physical activity for the benefit of her well-being.  The patient reports that she has difficulty with activities of daily living. I have asked that the patient make a follow up appointment in *** weeks where this issue will be further evaluated and addressed.

## 2023-04-28 NOTE — PROGRESS NOTES
"SUBJECTIVE:   Gregoria Coombs is a 78 year old who presents for Preventive Visit.  Patient has been advised of split billing requirements and indicates understanding: Yes  Are you in the first 12 months of your Medicare coverage?  No    Healthy Habits:    In general, how would you rate your overall health?  Good    Frequency of exercise:  None    Do you usually eat at least 4 servings of fruit and vegetables a day, include whole grains    & fiber and avoid regularly eating high fat or \"junk\" foods?  Yes    Taking medications regularly:  Yes    Barriers to taking medications:  None    Medication side effects:  None    Ability to successfully perform activities of daily living:  Transportation requires assistance    Home Safety:  No safety concerns identified    Hearing Impairment:  No hearing concerns    In the past 6 months, have you been bothered by leaking of urine?  No    In general, how would you rate your overall mental or emotional health?  Good      PHQ-2 Total Score:    Additional concerns today:  Yes (discuss blood sugar)      Have you ever done Advance Care Planning? (For example, a Health Directive, POLST, or a discussion with a medical provider or your loved ones about your wishes): No, advance care planning information given to patient to review.  Patient plans to discuss their wishes with loved ones or provider.         Fall risk  Fallen 2 or more times in the past year?: No  Any fall with injury in the past year?: No    Cognitive Screening Not appropriate due to known dementia    Do you have sleep apnea, excessive snoring or daytime drowsiness?: no    Reviewed and updated as needed this visit by clinical staff     Meds              Reviewed and updated as needed this visit by Provider                 Social History     Tobacco Use     Smoking status: Never     Smokeless tobacco: Never   Vaping Use     Vaping status: Not on file   Substance Use Topics     Alcohol use: No     Alcohol/week: 0.0 standard drinks " of alcohol             4/28/2023     9:48 AM   Alcohol Use   Prescreen: >3 drinks/day or >7 drinks/week? No     Do you have a current opioid prescription? No  Do you use any other controlled substances or medications that are not prescribed by a provider? None    Six Item Cognitive Impairment Test   (6CIT):      What year is it?                               Incorrect - 4 points    What month is it?                               Correct - 0 points      Give the patient an address to remember with five components:   [place] ( first and last name - 2 components)   [address]  (number and name of street - 2 components)   [place] ( city - 1 component)      About what time is it (within the hour)? Correct - 0 points    Count backwards from 20 to 1:   More than one error - 4 points    Say the months of the year in reverse: More than one error - 4 points    Repeat the address phrase:   All wrong - 10 points    Total 6CIT Score:      22/28    Interpretation: The 6CIT uses an inverse score and questions are weighted to produce a total out of 28. Scores of 0-7 are considered normal and 8 or more significant.    Advantages The test has high sensitivity without compromising specificity even in mild dementia. It is easy to translate linguistically and culturally.  Disadvantages The main disadvantage is in the scoring and weighting of the test, which is initially confusing, however computer models have simplified this greatly.    Probability Statistics: At the 7/8 cut off: Overall figures sensitivity 90% specificity 100%, in mild dementia sensitivity = 78% , specificity = 100%    Copyright 2000 The [facility], [facility]. Courtesy of Dr. Spencer Salcedo                Current providers sharing in care for this patient include:   Patient Care Team:  Braxton Boone MD as PCP - General  Braxton Boone MD as Assigned PCP    The following health maintenance items are reviewed in Epic and correct as  "of today:  Health Maintenance   Topic Date Due     DTAP/TDAP/TD IMMUNIZATION (1 - Tdap) 02/28/2012     ZOSTER IMMUNIZATION (1 of 2) 12/14/2017     DIABETIC FOOT EXAM  03/16/2023     EYE EXAM  06/01/2023     ANNUAL REVIEW OF HM ORDERS  06/02/2023     TSH W/FREE T4 REFLEX  07/21/2023     A1C  10/21/2023     BMP  10/28/2023     LIPID  04/21/2024     MICROALBUMIN  04/21/2024     HEMOGLOBIN  04/21/2024     MEDICARE ANNUAL WELLNESS VISIT  04/28/2024     FALL RISK ASSESSMENT  04/28/2024     DEXA  05/09/2025     ADVANCE CARE PLANNING  04/28/2028     HEPATITIS C SCREENING  Completed     PHQ-2 (once per calendar year)  Completed     INFLUENZA VACCINE  Completed     Pneumococcal Vaccine: 65+ Years  Completed     URINALYSIS  Completed     COVID-19 Vaccine  Completed     IPV IMMUNIZATION  Aged Out     MENINGITIS IMMUNIZATION  Aged Out     MAMMO SCREENING  Discontinued     COLORECTAL CANCER SCREENING  Discontinued             Pertinent mammograms are reviewed under the imaging tab.    Review of Systems      OBJECTIVE:   /50   Pulse 78   Temp 98.5  F (36.9  C) (Temporal)   Resp 18   Ht 1.549 m (5' 1\")   Wt 50 kg (110 lb 4.8 oz)   SpO2 94%   BMI 20.84 kg/m   Estimated body mass index is 20.84 kg/m  as calculated from the following:    Height as of this encounter: 1.549 m (5' 1\").    Weight as of this encounter: 50 kg (110 lb 4.8 oz).  Physical Exam  GENERAL APPEARANCE: alert, no distress, elderly and frail  EYES: Eyes grossly normal to inspection, PERRL and conjunctivae and sclerae normal  NECK: no adenopathy, no asymmetry, masses, or scars and thyroid normal to palpation  RESP: lungs clear to auscultation - no rales, rhonchi or wheezes  CV: regular rate and rhythm, normal S1 S2, no S3 or S4, no murmur, click or rub, no peripheral edema and peripheral pulses strong  ABDOMEN: soft, nontender, no hepatosplenomegaly, no masses and bowel sounds normal  MS: no musculoskeletal defects are noted and gait is age appropriate " without ataxia  SKIN: no suspicious lesions or rashes  NEURO: Normal strength and tone, sensory exam grossly normal, mentation intact and speech normal  DIABETIC FOOT EXAM: normal DP and PT pulses, no trophic changes or ulcerative lesions and normal sensory exam    Labs reviewed in Epic    ASSESSMENT / PLAN:       ICD-10-CM    1. Encounter for Medicare annual wellness exam  Z00.00       2. 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 Type 2 diabetes mellitus with microalbuminuria, with long-term current use of insulin (H)  E11.29 insulin glargine (LANTUS SOLOSTAR) 100 UNIT/ML pen    R80.9 **Basic metabolic panel FUTURE 14d    Z79.4       4. HTN (hypertension), benign  I10 **Basic metabolic panel FUTURE 14d     amLODIPine (NORVASC) 5 MG tablet     losartan (COZAAR) 100 MG tablet      5. Hyperparathyroidism (H)  E21.3 **Basic metabolic panel FUTURE 14d        -Updated screening, immunizations, prevention.  Please see health maintenance list, care gaps  -With significant microalbuminuria will increase the losartan to 100 mg but reduce amlodipine to 5 so she does not become hypotensive  -Dementia is worsening.  We first noticed this several years ago refer to memory clinic but family never followed up.  I will start Aricept today recommended follow-up 1 to 2 months and we can titrate this up further.  Again recommended memory clinic evaluation  -Recheck BMP prior to follow-up.  I have recommended bisphosphonate for her hyperparathyroidism and may be useful to do this with zoledronic acid rather than alendronate. Await Ca+ on recheck    Patient has been advised of split billing requirements and indicates understanding: Yes      COUNSELING:  Reviewed preventive health counseling, as reflected in  patient instructions        She reports that she has never smoked. She has never used smokeless tobacco.      Appropriate preventive services were discussed with this patient, including applicable screening as appropriate for cardiovascular disease, diabetes, osteopenia/osteoporosis, and glaucoma.  As appropriate for age/gender, discussed screening for colorectal cancer, prostate cancer, breast cancer, and cervical cancer. Checklist reviewing preventive services available has been given to the patient.    Reviewed patients plan of care and provided an AVS. The Basic Care Plan (routine screening as documented in Health Maintenance) for Gregoria Coombs meets the Care Plan requirement. This Care Plan has been established and reviewed with the Patient and spouse.          Braxton Boone MD  [facility]    Identified Health Risks:    I have reviewed Opioid Use Disorder and Substance Use Disorder risk factors and made any needed referrals.

## 2023-05-02 DIAGNOSIS — G47.00 INSOMNIA, UNSPECIFIED TYPE: ICD-10-CM

## 2023-05-02 RX ORDER — TRAZODONE HYDROCHLORIDE 100 MG/1
TABLET ORAL
Qty: 90 TABLET | Refills: 0 | Status: SHIPPED | OUTPATIENT
Start: 2023-05-02 | End: 2023-06-27

## 2023-05-14 DIAGNOSIS — R80.9 TYPE 2 DIABETES MELLITUS WITH MICROALBUMINURIA, WITH LONG-TERM CURRENT USE OF INSULIN (H): ICD-10-CM

## 2023-05-14 DIAGNOSIS — Z79.4 TYPE 2 DIABETES MELLITUS WITH MICROALBUMINURIA, WITH LONG-TERM CURRENT USE OF INSULIN (H): ICD-10-CM

## 2023-05-14 DIAGNOSIS — E11.29 TYPE 2 DIABETES MELLITUS WITH MICROALBUMINURIA, WITH LONG-TERM CURRENT USE OF INSULIN (H): ICD-10-CM

## 2023-05-15 RX ORDER — PEN NEEDLE, DIABETIC 29 G X1/2"
NEEDLE, DISPOSABLE MISCELLANEOUS
Qty: 100 EACH | Refills: 1 | Status: SHIPPED | OUTPATIENT
Start: 2023-05-15 | End: 2023-12-01

## 2023-05-28 DIAGNOSIS — R05.3 CHRONIC COUGH: ICD-10-CM

## 2023-05-30 RX ORDER — FLUTICASONE PROPIONATE 50 MCG
SPRAY, SUSPENSION (ML) NASAL
Qty: 16 G | Refills: 9 | Status: SHIPPED | OUTPATIENT
Start: 2023-05-30

## 2023-06-12 ENCOUNTER — TELEPHONE (OUTPATIENT)
Dept: INTERNAL MEDICINE | Facility: CLINIC | Age: 78
End: 2023-06-12
Payer: COMMERCIAL

## 2023-06-12 ENCOUNTER — NURSE TRIAGE (OUTPATIENT)
Dept: NURSING | Facility: CLINIC | Age: 78
End: 2023-06-12
Payer: COMMERCIAL

## 2023-06-12 NOTE — TELEPHONE ENCOUNTER
Patient Contact    Attempt # 1    Was call answered?  No.  Left message on voicemail with information to call me back.    On call back, please Triage patient reported symptoms.

## 2023-06-12 NOTE — TELEPHONE ENCOUNTER
Reason for Call:  Other prescription    Detailed comments: pt is having an reaction to Donepezil medication that was prescribed to her recently. She is having a number of abnormal side affects.    Phone Number Patient can be reached at: [phone]    Best Time: anytime    Can we leave a detailed message on this number? yes  Call taken on 6/12/2023 at 2:06 PM by Kenna Bell MA

## 2023-06-13 NOTE — TELEPHONE ENCOUNTER
Should have been on 5 mg prior to this - if so and tolerated (assume so given no calls on that dose) cut back down to 1/2 dose ( 5 mg) , after a week or so try increasing back to 10 mg but do so by taking 5 mg BID for 1-2 weeks to see if tolerated. Then try the 10 mg dose once daily.

## 2023-06-13 NOTE — TELEPHONE ENCOUNTER
Patient Contact    Attempt # 1    Was call answered?  Yes.     Relayed provider message via Czech  to pt's . He verbalizes understanding and agrees to plan of care.   Discussed cutting 10mg tablet in half - pt/pt's agrees to do this for now. Will contact clinic if symptoms do not improve.

## 2023-06-13 NOTE — TELEPHONE ENCOUNTER
reports patient  recently started on Aricept for 10 days and she is having side effects as listed on product insert   Is dizzier  and needs to steady self before she walks and needs  help supporting  herself to walk; poor sleep, does not seem to sleep deeply and wakes frequently , poor appetite, eating only one meal a day, diarrhea  for 2 days with 2 x today  and  gave  antidiarrhea medicine  (  Zoila co) ;  worsening muscle cramps in legs not releived with massage.      Advised to stop medicine and  Contact PCP in morning    Will message provider with [facility] to respond to patient  Tomorrow  Advised to be sure patient is well hydrated , has food intake and protect from falls   Advised  to call back if symptoms worsen     Leann Hopson RN  [facility]     Reason for Disposition    [1] Caller has NON-URGENT medicine question about med that PCP prescribed AND [2] triager unable to answer question    Protocols used: MEDICATION QUESTION CALL-A-

## 2023-06-23 ENCOUNTER — LAB (OUTPATIENT)
Dept: LAB | Facility: CLINIC | Age: 78
End: 2023-06-23
Attending: INTERNAL MEDICINE
Payer: COMMERCIAL

## 2023-06-23 DIAGNOSIS — I10 HTN (HYPERTENSION), BENIGN: ICD-10-CM

## 2023-06-23 DIAGNOSIS — E11.29 TYPE 2 DIABETES MELLITUS WITH MICROALBUMINURIA, WITH LONG-TERM CURRENT USE OF INSULIN (H): ICD-10-CM

## 2023-06-23 DIAGNOSIS — Z79.4 TYPE 2 DIABETES MELLITUS WITH MICROALBUMINURIA, WITH LONG-TERM CURRENT USE OF INSULIN (H): ICD-10-CM

## 2023-06-23 DIAGNOSIS — R80.9 TYPE 2 DIABETES MELLITUS WITH MICROALBUMINURIA, WITH LONG-TERM CURRENT USE OF INSULIN (H): ICD-10-CM

## 2023-06-23 DIAGNOSIS — E21.3 HYPERPARATHYROIDISM (H): ICD-10-CM

## 2023-06-23 LAB
ANION GAP SERPL CALCULATED.3IONS-SCNC: 12 MMOL/L (ref 7–15)
BUN SERPL-MCNC: 15.9 MG/DL (ref 8–23)
CALCIUM SERPL-MCNC: 11.9 MG/DL (ref 8.8–10.2)
CHLORIDE SERPL-SCNC: 105 MMOL/L (ref 98–107)
CREAT SERPL-MCNC: 1.24 MG/DL (ref 0.51–0.95)
DEPRECATED HCO3 PLAS-SCNC: 24 MMOL/L (ref 22–29)
GFR SERPL CREATININE-BSD FRML MDRD: 44 ML/MIN/1.73M2
GLUCOSE SERPL-MCNC: 80 MG/DL (ref 70–99)
POTASSIUM SERPL-SCNC: 4.2 MMOL/L (ref 3.4–5.3)
SODIUM SERPL-SCNC: 141 MMOL/L (ref 136–145)

## 2023-06-23 PROCEDURE — 36415 COLL VENOUS BLD VENIPUNCTURE: CPT

## 2023-06-23 PROCEDURE — 80048 BASIC METABOLIC PNL TOTAL CA: CPT

## 2023-06-27 ENCOUNTER — OFFICE VISIT (OUTPATIENT)
Dept: INTERNAL MEDICINE | Facility: CLINIC | Age: 78
End: 2023-06-27
Attending: INTERNAL MEDICINE
Payer: COMMERCIAL

## 2023-06-27 VITALS
SYSTOLIC BLOOD PRESSURE: 130 MMHG | HEART RATE: 62 BPM | TEMPERATURE: 98.3 F | OXYGEN SATURATION: 97 % | WEIGHT: 106.5 LBS | HEIGHT: 61 IN | DIASTOLIC BLOOD PRESSURE: 56 MMHG | BODY MASS INDEX: 20.11 KG/M2

## 2023-06-27 DIAGNOSIS — E21.3 HYPERPARATHYROIDISM (H): ICD-10-CM

## 2023-06-27 DIAGNOSIS — E11.29 TYPE 2 DIABETES MELLITUS WITH MICROALBUMINURIA, WITH LONG-TERM CURRENT USE OF INSULIN (H): ICD-10-CM

## 2023-06-27 DIAGNOSIS — R80.9 TYPE 2 DIABETES MELLITUS WITH MICROALBUMINURIA, WITH LONG-TERM CURRENT USE OF INSULIN (H): ICD-10-CM

## 2023-06-27 DIAGNOSIS — E83.52 HYPERCALCEMIA: ICD-10-CM

## 2023-06-27 DIAGNOSIS — Z79.4 TYPE 2 DIABETES MELLITUS WITH MICROALBUMINURIA, WITH LONG-TERM CURRENT USE OF INSULIN (H): ICD-10-CM

## 2023-06-27 DIAGNOSIS — G47.00 INSOMNIA, UNSPECIFIED TYPE: ICD-10-CM

## 2023-06-27 DIAGNOSIS — F03.B0 MODERATE DEMENTIA WITHOUT BEHAVIORAL DISTURBANCE, PSYCHOTIC DISTURBANCE, MOOD DISTURBANCE, OR ANXIETY, UNSPECIFIED DEMENTIA TYPE (H): Primary | ICD-10-CM

## 2023-06-27 PROCEDURE — 99214 OFFICE O/P EST MOD 30 MIN: CPT | Performed by: INTERNAL MEDICINE

## 2023-06-27 RX ORDER — TRAZODONE HYDROCHLORIDE 100 MG/1
TABLET ORAL
Start: 2023-06-27 | End: 2023-07-31

## 2023-06-27 RX ORDER — MEMANTINE HYDROCHLORIDE 5 MG/1
5 TABLET ORAL DAILY
Qty: 30 TABLET | Refills: 11 | Status: SHIPPED | OUTPATIENT
Start: 2023-06-27 | End: 2023-09-28

## 2023-06-27 RX ORDER — INSULIN GLARGINE 100 [IU]/ML
20 INJECTION, SOLUTION SUBCUTANEOUS EVERY MORNING
Start: 2023-06-27 | End: 2024-04-25

## 2023-06-27 ASSESSMENT — PAIN SCALES - GENERAL: PAINLEVEL: NO PAIN (0)

## 2023-06-27 NOTE — PROGRESS NOTES
"  Assessment & Plan     Moderate dementia without behavioral disturbance, psychotic disturbance, mood disturbance, or anxiety, unspecified dementia type (H)  Worsening dizziness with Aricept.  This seemed even be present on the lower dose.  The other anticholinesterase meds are too expensive.  We will go ahead and try Namenda  - memantine (NAMENDA) 5 MG tablet; Take 1 tablet (5 mg) by mouth daily    Type 2 diabetes mellitus with microalbuminuria, with long-term current use of insulin (H)  Numbers look quite good.  Back off a bit on morning insulin dosing to 20 units  - insulin glargine (LANTUS SOLOSTAR) 100 UNIT/ML pen; Inject 20 Units Subcutaneous every morning    Hyperparathyroidism (H)  Hypercalcemia  -She is not an appropriate surgical candidate.  Patient and her  have refused this as well.  I do not believe she is having any symptoms-given this unless her calcium is greater than 14 general recommendation is monitoring.  I did give him the option of using a bisphosphonate and they refused  -Decrease calcium intake including stopping calcium supplements if using any  Discontinue vitamin D supplements  -Discontinue multivitamins containing calcium      Review of the result(s) of each unique test - labs  Ordering of each unique test  Prescription drug management             Braxton Boone MD  [facility]   Gregoria Coombs is a 78 year old, presenting for the following health issues:  Recheck Medication (Follow up Aricept)        6/27/2023     9:53 AM   Additional Questions   Roomed by Shala ANDRES     Follow up Aricept  Stopped taking due to side effects:  Tired, weak, dizzy  Diarrhea, upset stomach, vomiting  Sleep issue, muscle cramps, decreased appetite          Review of Systems         Objective    Ht 1.549 m (5' 1\")   LMP  (LMP Unknown)   Breastfeeding No   BMI 20.84 kg/m    Body mass index is 20.84 kg/m .  Physical Exam   GENERAL: alert and no " distress  NECK: no adenopathy, no asymmetry, masses, or scars and thyroid normal to palpation  RESP: lungs clear to auscultation - no rales, rhonchi or wheezes  CV: regular rate and rhythm, normal S1 S2, no S3 or S4, no murmur, click or rub, no peripheral edema and peripheral pulses strong  NEURO: Normal strength and tone and gait stable and normal

## 2023-07-30 DIAGNOSIS — G47.00 INSOMNIA, UNSPECIFIED TYPE: ICD-10-CM

## 2023-07-31 RX ORDER — TRAZODONE HYDROCHLORIDE 100 MG/1
TABLET ORAL
Qty: 90 TABLET | Refills: 0 | OUTPATIENT
Start: 2023-07-31

## 2023-07-31 RX ORDER — TRAZODONE HYDROCHLORIDE 100 MG/1
TABLET ORAL
Qty: 90 TABLET | Refills: 3 | Status: SHIPPED | OUTPATIENT
Start: 2023-07-31 | End: 2023-09-28 | Stop reason: ALTCHOICE

## 2023-08-02 ENCOUNTER — ANCILLARY PROCEDURE (OUTPATIENT)
Dept: GENERAL RADIOLOGY | Facility: CLINIC | Age: 78
End: 2023-08-02
Attending: PHYSICIAN ASSISTANT
Payer: COMMERCIAL

## 2023-08-02 ENCOUNTER — OFFICE VISIT (OUTPATIENT)
Dept: URGENT CARE | Facility: URGENT CARE | Age: 78
End: 2023-08-02
Payer: COMMERCIAL

## 2023-08-02 VITALS
WEIGHT: 106.6 LBS | SYSTOLIC BLOOD PRESSURE: 175 MMHG | TEMPERATURE: 99.5 F | DIASTOLIC BLOOD PRESSURE: 76 MMHG | BODY MASS INDEX: 20.14 KG/M2 | HEART RATE: 96 BPM | OXYGEN SATURATION: 96 %

## 2023-08-02 DIAGNOSIS — R05.1 ACUTE COUGH: Primary | ICD-10-CM

## 2023-08-02 DIAGNOSIS — R07.0 THROAT PAIN: ICD-10-CM

## 2023-08-02 DIAGNOSIS — R05.1 ACUTE COUGH: ICD-10-CM

## 2023-08-02 DIAGNOSIS — Z20.822 SUSPECTED 2019 NOVEL CORONAVIRUS INFECTION: ICD-10-CM

## 2023-08-02 LAB
DEPRECATED S PYO AG THROAT QL EIA: NEGATIVE
GROUP A STREP BY PCR: NOT DETECTED

## 2023-08-02 PROCEDURE — 71046 X-RAY EXAM CHEST 2 VIEWS: CPT | Mod: TC | Performed by: RADIOLOGY

## 2023-08-02 PROCEDURE — 99214 OFFICE O/P EST MOD 30 MIN: CPT | Performed by: PHYSICIAN ASSISTANT

## 2023-08-02 PROCEDURE — 87635 SARS-COV-2 COVID-19 AMP PRB: CPT | Performed by: PHYSICIAN ASSISTANT

## 2023-08-02 PROCEDURE — 87651 STREP A DNA AMP PROBE: CPT | Performed by: PHYSICIAN ASSISTANT

## 2023-08-02 NOTE — PROGRESS NOTES
SUBJECTIVE:  Gregoria Gan is a 78 year old female who presents to the clinic today with a chief complaint of shortness of breath. for 1 week(s).  Her cough is described as nonproductive.    The patient's symptoms are mild and worsening.  Associated symptoms include rhinorrhea. The patient's symptoms are exacerbated by lying down  Patient has been using fluticasone  to improve symptoms.    Past Medical History:   Diagnosis Date    CKD (chronic kidney disease) stage 3, GFR 30-59 ml/min (H)     HTN (hypertension), benign     Other and unspecified hyperlipidemia     Type 2 diabetes, HbA1c goal < 7% (H)     Unspecified hypothyroidism        Current Outpatient Medications   Medication Sig Dispense Refill    amLODIPine (NORVASC) 5 MG tablet Take 1 tablet (5 mg) by mouth daily 90 tablet 3    atorvastatin (LIPITOR) 20 MG tablet Take 1 tablet (20 mg) by mouth daily 90 tablet 3    fluticasone (FLONASE) 50 MCG/ACT nasal spray INSTILL 2 SPRAYS INTO BOTH NOSTRILS DAILY. 16 g 9    insulin glargine (LANTUS SOLOSTAR) 100 UNIT/ML pen Inject 20 Units Subcutaneous every morning      levothyroxine (SYNTHROID/LEVOTHROID) 75 MCG tablet Take 1 tablet (75 mcg) by mouth daily 90 tablet 3    losartan (COZAAR) 100 MG tablet Take 1 tablet (100 mg) by mouth daily 90 tablet 3    memantine (NAMENDA) 5 MG tablet Take 1 tablet (5 mg) by mouth daily 30 tablet 11    metFORMIN (GLUCOPHAGE XR) 500 MG 24 hr tablet Take 1 tablet (500 mg) by mouth daily (with dinner) PROFILE FOR FUTURE REFILLS 90 tablet 3    MULTIVITAMIN TABS   OR 1 TABLET DAILY      traZODone (DESYREL) 100 MG tablet TAKE ONE TABLET BY MOUTH IN THE EVENING AS NEEDED FOR SLEEP 90 tablet 3    [employer]IC[employer] PEN NEEDLES 31G X 5 MM miscellaneous Use 1 pen needles daily or as directed. 100 each 1    albuterol (PROAIR HFA) 108 (90 Base) MCG/ACT inhaler Inhale 2 puffs into the lungs every 6 hours 18 g 0       Social History     Tobacco Use    Smoking status: Never    Smokeless tobacco: Never    Substance Use Topics    Alcohol use: No     Alcohol/week: 0.0 standard drinks of alcohol       ROS  Review of systems negative except as stated above.    OBJECTIVE:  BP (!) 175/76 (BP Location: Left arm, Patient Position: Sitting, Cuff Size: Adult Regular)   Pulse 96   Temp 99.5  F (37.5  C) (Tympanic)   Wt 48.4 kg (106 lb 9.6 oz)   LMP  (LMP Unknown)   SpO2 96%   BMI 20.14 kg/m    GENERAL APPEARANCE: healthy, alert and no distress  EYES: conjunctiva clear  HENT: ear canals and TM's normal.  Nose and mouth without ulcers, erythema or lesions  NECK: supple, nontender, no lymphadenopathy  RESP: lungs clear to auscultation - no rales, rhonchi or wheezes  CV: regular rates and rhythm, normal S1 S2, no murmur noted  NEURO: Normal strength and tone, sensory exam grossly normal,  normal speech and mentation  SKIN: no suspicious lesions or rashes      Results for orders placed or performed in visit on 08/02/23   XR Chest 2 Views     Status: None    Narrative    XR CHEST 2 VIEWS  8/2/2023 4:03 PM       INDICATION: Acute cough  COMPARISON: 5/26/2022       Impression    IMPRESSION: Negative chest.    CHRISTOPHER LEARY MD         SYSTEM ID:  [id number]   Results for orders placed or performed in visit on 08/02/23   Streptococcus A Rapid Screen w/Reflex to PCR - Clinic Collect     Status: Normal    Specimen: Throat; Swab   Result Value Ref Range    Group A Strep antigen Negative Negative   Group A Streptococcus PCR Throat Swab     Status: Normal    Specimen: Throat; Swab   Result Value Ref Range    Group A strep by PCR Not Detected Not Detected    Narrative    The Xpert Xpress Strep A test, performed on the [employer], is a rapid, qualitative in vitro diagnostic test for the detection of Streptococcus pyogenes (Group A ß-hemolytic Streptococcus, Strep A) in throat swab specimens from patients with signs and symptoms of pharyngitis. The Xpert Xpress Strep A test can be used as an aid in the diagnosis of  Group A Streptococcal pharyngitis. The assay is not intended to monitor treatment for Group A Streptococcus infections. The Xpert Xpress Strep A test utilizes an automated real-time polymerase chain reaction (PCR) to detect Streptococcus pyogenes DNA.       ASSESSMENT:   (R05.1) Acute cough  (primary encounter diagnosis)  (R07.0) Throat pain  Plan: XR Chest 2 Views      (Z20.822) Suspected 2019 novel coronavirus infection  Plan: Symptomatic COVID-19 Virus (Coronavirus) by PCR        Nose, Streptococcus A Rapid Screen w/Reflex to         PCR - Clinic Collect, Group A Streptococcus PCR        Throat Swab    Follow up with PCP if symptoms worsen or fail to improve

## 2023-08-03 LAB — SARS-COV-2 RNA RESP QL NAA+PROBE: NEGATIVE

## 2023-08-20 NOTE — PROGRESS NOTES
Pt did not answer phone call     Gregoria Coombs is a 76 year old who is being evaluated via a billable telephone visit.      Subjective   Gregoria Coombs is a 76 year old who presents for the following health issues     HPI     Acute Illness  Acute illness concerns: cough (dry  Onset/Duration: 2 months  Symptoms:  Fever: no  Chills/Sweats: no  Headache (location?): no  Sinus Pressure: no  Conjunctivitis:  no  Ear Pain: no  Rhinorrhea: no  Congestion: no  Sore Throat: no  Cough: YES dry cough with no production of phlegm  Wheeze: no  Decreased Appetite: no  Nausea: no  Vomiting: no  Diarrhea: no  Dysuria/Freq.: no  Dysuria or Hematuria: no  Fatigue/Achiness: no  Sick/Strep Exposure: no  Therapies tried and outcome: Robitussin DM    Pt is tired and when she does sleep it is not a deep sleep.         
No

## 2023-08-28 ENCOUNTER — TRANSFERRED RECORDS (OUTPATIENT)
Dept: HEALTH INFORMATION MANAGEMENT | Facility: CLINIC | Age: 78
End: 2023-08-28
Payer: COMMERCIAL

## 2023-08-28 LAB — RETINOPATHY: NEGATIVE

## 2023-09-28 ENCOUNTER — OFFICE VISIT (OUTPATIENT)
Dept: INTERNAL MEDICINE | Facility: CLINIC | Age: 78
End: 2023-09-28
Payer: COMMERCIAL

## 2023-09-28 VITALS
SYSTOLIC BLOOD PRESSURE: 150 MMHG | DIASTOLIC BLOOD PRESSURE: 56 MMHG | RESPIRATION RATE: 16 BRPM | HEIGHT: 61 IN | OXYGEN SATURATION: 95 % | WEIGHT: 107.6 LBS | BODY MASS INDEX: 20.32 KG/M2 | HEART RATE: 94 BPM | TEMPERATURE: 97 F

## 2023-09-28 DIAGNOSIS — I10 HTN (HYPERTENSION), BENIGN: ICD-10-CM

## 2023-09-28 DIAGNOSIS — R80.9 TYPE 2 DIABETES MELLITUS WITH MICROALBUMINURIA, WITHOUT LONG-TERM CURRENT USE OF INSULIN (H): ICD-10-CM

## 2023-09-28 DIAGNOSIS — G47.00 INSOMNIA, UNSPECIFIED TYPE: ICD-10-CM

## 2023-09-28 DIAGNOSIS — E11.29 TYPE 2 DIABETES MELLITUS WITH MICROALBUMINURIA, WITHOUT LONG-TERM CURRENT USE OF INSULIN (H): ICD-10-CM

## 2023-09-28 DIAGNOSIS — F03.B0 MODERATE DEMENTIA WITHOUT BEHAVIORAL DISTURBANCE, PSYCHOTIC DISTURBANCE, MOOD DISTURBANCE, OR ANXIETY, UNSPECIFIED DEMENTIA TYPE (H): Primary | ICD-10-CM

## 2023-09-28 DIAGNOSIS — E83.52 HYPERCALCEMIA: ICD-10-CM

## 2023-09-28 LAB
ANION GAP SERPL CALCULATED.3IONS-SCNC: 13 MMOL/L (ref 7–15)
BUN SERPL-MCNC: 17.7 MG/DL (ref 8–23)
CALCIUM SERPL-MCNC: 11 MG/DL (ref 8.8–10.2)
CHLORIDE SERPL-SCNC: 107 MMOL/L (ref 98–107)
CREAT SERPL-MCNC: 1.12 MG/DL (ref 0.51–0.95)
EGFRCR SERPLBLD CKD-EPI 2021: 50 ML/MIN/1.73M2
GLUCOSE SERPL-MCNC: 287 MG/DL (ref 70–99)
HBA1C MFR BLD: 8.3 % (ref 0–5.6)
HCO3 SERPL-SCNC: 23 MMOL/L (ref 22–29)
POTASSIUM SERPL-SCNC: 4 MMOL/L (ref 3.4–5.3)
SODIUM SERPL-SCNC: 143 MMOL/L (ref 135–145)

## 2023-09-28 PROCEDURE — 80048 BASIC METABOLIC PNL TOTAL CA: CPT | Performed by: INTERNAL MEDICINE

## 2023-09-28 PROCEDURE — 90662 IIV NO PRSV INCREASED AG IM: CPT | Performed by: INTERNAL MEDICINE

## 2023-09-28 PROCEDURE — 99214 OFFICE O/P EST MOD 30 MIN: CPT | Mod: 25 | Performed by: INTERNAL MEDICINE

## 2023-09-28 PROCEDURE — 83036 HEMOGLOBIN GLYCOSYLATED A1C: CPT | Performed by: INTERNAL MEDICINE

## 2023-09-28 PROCEDURE — 36415 COLL VENOUS BLD VENIPUNCTURE: CPT | Performed by: INTERNAL MEDICINE

## 2023-09-28 PROCEDURE — G0008 ADMIN INFLUENZA VIRUS VAC: HCPCS | Performed by: INTERNAL MEDICINE

## 2023-09-28 RX ORDER — MEMANTINE HYDROCHLORIDE 5 MG/1
10 TABLET ORAL DAILY
Qty: 60 TABLET | Refills: 11 | Status: SHIPPED | OUTPATIENT
Start: 2023-09-28 | End: 2024-10-02

## 2023-09-28 RX ORDER — QUETIAPINE FUMARATE 50 MG/1
50 TABLET, FILM COATED ORAL
Qty: 30 TABLET | Refills: 0 | Status: SHIPPED | OUTPATIENT
Start: 2023-09-28 | End: 2024-10-03

## 2023-09-28 RX ORDER — AMLODIPINE BESYLATE 10 MG/1
10 TABLET ORAL DAILY
Qty: 90 TABLET | Refills: 3 | Status: SHIPPED | OUTPATIENT
Start: 2023-09-28 | End: 2024-09-09

## 2023-09-28 NOTE — LETTER
October 12, 2023      Gregoria Gan  [address] [address]        Dear ,    We are writing to inform you of your test results.    Gregoria's glucose # were a bit elevated.  Increase her insulin as follows  Every week look at her fasting AM reading. If it is > 120 add 2 units to the following weeks dose and adjust.  Repeat this each week and over time you should see her AM # decline while you increase the dose slowly.     Resulted Orders   Basic metabolic panel  (Ca, Cl, CO2, Creat, Gluc, K, Na, BUN)   Result Value Ref Range    Sodium 143 135 - 145 mmol/L      Comment:      Reference intervals for this test were updated on 09/26/2023 to more accurately reflect our healthy population. There may be differences in the flagging of prior results with similar values performed with this method. Interpretation of those prior results can be made in the context of the updated reference intervals.     Potassium 4.0 3.4 - 5.3 mmol/L    Chloride 107 98 - 107 mmol/L    Carbon Dioxide (CO2) 23 22 - 29 mmol/L    Anion Gap 13 7 - 15 mmol/L    Urea Nitrogen 17.7 8.0 - 23.0 mg/dL    Creatinine 1.12 (H) 0.51 - 0.95 mg/dL    GFR Estimate 50 (L) >60 mL/min/1.73m2    Calcium 11.0 (H) 8.8 - 10.2 mg/dL    Glucose 287 (H) 70 - 99 mg/dL   Hemoglobin A1c   Result Value Ref Range    Hemoglobin A1C 8.3 (H) 0.0 - 5.6 %      Comment:      Normal <5.7%   Prediabetes 5.7-6.4%    Diabetes 6.5% or higher     Note: Adopted from ADA consensus guidelines.    Narrative   If you have any questions or concerns, please call the clinic at the number listed above.       Sincerely,    Braxtno Boone M.D.  Dept of Internal Medicine- [facility]

## 2023-09-28 NOTE — PROGRESS NOTES
Assessment & Plan     Moderate dementia without behavioral disturbance, psychotic disturbance, mood disturbance, or anxiety, unspecified dementia type (H)  Increase to 10 mg   - memantine (NAMENDA) 5 MG tablet; Take 2 tablets (10 mg) by mouth daily    Insomnia, unspecified type  Try this vs trazodone   - QUEtiapine (SEROQUEL) 50 MG tablet; Take 1 tablet (50 mg) by mouth nightly as needed (insomnia)    Type 2 diabetes mellitus with microalbuminuria, without long-term current use of insulin (H)  Cont meds  - Basic metabolic panel  (Ca, Cl, CO2, Creat, Gluc, K, Na, BUN); Future  - Hemoglobin A1c; Future  - Basic metabolic panel  (Ca, Cl, CO2, Creat, Gluc, K, Na, BUN)  - Hemoglobin A1c    Hypercalcemia  As per prior discussion - unless Ca+13-14 pt/family prefers no rx.   - Basic metabolic panel  (Ca, Cl, CO2, Creat, Gluc, K, Na, BUN); Future  - Basic metabolic panel  (Ca, Cl, CO2, Creat, Gluc, K, Na, BUN)    HTN (hypertension), benign  # up some - increase to 10 mg   - amLODIPine (NORVASC) 10 MG tablet; Take 1 tablet (10 mg) by mouth daily    Review of the result(s) of each unique test - labs  Ordering of each unique test  Prescription drug management         Regular exercise  See Patient Instructions    Braxton Boone MD  [facility]   Gregoria Coombs is a 78 year old, presenting for the following health issues:  Diabetes      HPI       Diabetes Follow-up    How often are you checking your blood sugar? A few times a week  What time of day are you checking your blood sugars (select all that apply)?  Before meals  Have you had any blood sugars above 200?  No  Have you had any blood sugars below 70?  No  What symptoms do you notice when your blood sugar is low?  None  What concerns do you have today about your diabetes? None   Do you have any of these symptoms? (Select all that apply)  No numbness or tingling in feet.  No redness, sores or blisters on feet.  No complaints of  "excessive thirst.  No reports of blurry vision.  No significant changes to weight.      BP Readings from Last 2 Encounters:   09/28/23 (!) 150/56   08/02/23 (!) 175/76     Hemoglobin A1C (%)   Date Value   09/28/2023 8.3 (H)   04/21/2023 7.5 (H)   05/28/2021 8.5 (H)   02/24/2021 8.3 (H)     LDL Cholesterol Calculated (mg/dL)   Date Value   04/21/2023 40   03/04/2022 32   02/24/2021 53   05/15/2020 40               Review of Systems         Objective    BP (!) 150/56   Pulse 94   Temp 97  F (36.1  C) (Temporal)   Resp 16   Ht 1.549 m (5' 1\")   Wt 48.8 kg (107 lb 9.6 oz)   LMP  (LMP Unknown)   SpO2 95%   BMI 20.33 kg/m    Body mass index is 20.33 kg/m .  Physical Exam   GENERAL: alert and no distress  NECK: no adenopathy, no asymmetry, masses, or scars and thyroid normal to palpation  RESP: lungs clear to auscultation - no rales, rhonchi or wheezes  CV: regular rate and rhythm, normal S1 S2, no S3 or S4, no murmur, click or rub, no peripheral edema and peripheral pulses strong  MS: no gross musculoskeletal defects noted, no edema                      "

## 2023-11-10 DIAGNOSIS — G47.00 INSOMNIA, UNSPECIFIED TYPE: ICD-10-CM

## 2023-11-10 RX ORDER — TRAZODONE HYDROCHLORIDE 100 MG/1
TABLET ORAL
Qty: 90 TABLET | Refills: 0 | Status: SHIPPED | OUTPATIENT
Start: 2023-11-10 | End: 2024-10-03

## 2023-12-01 DIAGNOSIS — Z79.4 TYPE 2 DIABETES MELLITUS WITH MICROALBUMINURIA, WITH LONG-TERM CURRENT USE OF INSULIN (H): ICD-10-CM

## 2023-12-01 DIAGNOSIS — R80.9 TYPE 2 DIABETES MELLITUS WITH MICROALBUMINURIA, WITH LONG-TERM CURRENT USE OF INSULIN (H): ICD-10-CM

## 2023-12-01 DIAGNOSIS — E11.29 TYPE 2 DIABETES MELLITUS WITH MICROALBUMINURIA, WITH LONG-TERM CURRENT USE OF INSULIN (H): ICD-10-CM

## 2023-12-01 RX ORDER — PEN NEEDLE, DIABETIC 29 G X1/2"
NEEDLE, DISPOSABLE MISCELLANEOUS
Qty: 100 EACH | Refills: 0 | Status: SHIPPED | OUTPATIENT
Start: 2023-12-01 | End: 2024-02-26

## 2023-12-13 ENCOUNTER — ANCILLARY PROCEDURE (OUTPATIENT)
Dept: GENERAL RADIOLOGY | Facility: CLINIC | Age: 78
End: 2023-12-13
Attending: PHYSICIAN ASSISTANT
Payer: COMMERCIAL

## 2023-12-13 ENCOUNTER — OFFICE VISIT (OUTPATIENT)
Dept: URGENT CARE | Facility: URGENT CARE | Age: 78
End: 2023-12-13
Payer: COMMERCIAL

## 2023-12-13 VITALS
TEMPERATURE: 99 F | DIASTOLIC BLOOD PRESSURE: 63 MMHG | BODY MASS INDEX: 19.65 KG/M2 | HEART RATE: 70 BPM | OXYGEN SATURATION: 96 % | RESPIRATION RATE: 14 BRPM | SYSTOLIC BLOOD PRESSURE: 158 MMHG | WEIGHT: 104 LBS

## 2023-12-13 DIAGNOSIS — R05.1 ACUTE COUGH: Primary | ICD-10-CM

## 2023-12-13 DIAGNOSIS — R06.2 WHEEZING: ICD-10-CM

## 2023-12-13 PROCEDURE — 71046 X-RAY EXAM CHEST 2 VIEWS: CPT | Mod: TC | Performed by: PHYSICIAN ASSISTANT

## 2023-12-13 PROCEDURE — 99215 OFFICE O/P EST HI 40 MIN: CPT | Performed by: PHYSICIAN ASSISTANT

## 2023-12-13 RX ORDER — DOXYCYCLINE 100 MG/1
100 CAPSULE ORAL 2 TIMES DAILY
Qty: 20 CAPSULE | Refills: 0 | Status: SHIPPED | OUTPATIENT
Start: 2023-12-13 | End: 2023-12-23

## 2023-12-13 RX ORDER — PREDNISONE 20 MG/1
20 TABLET ORAL DAILY
Qty: 5 TABLET | Refills: 0 | Status: SHIPPED | OUTPATIENT
Start: 2023-12-13 | End: 2023-12-18

## 2023-12-13 RX ORDER — ALBUTEROL SULFATE 90 UG/1
2 AEROSOL, METERED RESPIRATORY (INHALATION) EVERY 6 HOURS PRN
Qty: 18 G | Refills: 0 | Status: SHIPPED | OUTPATIENT
Start: 2023-12-13

## 2023-12-13 NOTE — PROGRESS NOTES
Patient presents with:  Urgent Care: Persistent cough x 3 weeks and Dizziness on/off for 2 days     (R05.1) Acute cough  (primary encounter diagnosis)  Comment:   Plan: XR Chest 2 Views, doxycycline hyclate         (VIBRAMYCIN) 100 MG capsule            (R06.2) Wheezing  Comment:   Plan: albuterol (PROAIR HFA/PROVENTIL HFA/VENTOLIN         HFA) 108 (90 Base) MCG/ACT inhaler, predniSONE         (DELTASONE) 20 MG tablet            Rest.     Follow up with primary doctor should symptoms persist or worsen.        At the end of the encounter, I discussed results, diagnosis, medications. Discussed red flags for immediate return to clinic/ER, as well as indications for follow up if no improvement. Patient understood and agreed to plan. Patient was stable for discharge     If not improving or if condition worsens, follow up with your Primary Care Provider      41 minutes spent by me on the date of the encounter doing chart review, review of test results, interpretation of tests, patient visit, documentation, discussion with family, and communication via Water[employer] ipad .         SUBJECTIVE:   Gregoria Gan is a 78 year old female who presents today with a cough for the past 3 weeks.      [employer]  used to communicate during visit today.      Her  is with her and helps with HPI.   Denies any runny nose or congestion.      Denies any fevers until today.     No ill contacts.        Patient Active Problem List   Diagnosis    Autoimmune hypothyroidism    Type 2 diabetes mellitus with microalbuminuria, without long-term current use of insulin (H)    Hyperlipidemia LDL goal <100    Insomnia    CKD (chronic kidney disease) stage 3, GFR 30-59 ml/min (H)    Advanced directives, counseling/discussion    Benign hypertension with CKD (chronic kidney disease) stage III (H)    Osteopenia of multiple sites    Memory loss         Past Medical History:   Diagnosis Date    CKD (chronic kidney disease) stage  3, GFR 30-59 ml/min (H)     HTN (hypertension), benign     Other and unspecified hyperlipidemia     Type 2 diabetes, HbA1c goal < 7% (H)     Unspecified hypothyroidism          Current Outpatient Medications   Medication Sig Dispense Refill    Multiple Vitamins-Iron (DAILY-FARAZ/IRON/BETA-CAROTENE) TABS TAKE 1 TABLET BY MOUTH DAILY. (Patient not taking: Reported on 10/19/2020) 30 tablet 7     Social History     Tobacco Use    Smoking status: Never Smoker    Smokeless tobacco: Never Used   Substance Use Topics    Alcohol use: Not on file     Family History   Problem Relation Age of Onset    Diabetes Mother     Diabetes Father          ROS:    10 point ROS of systems including Constitutional, Eyes, Respiratory, Cardiovascular, Gastroenterology, Genitourinary, Integumentary, Muscularskeletal, Psychiatric ,neurological were all negative except for pertinent positives noted in my HPI       OBJECTIVE:  BP (!) 158/63 (BP Location: Right arm, Patient Position: Sitting, Cuff Size: Adult Small)   Pulse 70   Temp 99  F (37.2  C) (Tympanic)   Resp 14   Wt 47.2 kg (104 lb)   LMP  (LMP Unknown)   SpO2 96%   Breastfeeding No   BMI 19.65 kg/m    Physical Exam:  GENERAL APPEARANCE: healthy, alert and no distress  EYES: EOMI,  PERRL, conjunctiva clear  HENT: ear canals and TM's normal.  Nose and mouth without ulcers, erythema or lesions  NECK: supple, nontender, no lymphadenopathy  RESP: wheezing   CV: regular rates and rhythm, normal S1 S2, no murmur noted  ABDOMEN:  soft, nontender, no HSM or masses and bowel sounds normal  NEURO: Normal strength and tone, sensory exam grossly normal,  normal speech and mentation  SKIN: no suspicious lesions or rashes

## 2023-12-14 NOTE — RESULT ENCOUNTER NOTE
Please contact patient.  She needs a follow up with her PCP to review a finding on the CXR and may need to have additional imaging.      You will need a  to communicate with patient.  Please also make the follow up appointment for her.      THANK YOU!

## 2023-12-14 NOTE — NURSING NOTE
Pan/DANEYL Younger called this patient with Estonian  at home # and Cell # and no answer. Left message to call us back re: appt scheduled for her with her PCP 12/22/23@ 1:40p.m. for follow up visit on her last UC visit and xray results.  Brenna Cotto LPN

## 2023-12-18 NOTE — PROGRESS NOTES
services left message on pt VM to call UC. When pt returns call please inform of    Lilly Dubois PA-C message of 12/13/23.    ALYSIA Lopez LPN

## 2023-12-22 ENCOUNTER — OFFICE VISIT (OUTPATIENT)
Dept: INTERNAL MEDICINE | Facility: CLINIC | Age: 78
End: 2023-12-22
Payer: COMMERCIAL

## 2023-12-22 VITALS
WEIGHT: 103.2 LBS | HEART RATE: 86 BPM | TEMPERATURE: 99 F | RESPIRATION RATE: 20 BRPM | OXYGEN SATURATION: 95 % | HEIGHT: 61 IN | SYSTOLIC BLOOD PRESSURE: 142 MMHG | DIASTOLIC BLOOD PRESSURE: 50 MMHG | BODY MASS INDEX: 19.48 KG/M2

## 2023-12-22 DIAGNOSIS — R91.1 ABNORMAL X-RAY OF LUNGS WITH SINGLE PULMONARY NODULE: ICD-10-CM

## 2023-12-22 DIAGNOSIS — R05.2 SUBACUTE COUGH: Primary | ICD-10-CM

## 2023-12-22 DIAGNOSIS — R91.8 PULMONARY NODULES: ICD-10-CM

## 2023-12-22 PROCEDURE — 99214 OFFICE O/P EST MOD 30 MIN: CPT | Performed by: INTERNAL MEDICINE

## 2023-12-22 NOTE — PROGRESS NOTES
"  Assessment & Plan     Subacute cough  Finish antibx - though not sure this is bacterial. Seems viral or even not infectious    Abnormal x-ray of lungs with single pulmonary nodule  F/u on cxr with CT  - CT Chest w/o Contrast; Future    Review of [facility]  Review of the result(s) of each unique test - cxr  Assessment requiring an independent historian(s) - significant other -    Ordering of each unique test         See Patient Instructions    Braxton Boone MD  [facility]    Citlaly Coombs is a 78 year old, presenting for the following health issues:  UC Follow-Up      HPI       ED/UC Followup:    Facility:  [facility]  Date of visit: 12/13/2023  Reason for visit: cough  Current Status: improved with abx, but feeling fatigued and more tired, and still having some coughing and difficulty sleeping due to cough        Review of Systems         Objective    BP (!) 142/50   Pulse 86   Temp 99  F (37.2  C) (Temporal)   Resp 20   Ht 1.549 m (5' 1\")   Wt 46.8 kg (103 lb 3.2 oz)   LMP  (LMP Unknown)   SpO2 95%   BMI 19.50 kg/m    Body mass index is 19.5 kg/m .  Physical Exam   GENERAL: alert and no distress  RESP: lungs clear to auscultation - no rales, rhonchi or wheezes  CV: regular rates and rhythm, grade 2/6 diastolic murmur heard best over the R/LSB, peripheral pulses strong, and no peripheral edema                      "

## 2024-01-05 ENCOUNTER — ANCILLARY PROCEDURE (OUTPATIENT)
Dept: CT IMAGING | Facility: CLINIC | Age: 79
End: 2024-01-05
Attending: INTERNAL MEDICINE
Payer: COMMERCIAL

## 2024-01-05 DIAGNOSIS — R91.1 ABNORMAL X-RAY OF LUNGS WITH SINGLE PULMONARY NODULE: ICD-10-CM

## 2024-01-05 PROCEDURE — 71250 CT THORAX DX C-: CPT

## 2024-02-26 DIAGNOSIS — R80.9 TYPE 2 DIABETES MELLITUS WITH MICROALBUMINURIA, WITH LONG-TERM CURRENT USE OF INSULIN (H): ICD-10-CM

## 2024-02-26 DIAGNOSIS — E11.29 TYPE 2 DIABETES MELLITUS WITH MICROALBUMINURIA, WITH LONG-TERM CURRENT USE OF INSULIN (H): ICD-10-CM

## 2024-02-26 DIAGNOSIS — Z79.4 TYPE 2 DIABETES MELLITUS WITH MICROALBUMINURIA, WITH LONG-TERM CURRENT USE OF INSULIN (H): ICD-10-CM

## 2024-02-26 RX ORDER — PEN NEEDLE, DIABETIC 29 G X1/2"
NEEDLE, DISPOSABLE MISCELLANEOUS
Qty: 100 EACH | Refills: 0 | Status: SHIPPED | OUTPATIENT
Start: 2024-02-26 | End: 2024-06-25

## 2024-03-15 ENCOUNTER — OFFICE VISIT (OUTPATIENT)
Dept: INTERNAL MEDICINE | Facility: CLINIC | Age: 79
End: 2024-03-15
Payer: COMMERCIAL

## 2024-03-15 VITALS
RESPIRATION RATE: 16 BRPM | OXYGEN SATURATION: 95 % | SYSTOLIC BLOOD PRESSURE: 144 MMHG | WEIGHT: 105.8 LBS | TEMPERATURE: 99 F | BODY MASS INDEX: 19.98 KG/M2 | DIASTOLIC BLOOD PRESSURE: 65 MMHG | HEART RATE: 84 BPM | HEIGHT: 61 IN

## 2024-03-15 DIAGNOSIS — E06.3 AUTOIMMUNE HYPOTHYROIDISM: ICD-10-CM

## 2024-03-15 DIAGNOSIS — I12.9 BENIGN HYPERTENSION WITH CKD (CHRONIC KIDNEY DISEASE) STAGE III (H): ICD-10-CM

## 2024-03-15 DIAGNOSIS — N18.30 BENIGN HYPERTENSION WITH CKD (CHRONIC KIDNEY DISEASE) STAGE III (H): ICD-10-CM

## 2024-03-15 DIAGNOSIS — R80.9 TYPE 2 DIABETES MELLITUS WITH MICROALBUMINURIA, WITHOUT LONG-TERM CURRENT USE OF INSULIN (H): ICD-10-CM

## 2024-03-15 DIAGNOSIS — E11.29 TYPE 2 DIABETES MELLITUS WITH MICROALBUMINURIA, WITHOUT LONG-TERM CURRENT USE OF INSULIN (H): ICD-10-CM

## 2024-03-15 DIAGNOSIS — R91.8 PULMONARY NODULES: Primary | ICD-10-CM

## 2024-03-15 DIAGNOSIS — N18.31 STAGE 3A CHRONIC KIDNEY DISEASE (H): ICD-10-CM

## 2024-03-15 PROCEDURE — 99213 OFFICE O/P EST LOW 20 MIN: CPT | Performed by: INTERNAL MEDICINE

## 2024-03-15 NOTE — PROGRESS NOTES
"  Assessment & Plan     Pulmonary nodules  Discussed reasoning for f/u CT with pt and her     Type 2 diabetes mellitus with microalbuminuria, without long-term current use of insulin (H)  Labs April, f/u thereafter  - HEMOGLOBIN A1C; Future  - Lipid panel reflex to direct LDL Fasting; Future  - Albumin Random Urine Quantitative with Creat Ratio; Future    Benign hypertension with CKD (chronic kidney disease) stage III (H)  - Hemoglobin; Future    Stage 3a chronic kidney disease (H)  Labs     Autoimmune hypothyroidism  - TSH with free T4 reflex; Future    Assessment requiring an independent historian(s) - family -   Ordering of each unique test          See Patient Instructions    Subjective   Gregoria Coombs is a 79 year old, presenting for the following health issues:  Results    HPI       Discuss CT results from 01/2024-does have follow up scheduled 01/10/2024            Objective    BP (!) 144/65   Pulse 84   Temp 99  F (37.2  C) (Temporal)   Resp 16   Ht 1.549 m (5' 1\")   Wt 48 kg (105 lb 12.8 oz)   LMP  (LMP Unknown)   SpO2 95%   BMI 19.99 kg/m    Body mass index is 19.99 kg/m .  Physical Exam   GENERAL: alert and no distress            Signed Electronically by: Braxton Boone MD    "

## 2024-04-10 ENCOUNTER — ANCILLARY PROCEDURE (OUTPATIENT)
Dept: CT IMAGING | Facility: CLINIC | Age: 79
End: 2024-04-10
Attending: INTERNAL MEDICINE
Payer: COMMERCIAL

## 2024-04-10 DIAGNOSIS — R91.8 PULMONARY NODULES: ICD-10-CM

## 2024-04-10 PROCEDURE — 71250 CT THORAX DX C-: CPT

## 2024-04-18 ENCOUNTER — LAB (OUTPATIENT)
Dept: LAB | Facility: CLINIC | Age: 79
End: 2024-04-18
Attending: INTERNAL MEDICINE
Payer: COMMERCIAL

## 2024-04-18 DIAGNOSIS — N18.31 STAGE 3A CHRONIC KIDNEY DISEASE (H): ICD-10-CM

## 2024-04-18 DIAGNOSIS — E11.29 TYPE 2 DIABETES MELLITUS WITH MICROALBUMINURIA, WITHOUT LONG-TERM CURRENT USE OF INSULIN (H): ICD-10-CM

## 2024-04-18 DIAGNOSIS — N18.30 BENIGN HYPERTENSION WITH CKD (CHRONIC KIDNEY DISEASE) STAGE III (H): ICD-10-CM

## 2024-04-18 DIAGNOSIS — E06.3 AUTOIMMUNE HYPOTHYROIDISM: ICD-10-CM

## 2024-04-18 DIAGNOSIS — I12.9 BENIGN HYPERTENSION WITH CKD (CHRONIC KIDNEY DISEASE) STAGE III (H): ICD-10-CM

## 2024-04-18 DIAGNOSIS — R80.9 TYPE 2 DIABETES MELLITUS WITH MICROALBUMINURIA, WITHOUT LONG-TERM CURRENT USE OF INSULIN (H): ICD-10-CM

## 2024-04-18 LAB
ANION GAP SERPL CALCULATED.3IONS-SCNC: 13 MMOL/L (ref 7–15)
BUN SERPL-MCNC: 17.1 MG/DL (ref 8–23)
CALCIUM SERPL-MCNC: 10.7 MG/DL (ref 8.8–10.2)
CHLORIDE SERPL-SCNC: 105 MMOL/L (ref 98–107)
CHOLEST SERPL-MCNC: 139 MG/DL
CREAT SERPL-MCNC: 1.41 MG/DL (ref 0.51–0.95)
CREAT UR-MCNC: 54.1 MG/DL
DEPRECATED HCO3 PLAS-SCNC: 24 MMOL/L (ref 22–29)
EGFRCR SERPLBLD CKD-EPI 2021: 38 ML/MIN/1.73M2
FASTING STATUS PATIENT QL REPORTED: YES
GLUCOSE SERPL-MCNC: 126 MG/DL (ref 70–99)
HBA1C MFR BLD: 9.6 % (ref 0–5.6)
HDLC SERPL-MCNC: 41 MG/DL
HGB BLD-MCNC: 14.3 G/DL (ref 11.7–15.7)
LDLC SERPL CALC-MCNC: 59 MG/DL
MICROALBUMIN UR-MCNC: 127 MG/L
MICROALBUMIN/CREAT UR: 234.75 MG/G CR (ref 0–25)
NONHDLC SERPL-MCNC: 98 MG/DL
POTASSIUM SERPL-SCNC: 4 MMOL/L (ref 3.4–5.3)
SODIUM SERPL-SCNC: 142 MMOL/L (ref 135–145)
TRIGL SERPL-MCNC: 194 MG/DL
TSH SERPL DL<=0.005 MIU/L-ACNC: 3.48 UIU/ML (ref 0.3–4.2)

## 2024-04-18 PROCEDURE — 83036 HEMOGLOBIN GLYCOSYLATED A1C: CPT

## 2024-04-18 PROCEDURE — 80048 BASIC METABOLIC PNL TOTAL CA: CPT

## 2024-04-18 PROCEDURE — 84443 ASSAY THYROID STIM HORMONE: CPT

## 2024-04-18 PROCEDURE — 85018 HEMOGLOBIN: CPT

## 2024-04-18 PROCEDURE — 36415 COLL VENOUS BLD VENIPUNCTURE: CPT

## 2024-04-18 PROCEDURE — 82570 ASSAY OF URINE CREATININE: CPT

## 2024-04-18 PROCEDURE — 80061 LIPID PANEL: CPT

## 2024-04-18 PROCEDURE — 82043 UR ALBUMIN QUANTITATIVE: CPT

## 2024-04-21 DIAGNOSIS — I10 HTN (HYPERTENSION), BENIGN: ICD-10-CM

## 2024-04-22 RX ORDER — LOSARTAN POTASSIUM 100 MG/1
100 TABLET ORAL DAILY
Qty: 90 TABLET | Refills: 0 | Status: SHIPPED | OUTPATIENT
Start: 2024-04-22 | End: 2024-04-25

## 2024-04-25 ENCOUNTER — OFFICE VISIT (OUTPATIENT)
Dept: INTERNAL MEDICINE | Facility: CLINIC | Age: 79
End: 2024-04-25
Attending: INTERNAL MEDICINE
Payer: COMMERCIAL

## 2024-04-25 VITALS
SYSTOLIC BLOOD PRESSURE: 137 MMHG | TEMPERATURE: 98.5 F | BODY MASS INDEX: 20.03 KG/M2 | DIASTOLIC BLOOD PRESSURE: 65 MMHG | HEART RATE: 69 BPM | RESPIRATION RATE: 16 BRPM | WEIGHT: 106.1 LBS | OXYGEN SATURATION: 98 % | HEIGHT: 61 IN

## 2024-04-25 DIAGNOSIS — N18.32 STAGE 3B CHRONIC KIDNEY DISEASE (H): ICD-10-CM

## 2024-04-25 DIAGNOSIS — E21.3 HYPERPARATHYROIDISM (H): ICD-10-CM

## 2024-04-25 DIAGNOSIS — R91.8 PULMONARY NODULES: ICD-10-CM

## 2024-04-25 DIAGNOSIS — E78.5 HYPERLIPIDEMIA LDL GOAL <100: ICD-10-CM

## 2024-04-25 DIAGNOSIS — Z00.00 ENCOUNTER FOR MEDICARE ANNUAL WELLNESS EXAM: Primary | ICD-10-CM

## 2024-04-25 DIAGNOSIS — R80.9 TYPE 2 DIABETES MELLITUS WITH DIABETIC MICROALBUMINURIA, WITH LONG-TERM CURRENT USE OF INSULIN (H): ICD-10-CM

## 2024-04-25 DIAGNOSIS — N18.30 BENIGN HYPERTENSION WITH CKD (CHRONIC KIDNEY DISEASE) STAGE III (H): ICD-10-CM

## 2024-04-25 DIAGNOSIS — I12.9 BENIGN HYPERTENSION WITH CKD (CHRONIC KIDNEY DISEASE) STAGE III (H): ICD-10-CM

## 2024-04-25 DIAGNOSIS — Z79.4 TYPE 2 DIABETES MELLITUS WITH DIABETIC MICROALBUMINURIA, WITH LONG-TERM CURRENT USE OF INSULIN (H): ICD-10-CM

## 2024-04-25 DIAGNOSIS — E11.29 TYPE 2 DIABETES MELLITUS WITH DIABETIC MICROALBUMINURIA, WITH LONG-TERM CURRENT USE OF INSULIN (H): ICD-10-CM

## 2024-04-25 DIAGNOSIS — E06.3 AUTOIMMUNE HYPOTHYROIDISM: ICD-10-CM

## 2024-04-25 PROCEDURE — 99214 OFFICE O/P EST MOD 30 MIN: CPT | Mod: 25 | Performed by: INTERNAL MEDICINE

## 2024-04-25 PROCEDURE — 91320 SARSCV2 VAC 30MCG TRS-SUC IM: CPT | Performed by: INTERNAL MEDICINE

## 2024-04-25 PROCEDURE — 90480 ADMN SARSCOV2 VAC 1/ONLY CMP: CPT | Performed by: INTERNAL MEDICINE

## 2024-04-25 PROCEDURE — G0439 PPPS, SUBSEQ VISIT: HCPCS | Performed by: INTERNAL MEDICINE

## 2024-04-25 RX ORDER — METFORMIN HCL 500 MG
500 TABLET, EXTENDED RELEASE 24 HR ORAL
Qty: 90 TABLET | Refills: 3 | Status: SHIPPED | OUTPATIENT
Start: 2024-04-25

## 2024-04-25 RX ORDER — ATORVASTATIN CALCIUM 20 MG/1
20 TABLET, FILM COATED ORAL DAILY
Qty: 90 TABLET | Refills: 3 | Status: SHIPPED | OUTPATIENT
Start: 2024-04-25

## 2024-04-25 RX ORDER — LOSARTAN POTASSIUM 100 MG/1
100 TABLET ORAL DAILY
Qty: 90 TABLET | Refills: 3 | Status: SHIPPED | OUTPATIENT
Start: 2024-04-25

## 2024-04-25 RX ORDER — LEVOTHYROXINE SODIUM 75 UG/1
75 TABLET ORAL DAILY
Qty: 90 TABLET | Refills: 3 | Status: SHIPPED | OUTPATIENT
Start: 2024-04-25

## 2024-04-25 RX ORDER — INSULIN GLARGINE 100 [IU]/ML
20 INJECTION, SOLUTION SUBCUTANEOUS EVERY MORNING
Qty: 15 ML | Refills: 3 | Status: SHIPPED | OUTPATIENT
Start: 2024-04-25 | End: 2024-05-30 | Stop reason: ALTCHOICE

## 2024-04-25 SDOH — HEALTH STABILITY: PHYSICAL HEALTH: ON AVERAGE, HOW MANY MINUTES DO YOU ENGAGE IN EXERCISE AT THIS LEVEL?: 10 MIN

## 2024-04-25 SDOH — HEALTH STABILITY: PHYSICAL HEALTH: ON AVERAGE, HOW MANY DAYS PER WEEK DO YOU ENGAGE IN MODERATE TO STRENUOUS EXERCISE (LIKE A BRISK WALK)?: 7 DAYS

## 2024-04-25 ASSESSMENT — SOCIAL DETERMINANTS OF HEALTH (SDOH): HOW OFTEN DO YOU GET TOGETHER WITH FRIENDS OR RELATIVES?: MORE THAN THREE TIMES A WEEK

## 2024-04-25 NOTE — PROGRESS NOTES
Preventive Care Visit  [facility]  Braxton Boone MD, Internal Medicine  Apr 25, 2024      Assessment & Plan     Encounter for Medicare annual wellness exam  Updated screening, immunizations, prevention.  Please see health maintenance list, care gaps    Type 2 diabetes mellitus with diabetic microalbuminuria, with long-term current use of insulin (H)  A1c and home readings quite discrepant.  I recommended trialing a CGM with the professional version to see if we can find out when and if she is having considerable hyperglycemia.  I am a bit reluctant to increase her basal insulin given reported home fasting glucose readings averaging less than 120.  If the CGM finds that this is not accurate and her readings are quite a bit higher we can titrate up her insulin.  We could also consider switching her to Soliqua if most of her hyperglycemia is surrounding meals.  Would like to avoid mealtime insulin.  She has microalbuminuria though this has decreased a bit.  She is on max dose losartan and she is not interested in adding a SGLT2 inhibitor after discussion of the benefits and risks/side effects.  - Adult Diabetes Education  Referral; Future    Benign hypertension with CKD (chronic kidney disease) stage III (H)  Stage 3b chronic kidney disease (H)  -Continue ARB.  As above not interested in adding SGLT2 inhibitor.    Hyperparathyroidism (H24)  I discussed treatment or workup.  Patient and her  both are not interested.  Calcium today 10.7-continue to monitor.    Pulmonary nodules  Follow-up in the fall  - CT Chest w/o Contrast; Future    Review of the result(s) of each unique test - Labs  Assessment requiring an independent historian(s) - family -   Ordering of each unique test  Prescription drug management          See Patient Instructions    [place]   Gregoria Coombs is a 79 year old, presenting for the following:  Wellness Visit      Health Care Directive  Patient does not  have a Health Care Directive or Living Will: Discussed advance care planning with patient; information given to patient to review.    HPI       Diabetes Follow-up    How often are you checking your blood sugar? A few times a week  What time of day are you checking your blood sugars (select all that apply)?  Before meals  Have you had any blood sugars above 200?  No  Have you had any blood sugars below 70?  No  What symptoms do you notice when your blood sugar is low?  None  What concerns do you have today about your diabetes? None   Do you have any of these symptoms? (Select all that apply)  No numbness or tingling in feet.  No redness, sores or blisters on feet.  No complaints of excessive thirst.  No reports of blurry vision.  No significant changes to weight.      BP Readings from Last 2 Encounters:   04/25/24 137/65   03/15/24 (!) 144/65     Hemoglobin A1C (%)   Date Value   04/18/2024 9.6 (H)   09/28/2023 8.3 (H)   05/28/2021 8.5 (H)   02/24/2021 8.3 (H)     LDL Cholesterol Calculated (mg/dL)   Date Value   04/18/2024 59   04/21/2023 40   02/24/2021 53   05/15/2020 40         4/25/2024   General Health   How would you rate your overall physical health? (!) FAIR   Feel stress (tense, anxious, or unable to sleep) Not at all         4/25/2024   Nutrition   Diet: Regular (no restrictions)         4/25/2024   Exercise   Days per week of moderate/strenous exercise 7 days   Average minutes spent exercising at this level 10 min         4/25/2024   Social Factors   Frequency of gathering with friends or relatives More than three times a week   Worry food won't last until get money to buy more Patient declined   Food not last or not have enough money for food? Patient declined   Do you have housing?  Patient declined   Are you worried about losing your housing? Patient declined   Lack of transportation? Patient declined   Unable to get utilities (heat,electricity)? Patient declined         4/25/2024   Fall Risk   Fallen  2 or more times in the past year? No   Trouble with walking or balance? No          4/25/2024   Activities of Daily Living- Home Safety   Needs help with the following daily activites Transportation    Medication administration   Safety concerns in the home None of the above         4/25/2024   Dental   Dentist two times every year? (!) NO         4/25/2024   Hearing Screening   Hearing concerns? None of the above         4/25/2024   Driving Risk Screening   Patient/family members have concerns about driving No         4/25/2024   General Alertness/Fatigue Screening   Have you been more tired than usual lately? (!) YES         4/25/2024   Urinary Incontinence Screening   Bothered by leaking urine in past 6 months Yes         4/25/2024   TB Screening   Were you born outside of the [place]? Yes         Today's PHQ-2 Score:       4/25/2024     2:31 PM   PHQ-2 ( 1999 [employer])   Q1: Little interest or pleasure in doing things 0   Q2: Feeling down, depressed or hopeless 0   PHQ-2 Score 0   Q1: Little interest or pleasure in doing things Not at all   Q2: Feeling down, depressed or hopeless Not at all   PHQ-2 Score 0           4/25/2024   Substance Use   Alcohol more than 3/day or more than 7/wk No   Do you have a current opioid prescription? No   How severe/bad is pain from 1 to 10? 0/10 (No Pain)   Do you use any other substances recreationally? No     Social History     Tobacco Use    Smoking status: Never    Smokeless tobacco: Never   Vaping Use    Vaping status: Never Used   Substance Use Topics    Alcohol use: No     Alcohol/week: 0.0 standard drinks of alcohol    Drug use: No          Mammogram Screening - After age 74- determine frequency with patient based on health status, life expectancy and patient goals              Reviewed and updated as needed this visit by Provider                      Current providers sharing in care for this patient include:  Patient Care Team:  Braxton Boone MD as PCP -  "General  Braxton Boone MD as Assigned PCP    The following health maintenance items are reviewed in Epic and correct as of today:  Health Maintenance   Topic Date Due    DTAP/TDAP/TD IMMUNIZATION (1 - Tdap) 02/28/2012    ZOSTER IMMUNIZATION (2 of 3) 12/14/2017    DIABETIC FOOT EXAM  04/28/2024    ANNUAL REVIEW OF HM ORDERS  06/27/2024    EYE EXAM  08/28/2024    A1C  10/18/2024    BMP  04/18/2025    LIPID  04/18/2025    MICROALBUMIN  04/18/2025    TSH W/FREE T4 REFLEX  04/18/2025    HEMOGLOBIN  04/18/2025    MEDICARE ANNUAL WELLNESS VISIT  04/25/2025    FALL RISK ASSESSMENT  04/25/2025    DEXA  05/09/2025    ADVANCE CARE PLANNING  04/28/2028    PHQ-2 (once per calendar year)  Completed    INFLUENZA VACCINE  Completed    Pneumococcal Vaccine: 65+ Years  Completed    URINALYSIS  Completed    RSV VACCINE (Pregnancy & 60+)  Completed    COVID-19 Vaccine  Completed    IPV IMMUNIZATION  Aged Out    HPV IMMUNIZATION  Aged Out    MENINGITIS IMMUNIZATION  Aged Out    RSV MONOCLONAL ANTIBODY  Aged Out    MAMMO SCREENING  Discontinued    COLORECTAL CANCER SCREENING  Discontinued            Objective    Exam  /65   Pulse 69   Temp 98.5  F (36.9  C) (Temporal)   Resp 16   Ht 1.549 m (5' 1\")   Wt 48.1 kg (106 lb 1.6 oz)   LMP  (LMP Unknown)   SpO2 98%   BMI 20.05 kg/m     Estimated body mass index is 20.05 kg/m  as calculated from the following:    Height as of this encounter: 1.549 m (5' 1\").    Weight as of this encounter: 48.1 kg (106 lb 1.6 oz).    Physical Exam  GENERAL: alert and no distress  EYES: Eyes grossly normal to inspection, PERRL and conjunctivae and sclerae normal  HENT: normal cephalic/atraumatic  NECK: no adenopathy, no asymmetry, masses, or scars  RESP: lungs clear to auscultation - no rales, rhonchi or wheezes  CV: regular rate and rhythm, normal S1 S2, no S3 or S4, no murmur, click or rub, no peripheral edema  MS: no gross musculoskeletal defects noted, no edema  SKIN: no suspicious lesions " or rashes  NEURO: Normal strength and tone, mentation intact and speech normal  PSYCH: judgement and insight intact and appearance well groomed  LYMPH: no cervical adenopathy  Diabetic foot exam: normal DP and PT pulses, no trophic changes or ulcerative lesions, normal sensory exam, and normal monofilament exam        4/25/2024   Mini Cog   Mini-Cog Not Completed (choose reason) Known dementia              Signed Electronically by: Braxton Boone MD

## 2024-04-25 NOTE — PATIENT INSTRUCTIONS
Preventive Care Advice   This is general advice given by our system to help you stay healthy. However, your care team may have specific advice just for you. Please talk to your care team about your preventive care needs.  Nutrition  Eat 5 or more servings of fruits and vegetables each day.  Try wheat bread, brown rice and whole grain pasta (instead of white bread, rice, and pasta).  Get enough calcium and vitamin D. Check the label on foods and aim for 100% of the RDA (recommended daily allowance).  Lifestyle  Exercise at least 150 minutes each week   (30 minutes a day, 5 days a week).  Do muscle strengthening activities 2 days a week. These help control your weight and prevent disease.  No smoking.  Wear sunscreen to prevent skin cancer.  Have a dental exam and cleaning every 6 months.  Yearly exams  See your health care team every year to talk about:  Any changes in your health.  Any medicines your care team has prescribed.  Preventive care, family planning, and ways to prevent chronic diseases.  Shots (vaccines)   HPV shots (up to age 26), if you've never had them before.  Hepatitis B shots (up to age 59), if you've never had them before.  COVID-19 shot: Get this shot when it's due.  Flu shot: Get a flu shot every year.  Tetanus shot: Get a tetanus shot every 10 years.  Pneumococcal, hepatitis A, and RSV shots: Ask your care team if you need these based on your risk.  Shingles shot (for age 50 and up).  General health tests  Diabetes screening:  Starting at age 35, Get screened for diabetes at least every 3 years.  If you are younger than age 35, ask your care team if you should be screened for diabetes.  Cholesterol test: At age 39, start having a cholesterol test every 5 years, or more often if advised.  Bone density scan (DEXA): At age 50, ask your care team if you should have this scan for osteoporosis (brittle bones).  Hepatitis C: Get tested at least once in your life.  STIs (sexually transmitted  infections)  Before age 24: Ask your care team if you should be screened for STIs.  After age 24: Get screened for STIs if you're at risk. You are at risk for STIs (including HIV) if:  You are sexually active with more than one person.  You don't use condoms every time.  You or a partner was diagnosed with a sexually transmitted infection.  If you are at risk for HIV, ask about PrEP medicine to prevent HIV.  Get tested for HIV at least once in your life, whether you are at risk for HIV or not.  Cancer screening tests  Cervical cancer screening: If you have a cervix, begin getting regular cervical cancer screening tests at age 21. Most people who have regular screenings with normal results can stop after age 65. Talk about this with your provider.  Breast cancer scan (mammogram): If you've ever had breasts, begin having regular mammograms starting at age 40. This is a scan to check for breast cancer.  Colon cancer screening: It is important to start screening for colon cancer at age 45.  Have a colonoscopy test every 10 years (or more often if you're at risk) Or, ask your provider about stool tests like a FIT test every year or Cologuard test every 3 years.  To learn more about your testing options, visit: https://[email or web address]/848557.pdf.  For help making a decision, visit: https://bit.ly/uk28564.  Prostate cancer screening test: If you have a prostate and are age 55 to 69, ask your provider if you would benefit from a yearly prostate cancer screening test.  Lung cancer screening: If you are a current or former smoker age 50 to 80, ask your care team if ongoing lung cancer screenings are right for you.  For informational purposes only. Not to replace the advice of your health care provider. Copyright   2023 [employer]. All rights reserved. Clinically reviewed by the [facility]itions Program. [employer] [id number] - REV 01/24.    Learning About Activities of Daily Living  What are activities  of daily living?     Activities of daily living (ADLs) are the basic self-care tasks you do every day. These include eating, bathing, dressing, and moving around.  As you age, and if you have health problems, you may find that it's harder to do some of these tasks. If so, your doctor can suggest ideas that may help.  To measure what kind of help you may need, your doctor will ask how well you are able to do ADLs. Let your doctor know if there are any tasks that you are having trouble doing. This is an important first step to getting help. And when you have the help you need, you can stay as independent as possible.  How will a doctor assess your ADLs?  Asking about ADLs is part of a routine health checkup your doctor will likely do as you age. Your health check might be done in a doctor's office, in your home, or at a hospital. The goal is to find out if you are having any problems that could make it hard to care for yourself or that make it unsafe for you to be on your own.  To measure your ADLs, your doctor will ask how hard it is for you to do routine tasks. Your doctor may also want to know if you have changed the way you do a task because of a health problem. Your doctor may watch how you:  Walk back and forth.  Keep your balance while you stand or walk.  Move from sitting to standing or from a bed to a chair.  Button or unbutton a shirt or sweater.  Remove and put on your shoes.  It's common to feel a little worried or anxious if you find you can't do all the things you used to be able to do. Talking with your doctor about ADLs is a way to make sure you're as safe as possible and able to care for yourself as well as you can. You may want to bring a caregiver, friend, or family member to your checkup. They can help you talk to your doctor.  Follow-up care is a key part of your treatment and safety. Be sure to make and go to all appointments, and call your doctor if you are having problems. It's also a good idea  to know your test results and keep a list of the medicines you take.  Current as of: October 24, 2023               Content Version: 14.0    6893-3857 [employer].   Care instructions adapted under license by your healthcare professional. If you have questions about a medical condition or this instruction, always ask your healthcare professional. [employer] disclaims any warranty or liability for your use of this information.      Learning About Sleeping Well  What does sleeping well mean?     Sleeping well means getting enough sleep to feel good and stay healthy. How much sleep is enough varies among people.  The number of hours you sleep and how you feel when you wake up are both important. If you do not feel refreshed, you probably need more sleep. Another sign of not getting enough sleep is feeling tired during the day.  Experts recommend that adults get at least 7 or more hours of sleep per day. Children and older adults need more sleep.  Why is getting enough sleep important?  Getting enough quality sleep is a basic part of good health. When your sleep suffers, your physical health, mood, and your thoughts can suffer too. You may find yourself feeling more grumpy or stressed. Not getting enough sleep also can lead to serious problems, including injury, accidents, anxiety, and depression.  What might cause poor sleeping?  Many things can cause sleep problems, including:  Changes to your sleep schedule.  Stress. Stress can be caused by fear about a single event, such as giving a speech. Or you may have ongoing stress, such as worry about work or school.  Depression, anxiety, and other mental or emotional conditions.  Changes in your sleep habits or surroundings. This includes changes that happen where you sleep, such as noise, light, or sleeping in a different bed. It also includes changes in your sleep pattern, such as having jet lag or working a late shift.  Health problems, such as  "pain, breathing problems, and restless legs syndrome.  Lack of regular exercise.  Using alcohol, nicotine, or caffeine before bed.  How can you help yourself?  Here are some tips that may help you sleep more soundly and wake up feeling more refreshed.  Your sleeping area   Use your bedroom only for sleeping and sex. A bit of light reading may help you fall asleep. But if it doesn't, do your reading elsewhere in the house. Try not to use your TV, computer, smartphone, or tablet while you are in bed.  Be sure your bed is big enough to stretch out comfortably, especially if you have a sleep partner.  Keep your bedroom quiet, dark, and cool. Use curtains, blinds, or a sleep mask to block out light. To block out noise, use earplugs, soothing music, or a \"white noise\" machine.  Your evening and bedtime routine   Create a relaxing bedtime routine. You might want to take a warm shower or bath, or listen to soothing music.  Go to bed at the same time every night. And get up at the same time every morning, even if you feel tired.  What to avoid   Limit caffeine (coffee, tea, caffeinated sodas) during the day, and don't have any for at least 6 hours before bedtime.  Avoid drinking alcohol before bedtime. Alcohol can cause you to wake up more often during the night.  Try not to smoke or use tobacco, especially in the evening. Nicotine can keep you awake.  Limit naps during the day, especially close to bedtime.  Avoid lying in bed awake for too long. If you can't fall asleep or if you wake up in the middle of the night and can't get back to sleep within about 20 minutes, get out of bed and go to another room until you feel sleepy.  Avoid taking medicine right before bed that may keep you awake or make you feel hyper or energized. Your doctor can tell you if your medicine may do this and if you can take it earlier in the day.  If you can't sleep   Imagine yourself in a peaceful, pleasant scene. Focus on the details and feelings " "of being in a place that is relaxing.  Get up and do a quiet or boring activity until you feel sleepy.  Avoid drinking any liquids before going to bed to help prevent waking up often to use the bathroom.  Where can you learn more?  Go to https://www.[employer].net/patiented  Enter J942 in the search box to learn more about \"Learning About Sleeping Well.\"  Current as of: July 10, 2023               Content Version: 14.0    0632-4065 [employer].   Care instructions adapted under license by your healthcare professional. If you have questions about a medical condition or this instruction, always ask your healthcare professional. [employer] disclaims any warranty or liability for your use of this information.      Bladder Training: Care Instructions  Your Care Instructions     Bladder training is used to treat urge incontinence and stress incontinence. Urge incontinence means that the need to urinate comes on so fast that you can't get to a toilet in time. Stress incontinence means that you leak urine because of pressure on your bladder. For example, it may happen when you laugh, cough, or lift something heavy.  Bladder training can increase how long you can wait before you have to urinate. It can also help your bladder hold more urine. And it can give you better control over the urge to urinate.  It is important to remember that bladder training takes a few weeks to a few months to make a difference. You may not see results right away, but don't give up.  Follow-up care is a key part of your treatment and safety. Be sure to make and go to all appointments, and call your doctor if you are having problems. It's also a good idea to know your test results and keep a list of the medicines you take.  How can you care for yourself at home?  Work with your doctor to come up with a bladder training program that is right for you. You may use one or more of the following methods.  Delayed urination  In " "the beginning, try to keep from urinating for 5 minutes after you first feel the need to go.  While you wait, take deep, slow breaths to relax. Kegel exercises can also help you delay the need to go to the bathroom.  After some practice, when you can easily wait 5 minutes to urinate, try to wait 10 minutes before you urinate.  Slowly increase the waiting period until you are able to control when you have to urinate.  Scheduled urination  Empty your bladder when you first wake up in the morning.  Schedule times throughout the day when you will urinate.  Start by going to the bathroom every hour, even if you don't need to go.  Slowly increase the time between trips to the bathroom.  When you have found a schedule that works well for you, keep doing it.  If you wake up during the night and have to urinate, do it. Apply your schedule to waking hours only.  Kegel exercises  These tighten and strengthen pelvic muscles, which can help you control the flow of urine. (If doing these exercises causes pain, stop doing them and talk with your doctor.) To do Kegel exercises:  Squeeze your muscles as if you were trying not to pass gas. Or squeeze your muscles as if you were stopping the flow of urine. Your belly, legs, and buttocks shouldn't move.  Hold the squeeze for 3 seconds, then relax for 5 to 10 seconds.  Start with 3 seconds, then add 1 second each week until you are able to squeeze for 10 seconds.  Repeat the exercise 10 times a session. Do 3 to 8 sessions a day.  When should you call for help?  Watch closely for changes in your health, and be sure to contact your doctor if:    Your incontinence is getting worse.     You do not get better as expected.   Where can you learn more?  Go to https://www.[employer].net/patiented  Enter V684 in the search box to learn more about \"Bladder Training: Care Instructions.\"  Current as of: November 15, 2023               Content Version: 14.0    3987-1828 [employer]. "   Care instructions adapted under license by your healthcare professional. If you have questions about a medical condition or this instruction, always ask your healthcare professional. [employer] disclaims any warranty or liability for your use of this information.

## 2024-05-07 ENCOUNTER — ALLIED HEALTH/NURSE VISIT (OUTPATIENT)
Dept: EDUCATION SERVICES | Facility: CLINIC | Age: 79
End: 2024-05-07
Attending: INTERNAL MEDICINE
Payer: COMMERCIAL

## 2024-05-07 DIAGNOSIS — R80.9 TYPE 2 DIABETES MELLITUS WITH DIABETIC MICROALBUMINURIA, WITH LONG-TERM CURRENT USE OF INSULIN (H): ICD-10-CM

## 2024-05-07 DIAGNOSIS — Z79.4 TYPE 2 DIABETES MELLITUS WITH DIABETIC MICROALBUMINURIA, WITH LONG-TERM CURRENT USE OF INSULIN (H): ICD-10-CM

## 2024-05-07 DIAGNOSIS — E11.29 TYPE 2 DIABETES MELLITUS WITH DIABETIC MICROALBUMINURIA, WITH LONG-TERM CURRENT USE OF INSULIN (H): ICD-10-CM

## 2024-05-07 PROCEDURE — T1013 SIGN LANG/ORAL INTERPRETER: HCPCS | Mod: U4 | Performed by: DIETITIAN, REGISTERED

## 2024-05-07 PROCEDURE — 95250 CONT GLUC MNTR PHYS/QHP EQP: CPT | Performed by: DIETITIAN, REGISTERED

## 2024-05-07 NOTE — PATIENT INSTRUCTIONS
1. Plan to wear the LibrePro sensor for 14 days. It is okay to shower, bathe, and swim (up to 3 feet deep for 30 minutes)    2. Continue with your usual diabetes care plan - check blood sugars and take medicines, as prescribed.    3. Keep a log of what you eat and drink, when you take your medications and how much you take, and exercise you do while you are wearing the sensor.    4. Do not cover the sensor with extra adhesive (the small hole in the center of the sensor must remain uncovered)    5. Use a little extra care, especially when getting dressed or exercising, to avoid accidentally loosening or removing the sensor.     6. Remove the sensor if you need to have an MRI or CT scan.     If the LibrePro sensor comes off early, place it in a plastic bag or envelope and call your diabetes educator or bring it with you to your follow-up visit.     Return the sensor to the [facility] on 5/29. You can remove the sensor on 5/21 (2 weeks from today) and put in envelope, bring to your appointment on 5/29    Follow-up appointment: 5/29 at 9:30am    [place] [facility] for the [facility] Area:  For Your Diabetes Education and Nutrition Appointments Call:  [phone]   For Diabetes Education or Nutrition Related Questions:   Phone: [phone]  Send [facility] Message   If you need a medication refill please contact your pharmacy. Please allow 3 business days for your refills to be completed.

## 2024-05-07 NOTE — PROGRESS NOTES
Diabetes Self-Management Education & Support  Professional Continuous Glucose Monitor Insertion    SUBJECTIVE/OBJECTIVE:     Gregoria Gan presents for professional Continuous Glucose Monitor Insertion.     Patient comments/concerns: Fasting and pre-lunch glucose at or near goal, pre-dinner glucose very elevated without explanation (no snacks, no caloric bevs)    Lab Results:  Lab Results   Component Value Date    A1C 9.6 04/18/2024    A1C 8.5 05/28/2021      Lab Results   Component Value Date     04/18/2024     10/28/2022     02/24/2021       Medication:  Diabetes Medication(s)       Biguanides       metFORMIN (GLUCOPHAGE XR) 500 MG 24 hr tablet Take 1 tablet (500 mg) by mouth daily (with dinner)       Insulin       insulin glargine (LANTUS SOLOSTAR) 100 UNIT/ML pen Inject 20 Units Subcutaneous every morning            ASSESSMENT:    CGM study indicated for: Unexplained fluctuations in glucose values     INTERVENTION:   Sensor started today.     Sensor Type: LibrePro  Lot #: [id number]  Serial #: [id number]  Expiration Date: 06/30/24       Sensor was inserted with no resistance or bleeding at insertion site.      Pt will plan to wear the sensor through 5/21/24.    WRITTEN AND VERBAL INFORMATION GIVEN TO SUPPORT UNDERSTANDING OF:  LibrePro CGM: Sensor insertion, intention of monitoring for 14 days. Keep records of BG, food intake, exercise, and medication dosing during wear.       Patient verbalizes understanding of how to remove sensor, if needed, and all instructions provided.     Educational and other materials:  Food/exercise/medication log sheets  Contact information    PLAN:  Pt was given instructions for tracking BG, medications, food intake and activity.  Patient to return all items associated with the professional Continuous Glucose Monitor System.  See Patient Instructions, AVS printed and provided to patient today.    Follow-up:    Follow up on 5/29 at [facility] with Carey  SABI Mcmahon.    Caitlin Amin RD, LD, CDCES  Time spent in DSMT: 10 minutes   Time spent in CGM insertion: 10 minutes, in addition to time spent in DSMT  Encounter Type: Individual

## 2024-05-07 NOTE — LETTER
5/7/2024         RE: Gregoria Gan  [address] [address]        Dear Colleague,    Thank you for referring your patient, Gregoria Gan, to the [facility] SPECIALTY [facility]. Please see a copy of my visit note below.    Diabetes Self-Management Education & Support  Professional Continuous Glucose Monitor Insertion    SUBJECTIVE/OBJECTIVE:     Gregoria Gan presents for professional Continuous Glucose Monitor Insertion.     Patient comments/concerns: Fasting and pre-lunch glucose at or near goal, pre-dinner glucose very elevated without explanation (no snacks, no caloric bevs)    Lab Results:  Lab Results   Component Value Date    A1C 9.6 04/18/2024    A1C 8.5 05/28/2021      Lab Results   Component Value Date     04/18/2024     10/28/2022     02/24/2021       Medication:  Diabetes Medication(s)       Biguanides       metFORMIN (GLUCOPHAGE XR) 500 MG 24 hr tablet Take 1 tablet (500 mg) by mouth daily (with dinner)       Insulin       insulin glargine (LANTUS SOLOSTAR) 100 UNIT/ML pen Inject 20 Units Subcutaneous every morning            ASSESSMENT:    CGM study indicated for: Unexplained fluctuations in glucose values     INTERVENTION:   Sensor started today.     Sensor Type: LibrePro  Lot #: [id number]  Serial #: [id number]  Expiration Date: 06/30/24       Sensor was inserted with no resistance or bleeding at insertion site.      Pt will plan to wear the sensor through 5/21/24.    WRITTEN AND VERBAL INFORMATION GIVEN TO SUPPORT UNDERSTANDING OF:  LibrePro CGM: Sensor insertion, intention of monitoring for 14 days. Keep records of BG, food intake, exercise, and medication dosing during wear.       Patient verbalizes understanding of how to remove sensor, if needed, and all instructions provided.     Educational and other materials:  Food/exercise/medication log sheets  Contact information    PLAN:  Pt was given instructions for tracking BG, medications,  food intake and activity.  Patient to return all items associated with the professional Continuous Glucose Monitor System.  See Patient Instructions, AVS printed and provided to patient today.    Follow-up:    Follow up on 5/29 at [facility] with SABI Reyes.    Caitlin Amin RD, LD, SABI  Time spent in DSMT: 10 minutes   Time spent in CGM insertion: 10 minutes, in addition to time spent in DSMT  Encounter Type: Individual

## 2024-05-21 ENCOUNTER — TELEPHONE (OUTPATIENT)
Dept: PHARMACY | Facility: OTHER | Age: 79
End: 2024-05-21
Payer: COMMERCIAL

## 2024-05-21 ENCOUNTER — APPOINTMENT (OUTPATIENT)
Dept: INTERPRETER SERVICES | Facility: CLINIC | Age: 79
End: 2024-05-21
Payer: COMMERCIAL

## 2024-05-21 NOTE — TELEPHONE ENCOUNTER
MTM Recruitment: [facility]     Referral outreach attempt #1 on May 21, 2024      Outcome: Contacted . left voicemail- Call back number [phone]    Inocencia Fernandez PharmD BCPS  Medication Therapy Management Practitioner  Pharmacist

## 2024-05-29 ENCOUNTER — TELEPHONE (OUTPATIENT)
Dept: EDUCATION SERVICES | Facility: CLINIC | Age: 79
End: 2024-05-29

## 2024-05-29 ENCOUNTER — OFFICE VISIT (OUTPATIENT)
Dept: EDUCATION SERVICES | Facility: CLINIC | Age: 79
End: 2024-05-29
Payer: COMMERCIAL

## 2024-05-29 DIAGNOSIS — E11.29 TYPE 2 DIABETES MELLITUS WITH DIABETIC MICROALBUMINURIA, WITH LONG-TERM CURRENT USE OF INSULIN (H): Primary | ICD-10-CM

## 2024-05-29 DIAGNOSIS — Z79.4 TYPE 2 DIABETES MELLITUS WITH DIABETIC MICROALBUMINURIA, WITH LONG-TERM CURRENT USE OF INSULIN (H): Primary | ICD-10-CM

## 2024-05-29 DIAGNOSIS — R80.9 TYPE 2 DIABETES MELLITUS WITH DIABETIC MICROALBUMINURIA, WITH LONG-TERM CURRENT USE OF INSULIN (H): Primary | ICD-10-CM

## 2024-05-29 PROCEDURE — G0108 DIAB MANAGE TRN  PER INDIV: HCPCS | Performed by: NUTRITIONIST

## 2024-05-29 RX ORDER — KETOROLAC TROMETHAMINE 30 MG/ML
1 INJECTION, SOLUTION INTRAMUSCULAR; INTRAVENOUS ONCE
Qty: 1 EACH | Refills: 0 | Status: SHIPPED | OUTPATIENT
Start: 2024-05-29 | End: 2024-05-29

## 2024-05-29 RX ORDER — INSULIN GLARGINE AND LIXISENATIDE 100; 33 U/ML; UG/ML
15 INJECTION, SOLUTION SUBCUTANEOUS
Qty: 15 ML | Refills: 1 | Status: SHIPPED | OUTPATIENT
Start: 2024-05-29 | End: 2024-06-17

## 2024-05-29 RX ORDER — BLOOD-GLUCOSE SENSOR
1 EACH MISCELLANEOUS
Qty: 6 EACH | Refills: 5 | Status: SHIPPED | OUTPATIENT
Start: 2024-05-29

## 2024-05-29 NOTE — PATIENT INSTRUCTIONS
Magdiel Kinney,    Here are some things that we discussed today.      Goals for Diabetes Care:     Limit rice and noodles at meal to 1 cup cooked and 1/2 cup (1 scoop) of ice cream   Have fruit between meals  3.    Aim to eat a balanced plate - half your plate fruits/veggies, 1/4 the plate protein and 1/4 plate starch (rice, potato, pasta)    2. Check blood sugars 1 time each day at varying times   Blood Glucose Targets:   1. Fasting and before meal target is 80 - 130 mg/dl   2. 2 hours after a meal target is < 180 mg/dl  Always remember to bring meter and/or log book to all appointments.    3. Activity really helps improve blood sugars. Try to Incorporate 30 minutes activity into each day - does not need to be all at one time & walking counts!    4. Treating low BG. Rule of 15 = BG 50-70 mg/dL = 15 gram carb (4 oz juice, 4 glucose tabs, OR 4 oz pop), then recheck BG in 15 minutes. If still low repeat. (If BG <50 mg/dL = 8 oz pop/juice or 8 glucose tabs).    5. Take diabetes medications as prescribed   -Lantus 20 units AM  -Metformin  mg with dinner    Follow up with your Diabetic Educator to assess BG targets and need for modifications to medications and/or lifestyle.    Rose 3 Instructions:     1.  The first hour after you place the sensor is the warm up period (black out period).  You will need to carry your blood glucose meter and check blood glucose during this time.     2.  Check blood sugar if feeling symptoms of hypoglycemia but meter is not displaying a low number- may be some variability between sensor and meter at times.     3.  Change sensor every 14 days.     4.  Read/scan blood sugars every 8 hours to ensure entirety of data is available and keep phone/reader near you.     Tip: Scan before each meal and at bedtime.     5.  Watch trend arrows- will let you know if blood sugars are rising, falling or stable at the time you check.     6.  Rose 3 have alarms. You can adjust both the high and low blood  glucose alarm (when they will go off) or turn off high blood glucose alarm if alarming too much.     You cannot turn off the critical low blood glucose alarm     7.  You can shower, bathe, and swim with sensor on. Do not get reader wet.     8.  Remove the sensor if you need to have an MRI or CT scan.     9.  Do not cover the sensor with extra adhesive (the small hole in the center of the sensor must remain uncovered), unless it is made for the Rose.  If you have trouble with sensor falling off, these are approved products to help with sensor adhesion: Torbot Skin Tac, SKIN-PREP Protective Barrier Wipe and Mastisol Liquid Adhesive     10.  Check the dose if you take a multivitamin or Vitamin C (ascorbic acid). You want to limit daily intake of ascorbic acid to 500 mg/day or less, or it may falsely raise sensor glucose readings. This is more of a concern if you are on insulin or medication that can cause low blood glucose as you may miss a severe low glucose event.         Main Powered[employer]ytics Rose website:   https://[email or web address].abbott/us-en/home.[email or web address]    If you go to the support tab you can pick Rose 3.  From this page you can access instructions and how to videos.       Support:   If you have any trouble with use or if the sensor falls off early, call the customer service number for Rose located on the sensor's box. They can often help troubleshoot or replace sensor if needed. Keep your Rose sensor box with lot and/or serial numbers.    Website for rose replacement due to failure or falling off:   https://www.Histros.abbott/us-en/support/sensor-support-form-questions.html    Rose Customer Service: [phone]  (7 days a week 8am-8pm ET excluding holidays)       If you are using your phone you can connect and share your data with [employer].    In the phone gabriella go into menu --> connected apps --> libreview -->connect to a practice -->enter practice ID --> [id number]       Call with any  questions.  Thank you!  Carey Chávez RD, LD, [facility]   Certified Diabetes Care &   [facility]  Triage [phone] or Scheduling [phone]

## 2024-05-29 NOTE — PROGRESS NOTES
Diabetes Self-Management Education & Support    Presents for: Professional CGM Start/Follow-up on 14 day Rose Pro    Type of Service: In Person Visit      REPORTS:              Pt verbalized understanding of concepts discussed and recommendations provided today.       Continue education with the following diabetes management concepts: Being Active, Monitoring, Taking Medication, Problem Solving, and Reducing Risks    ASSESSMENT:  Met with Gregoria with  and .  She was able to wear the Rose pro sensor for 100% of the time.  Elevations around meals.  Review of diet shows high carbohydrate intake at lunch and dinner meals.  She had a few readings go below 80 mg/dL after breakfast but reports that she did not have any symptoms.  She is agreeable to add or change her diabetes medication. Discussed use of Soliqua to replace Lantus and continue with metformin.  They are also in agreement to start using CGM if covered by insurance.      Glucose Patterns & Trends:  Time in range of  mg/dL, 52% of the time. and Time below range of 54-69 mg/dL, 0% of the time.  PLAN    Limit rice and noodles at meal to 1 cup cooked and 1/2 cup (1 scoop) of ice cream  Have fruit between meals  3.   Continue to monitor with finger sticks once daily, either fasting blood sugar or 2 hr pp one meal/day and bring to next appointment.  4.   If able to place and pair CGM then discontinue finger sticks    Topics to cover at upcoming visits: Healthy Eating and Problem Solving    Follow-up: 6/27/24    See Care Plan for co-developed, patient-state behavior change goals.  AVS provided for patient today.    Education Materials Provided:  No new materials provided today      SUBJECTIVE/OBJECTIVE:  Presents for: Professional CGM Start  Accompanied by: Self, Spouse  Diabetes education in the past 24mo: No  Focus of Visit: CGM  Type of CGM visit: Professional CGM  Diabetes type: Type 2  Disease course: Getting harder to manage  Other  "concerns:: Language barrier  Cultural Influences/Ethnic Background:  Not  or     Diabetes Symptoms & Complications:  Disease course: Getting harder to manage  Complications assessed today?: No    Patient Problem List and Family Medical History reviewed for relevant medical history, current medical status, and diabetes risk factors.    Vitals:  LMP  (LMP Unknown)   Estimated body mass index is 20.05 kg/m  as calculated from the following:    Height as of 4/25/24: 1.549 m (5' 1\").    Weight as of 4/25/24: 48.1 kg (106 lb 1.6 oz).   Last 3 BP:   BP Readings from Last 3 Encounters:   04/25/24 137/65   03/15/24 (!) 144/65   12/22/23 (!) 142/50       History   Smoking Status    Never   Smokeless Tobacco    Never       Labs:  Lab Results   Component Value Date    A1C 9.6 04/18/2024    A1C 8.5 05/28/2021     Lab Results   Component Value Date     04/18/2024     10/28/2022     02/24/2021     Lab Results   Component Value Date    LDL 59 04/18/2024    LDL 53 02/24/2021     HDL Cholesterol   Date Value Ref Range Status   02/24/2021 52 >49 mg/dL Final     Direct Measure HDL   Date Value Ref Range Status   04/18/2024 41 (L) >=50 mg/dL Final   ]  GFR Estimate   Date Value Ref Range Status   04/18/2024 38 (L) >60 mL/min/1.73m2 Final   02/24/2021 52 (L) >60 mL/min/[1.73_m2] Final     Comment:     Non  GFR Calc  Starting 12/18/2018, serum creatinine based estimated GFR (eGFR) will be   calculated using the Chronic Kidney Disease Epidemiology Collaboration   (CKD-EPI) equation.       GFR Estimate If Black   Date Value Ref Range Status   02/24/2021 60 (L) >60 mL/min/[1.73_m2] Final     Comment:      GFR Calc  Starting 12/18/2018, serum creatinine based estimated GFR (eGFR) will be   calculated using the Chronic Kidney Disease Epidemiology Collaboration   (CKD-EPI) equation.       Lab Results   Component Value Date    CR 1.41 04/18/2024    CR 1.04 02/24/2021     No " "results found for: \"MICROALBUMIN\"    Healthy Eating:  Meals include:  Breakfast, Lunch, Dinner  Breakfast:  banana butter cake 4\" x 4\"  Lunch:  Roasted duck soup and 1 cup ice cream OR 1-1/2 cups rice with pork and 1 cup ice cream OR rice noodles 2 cups with grilled shrimp   Dinner:  Same as lunch  Snacks:  fruit  Other:  Does not like or eat vegetables        Taking Medications:  Diabetes Medication(s)       Biguanides       metFORMIN (GLUCOPHAGE XR) 500 MG 24 hr tablet Take 1 tablet (500 mg) by mouth daily (with dinner)       Insulin       insulin glargine (LANTUS SOLOSTAR) 100 UNIT/ML pen Inject 20 Units Subcutaneous every morning               Patient Activation Measure Survey Score:      5/2/2011     3:00 PM   JOEY Score (Last Two)   JOEY Raw Score 46   Activation Score 75.3   JOEY Level 4         Care Plan and Education Provided:  Healthy Eating: Consistency in amount and timing of carbohydrate intake, Plate planning method, and Portion control    Carey Chávez, FARHAD, LD, [facility]     Time Spent: 60 minutes  Encounter Type: Individual    Any diabetes medication dose changes were made via the CDE Protocol per the patient's referring provider. A copy of this encounter was shared with the provider.   "

## 2024-05-29 NOTE — LETTER
5/29/2024         RE: Gregoria Gan  [address] [address]        Dear Colleague,    Thank you for referring your patient, Gregoria Gan, to the [facility]. Please see a copy of my visit note below.    Diabetes Self-Management Education & Support    Presents for: Professional CGM Start/Follow-up on 14 day Rose Pro    Type of Service: In Person Visit      REPORTS:              Pt verbalized understanding of concepts discussed and recommendations provided today.       Continue education with the following diabetes management concepts: Being Active, Monitoring, Taking Medication, Problem Solving, and Reducing Risks    ASSESSMENT:  Met with Gregoria with  and .  She was able to wear the Rose pro sensor for 100% of the time.  Elevations around meals.  Review of diet shows high carbohydrate intake at lunch and dinner meals.  She had a few readings go below 80 mg/dL after breakfast but reports that she did not have any symptoms.  She is agreeable to add or change her diabetes medication. Discussed use of Soliqua to replace Lantus and continue with metformin.  They are also in agreement to start using CGM if covered by insurance.      Glucose Patterns & Trends:  Time in range of  mg/dL, 52% of the time. and Time below range of 54-69 mg/dL, 0% of the time.  PLAN    Limit rice and noodles at meal to 1 cup cooked and 1/2 cup (1 scoop) of ice cream  Have fruit between meals  3.   Continue to monitor with finger sticks once daily, either fasting blood sugar or 2 hr pp one meal/day and bring to next appointment.  4.   If able to place and pair CGM then discontinue finger sticks    Topics to cover at upcoming visits: Healthy Eating and Problem Solving    Follow-up: 6/27/24    See Care Plan for co-developed, patient-state behavior change goals.  AVS provided for patient today.    Education Materials Provided:  No new materials provided  "today      SUBJECTIVE/OBJECTIVE:  Presents for: Professional CGM Start  Accompanied by: Self, Spouse  Diabetes education in the past 24mo: No  Focus of Visit: CGM  Type of CGM visit: Professional CGM  Diabetes type: Type 2  Disease course: Getting harder to manage  Other concerns:: Language barrier  Cultural Influences/Ethnic Background:  Not  or     Diabetes Symptoms & Complications:  Disease course: Getting harder to manage  Complications assessed today?: No    Patient Problem List and Family Medical History reviewed for relevant medical history, current medical status, and diabetes risk factors.    Vitals:  LMP  (LMP Unknown)   Estimated body mass index is 20.05 kg/m  as calculated from the following:    Height as of 4/25/24: 1.549 m (5' 1\").    Weight as of 4/25/24: 48.1 kg (106 lb 1.6 oz).   Last 3 BP:   BP Readings from Last 3 Encounters:   04/25/24 137/65   03/15/24 (!) 144/65   12/22/23 (!) 142/50       History   Smoking Status     Never   Smokeless Tobacco     Never       Labs:  Lab Results   Component Value Date    A1C 9.6 04/18/2024    A1C 8.5 05/28/2021     Lab Results   Component Value Date     04/18/2024     10/28/2022     02/24/2021     Lab Results   Component Value Date    LDL 59 04/18/2024    LDL 53 02/24/2021     HDL Cholesterol   Date Value Ref Range Status   02/24/2021 52 >49 mg/dL Final     Direct Measure HDL   Date Value Ref Range Status   04/18/2024 41 (L) >=50 mg/dL Final   ]  GFR Estimate   Date Value Ref Range Status   04/18/2024 38 (L) >60 mL/min/1.73m2 Final   02/24/2021 52 (L) >60 mL/min/[1.73_m2] Final     Comment:     Non  GFR Calc  Starting 12/18/2018, serum creatinine based estimated GFR (eGFR) will be   calculated using the Chronic Kidney Disease Epidemiology Collaboration   (CKD-EPI) equation.       GFR Estimate If Black   Date Value Ref Range Status   02/24/2021 60 (L) >60 mL/min/[1.73_m2] Final     Comment:      " "GFR Calc  Starting 12/18/2018, serum creatinine based estimated GFR (eGFR) will be   calculated using the Chronic Kidney Disease Epidemiology Collaboration   (CKD-EPI) equation.       Lab Results   Component Value Date    CR 1.41 04/18/2024    CR 1.04 02/24/2021     No results found for: \"MICROALBUMIN\"    Healthy Eating:  Meals include:  Breakfast, Lunch, Dinner  Breakfast:  banana butter cake 4\" x 4\"  Lunch:  Roasted duck soup and 1 cup ice cream OR 1-1/2 cups rice with pork and 1 cup ice cream OR rice noodles 2 cups with grilled shrimp   Dinner:  Same as lunch  Snacks:  fruit  Other:  Does not like or eat vegetables        Taking Medications:  Diabetes Medication(s)       Biguanides       metFORMIN (GLUCOPHAGE XR) 500 MG 24 hr tablet Take 1 tablet (500 mg) by mouth daily (with dinner)       Insulin       insulin glargine (LANTUS SOLOSTAR) 100 UNIT/ML pen Inject 20 Units Subcutaneous every morning               Patient Activation Measure Survey Score:      5/2/2011     3:00 PM   JOEY Score (Last Two)   JOEY Raw Score 46   Activation Score 75.3   JOEY Level 4         Care Plan and Education Provided:  Healthy Eating: Consistency in amount and timing of carbohydrate intake, Plate planning method, and Portion control    Carey Chávez, FARHAD, LD, [facility]     Time Spent: 60 minutes  Encounter Type: Individual    Any diabetes medication dose changes were made via the CDE Protocol per the patient's referring provider. A copy of this encounter was shared with the provider.       "

## 2024-05-30 ENCOUNTER — TELEPHONE (OUTPATIENT)
Dept: PHARMACY | Facility: OTHER | Age: 79
End: 2024-05-30
Payer: COMMERCIAL

## 2024-05-30 NOTE — TELEPHONE ENCOUNTER
MTM Recruitment: [facility] insurance     Referral outreach attempt #3 on May 30, 2024      Outcome: left voicemail- Call back number [phone]    Antony TurnerD, BCPS  Pediatric Medication Therapy Management Pharmacist-Solid Organ Transplant

## 2024-06-04 ENCOUNTER — TELEPHONE (OUTPATIENT)
Dept: INTERNAL MEDICINE | Facility: CLINIC | Age: 79
End: 2024-06-04
Payer: COMMERCIAL

## 2024-06-04 NOTE — TELEPHONE ENCOUNTER
[facility] Recruitment: Replaced by [facility]     Referral outreach attempt #4 on June 4, 2024      Outcome: call attempted, could not leave voicemail   Marielena Flores [facility]  -[facility]  [phone]

## 2024-06-13 DIAGNOSIS — Z79.4 TYPE 2 DIABETES MELLITUS WITH DIABETIC MICROALBUMINURIA, WITH LONG-TERM CURRENT USE OF INSULIN (H): ICD-10-CM

## 2024-06-13 DIAGNOSIS — E11.29 TYPE 2 DIABETES MELLITUS WITH DIABETIC MICROALBUMINURIA, WITH LONG-TERM CURRENT USE OF INSULIN (H): ICD-10-CM

## 2024-06-13 DIAGNOSIS — R80.9 TYPE 2 DIABETES MELLITUS WITH DIABETIC MICROALBUMINURIA, WITH LONG-TERM CURRENT USE OF INSULIN (H): ICD-10-CM

## 2024-06-13 NOTE — TELEPHONE ENCOUNTER
Triage:    See message from the pharmacy below regarding insulin glargine-lixisenatide (SOLIQUA) pen. Can we please contact the pharmacy to see if they are able to provide a list of alternatives that would be covered by insurance?    Thank you,  Madison Pichardo RN

## 2024-06-13 NOTE — TELEPHONE ENCOUNTER
Per pharmacy, Soliqua product is not covered due to being a combination insulin.     If patient is to take Soliqua, PA will need to be completed.     If PA is not going to be completed, please send script for Lantus or Basaglar.    Per 5/29 OV with Diabetic Education:  She is agreeable to add or change her diabetes medication. Discussed use of Soliqua to replace Lantus and continue with metformin.

## 2024-06-14 RX ORDER — INSULIN GLARGINE AND LIXISENATIDE 100; 33 U/ML; UG/ML
15 INJECTION, SOLUTION SUBCUTANEOUS
Qty: 15 ML | Refills: 1 | Status: CANCELLED | OUTPATIENT
Start: 2024-06-14

## 2024-06-17 RX ORDER — INSULIN GLARGINE 100 [IU]/ML
20 INJECTION, SOLUTION SUBCUTANEOUS EVERY MORNING
Status: SHIPPED
Start: 2024-06-17 | End: 2024-06-25

## 2024-06-17 NOTE — TELEPHONE ENCOUNTER
Patient Contact    Attempt # 1 with help of      Was call answered?  No.  Left message on voicemail with information to call me back.    Upon call back, please relay provider note below to pt.     Thank you,  Mira Gannon RN

## 2024-06-17 NOTE — TELEPHONE ENCOUNTER
Call /pt- insurance doesn't cover soliqua.  Continue lantus   Can try adding GLP1 in addition (this is similar to soliqua but both drugs have to be taken separately vs in one inj with soliqua)  $$ can be an issue here though- insurance will cover with PA but may run into higher costs later in yr due to medicare.  Taken weekly vs daily in most cases  If ok with this I can order- if they are concerned with cost  we could look at changing her insulin to a different type of insulin that is taken twice daily at different doses at each time that might be more helpful than current rx.

## 2024-06-18 NOTE — TELEPHONE ENCOUNTER
Patient Contact    Attempt # 2 with interpretor    Was call answered?  No.  Left message on voicemail with information to call me back.

## 2024-06-19 NOTE — TELEPHONE ENCOUNTER
Attempted to contact pt to relay providers message from below via . No answer. Left VM to call us back.     On callback- please relay providers message from below.      Since this was attempt #3 at reaching pt but unsuccessful, routing to PCP to advise if further action is needed.       Patient Contact    Attempt # 1    Was call answered?  No.  Left message on voicemail with information to call me back.

## 2024-06-21 NOTE — TELEPHONE ENCOUNTER
Pts  walked into the clinic asking about the medication. Informed pts  to call us back with pt on the phone so she can give us verbal consent and to ask for an .     On callback- please relay Dr Boone's message from below with an .

## 2024-06-22 DIAGNOSIS — R80.9 TYPE 2 DIABETES MELLITUS WITH MICROALBUMINURIA, WITH LONG-TERM CURRENT USE OF INSULIN (H): ICD-10-CM

## 2024-06-22 DIAGNOSIS — Z79.4 TYPE 2 DIABETES MELLITUS WITH MICROALBUMINURIA, WITH LONG-TERM CURRENT USE OF INSULIN (H): ICD-10-CM

## 2024-06-22 DIAGNOSIS — E11.29 TYPE 2 DIABETES MELLITUS WITH MICROALBUMINURIA, WITH LONG-TERM CURRENT USE OF INSULIN (H): ICD-10-CM

## 2024-06-24 NOTE — TELEPHONE ENCOUNTER
Pt along with her  An called - connected to [place]  and verified with patient that we can speak to . Provider's message given. Pt will continue on daily Lantus. Ok to order GLP1 and see if that can get covered. If not, then ok with the other twice daily insulin option.     Please note pt taking new OTC meds:     Vitamin B12, 1,000 mcg - one a day for supplementation.  K2 100 mcg one pill one every other day - to help absorb calcium and prevent calcium entering the blood stream and harming the heart.    Vitamin D 25 mcg - 1000 units - bought according to Dr. Boone's advice.   Probiotic - one pill a day.      Pended - please sign as OTC patient reported - historical.

## 2024-06-25 RX ORDER — ASCORBIC ACID 125 MG
100 TABLET,CHEWABLE ORAL EVERY OTHER DAY
COMMUNITY
Start: 2024-06-24

## 2024-06-25 RX ORDER — INSULIN GLARGINE 100 [IU]/ML
20 INJECTION, SOLUTION SUBCUTANEOUS EVERY MORNING
Qty: 15 ML | Refills: 11 | Status: SHIPPED | OUTPATIENT
Start: 2024-06-25 | End: 2024-09-16

## 2024-06-25 RX ORDER — PEN NEEDLE, DIABETIC 29 G X1/2"
NEEDLE, DISPOSABLE MISCELLANEOUS
Qty: 100 EACH | Refills: 1 | Status: SHIPPED | OUTPATIENT
Start: 2024-06-25

## 2024-06-25 RX ORDER — LANOLIN ALCOHOL/MO/W.PET/CERES
1000 CREAM (GRAM) TOPICAL DAILY
COMMUNITY
Start: 2024-06-24

## 2024-06-25 RX ORDER — VITAMIN B COMPLEX
1 TABLET ORAL DAILY
COMMUNITY
Start: 2024-06-24

## 2024-06-25 RX ORDER — ZINC OXIDE 13 %
1 CREAM (GRAM) TOPICAL DAILY
COMMUNITY
Start: 2024-06-24

## 2024-06-26 DIAGNOSIS — E11.29 TYPE 2 DIABETES MELLITUS WITH DIABETIC MICROALBUMINURIA, WITH LONG-TERM CURRENT USE OF INSULIN (H): ICD-10-CM

## 2024-06-26 DIAGNOSIS — R80.9 TYPE 2 DIABETES MELLITUS WITH DIABETIC MICROALBUMINURIA, WITH LONG-TERM CURRENT USE OF INSULIN (H): ICD-10-CM

## 2024-06-26 DIAGNOSIS — Z79.4 TYPE 2 DIABETES MELLITUS WITH DIABETIC MICROALBUMINURIA, WITH LONG-TERM CURRENT USE OF INSULIN (H): ICD-10-CM

## 2024-07-05 ENCOUNTER — OFFICE VISIT (OUTPATIENT)
Dept: URGENT CARE | Facility: URGENT CARE | Age: 79
End: 2024-07-05
Payer: COMMERCIAL

## 2024-07-05 ENCOUNTER — ANCILLARY PROCEDURE (OUTPATIENT)
Dept: GENERAL RADIOLOGY | Facility: CLINIC | Age: 79
End: 2024-07-05
Attending: FAMILY MEDICINE
Payer: COMMERCIAL

## 2024-07-05 VITALS
TEMPERATURE: 98.2 F | BODY MASS INDEX: 19.65 KG/M2 | RESPIRATION RATE: 18 BRPM | HEART RATE: 95 BPM | OXYGEN SATURATION: 94 % | DIASTOLIC BLOOD PRESSURE: 66 MMHG | WEIGHT: 104 LBS | SYSTOLIC BLOOD PRESSURE: 162 MMHG

## 2024-07-05 DIAGNOSIS — R05.1 ACUTE COUGH: ICD-10-CM

## 2024-07-05 DIAGNOSIS — R05.1 ACUTE COUGH: Primary | ICD-10-CM

## 2024-07-05 PROCEDURE — 99214 OFFICE O/P EST MOD 30 MIN: CPT | Performed by: FAMILY MEDICINE

## 2024-07-05 PROCEDURE — 71046 X-RAY EXAM CHEST 2 VIEWS: CPT | Mod: TC | Performed by: RADIOLOGY

## 2024-07-05 RX ORDER — DOXYCYCLINE 100 MG/1
100 TABLET ORAL 2 TIMES DAILY
Qty: 14 TABLET | Refills: 0 | Status: SHIPPED | OUTPATIENT
Start: 2024-07-05 | End: 2024-07-12

## 2024-07-05 NOTE — PROGRESS NOTES
SUBJECTIVE: Gregoria Gan is a 79 year old female presenting with a chief complaint of cough .  Onset of symptoms was 3 week(s) ago.  Predisposing factors include None.    Past Medical History:   Diagnosis Date    CKD (chronic kidney disease) stage 3, GFR 30-59 ml/min (H)     HTN (hypertension), benign     Other and unspecified hyperlipidemia     Type 2 diabetes, HbA1c goal < 7% (H)     Unspecified hypothyroidism      No Known Allergies  Social History     Tobacco Use    Smoking status: Never    Smokeless tobacco: Never   Substance Use Topics    Alcohol use: No     Alcohol/week: 0.0 standard drinks of alcohol       ROS:  SKIN: no rash  GI: no vomiting    OBJECTIVE:  BP (!) 162/66 (BP Location: Left arm)   Pulse 95   Temp 98.2  F (36.8  C) (Tympanic)   Resp 18   Wt 47.2 kg (104 lb)   LMP  (LMP Unknown)   SpO2 94%   BMI 19.65 kg/m  GENERAL APPEARANCE: healthy, alert and no distress  EYES: EOMI,  PERRL, conjunctiva clear  HENT: ear canals and TM's normal.  Nose and mouth without ulcers, erythema or lesions  RESP: lungs clear to auscultation - no rales, rhonchi or wheezes  SKIN: no suspicious lesions or rashes      ICD-10-CM    1. Acute cough  R05.1 XR Chest 2 Views     doxycycline monohydrate (ADOXA) 100 MG tablet        Fluids/Rest, f/u if worse/not any better

## 2024-07-11 ENCOUNTER — TELEPHONE (OUTPATIENT)
Dept: INTERNAL MEDICINE | Facility: CLINIC | Age: 79
End: 2024-07-11
Payer: COMMERCIAL

## 2024-07-11 DIAGNOSIS — E11.29 TYPE 2 DIABETES MELLITUS WITH MICROALBUMINURIA, WITHOUT LONG-TERM CURRENT USE OF INSULIN (H): Primary | ICD-10-CM

## 2024-07-11 DIAGNOSIS — R80.9 TYPE 2 DIABETES MELLITUS WITH MICROALBUMINURIA, WITHOUT LONG-TERM CURRENT USE OF INSULIN (H): Primary | ICD-10-CM

## 2024-07-11 NOTE — TELEPHONE ENCOUNTER
Reason for Call: Request for an order :    Order or referral being requested   lab orders    Date needed: before my next appointment  9/16/24    Has the patient been seen by the PCP for this problem? YES    Additional comments   pt has appt 9/16/24     Phone number Patient can be reached at:  Cell number on file:    Telephone Information:   Mobile [phone]    or Other phone number  [phone]    Best Time  anytime    Can we leave a detailed message on this number?  YES    Call taken on 7/11/2024 at 10:50 AM by Charlotte Skinner

## 2024-07-11 NOTE — TELEPHONE ENCOUNTER
MR, please see request, No orders coming due in [facility], A1C not due until mid October-last one 04/18/2024    Marisela Phillips, CMA

## 2024-07-15 ENCOUNTER — OFFICE VISIT (OUTPATIENT)
Dept: INTERNAL MEDICINE | Facility: CLINIC | Age: 79
End: 2024-07-15
Payer: COMMERCIAL

## 2024-07-15 VITALS
TEMPERATURE: 99.4 F | WEIGHT: 104 LBS | RESPIRATION RATE: 16 BRPM | HEIGHT: 61 IN | OXYGEN SATURATION: 96 % | HEART RATE: 77 BPM | BODY MASS INDEX: 19.63 KG/M2 | DIASTOLIC BLOOD PRESSURE: 66 MMHG | SYSTOLIC BLOOD PRESSURE: 144 MMHG

## 2024-07-15 DIAGNOSIS — R05.2 SUBACUTE COUGH: Primary | ICD-10-CM

## 2024-07-15 PROCEDURE — 99213 OFFICE O/P EST LOW 20 MIN: CPT | Performed by: INTERNAL MEDICINE

## 2024-07-15 PROCEDURE — 87635 SARS-COV-2 COVID-19 AMP PRB: CPT | Performed by: INTERNAL MEDICINE

## 2024-07-15 NOTE — PROGRESS NOTES
"  Assessment & Plan     Subacute cough  Give this a bit more time- exam quite normal- could look at rx this like bronchiectasis w/quinolone if sx do not improve in next week or so  - Symptomatic COVID-19 Virus (Coronavirus) by PCR Nose                Subjective   Gregoria Coombs is a 79 year old, presenting for the following health issues:  Cough    HPI       Pt here to follow up on cough.  Was seen at  on 07/05/2024 and given Rx for doxycycline.  Pt reports no improvement after taking all medicaitons.  No SOB.                Objective    BP (!) 144/66   Pulse 77   Temp 99.4  F (37.4  C) (Temporal)   Resp 16   Ht 1.549 m (5' 1\")   Wt 47.2 kg (104 lb)   LMP  (LMP Unknown)   SpO2 96%   BMI 19.65 kg/m    Body mass index is 19.65 kg/m .  Physical Exam   GENERAL: alert and no distress  NECK: no adenopathy, no asymmetry, masses, or scars  RESP: lungs clear to auscultation - no rales, rhonchi or wheezes  MS: no gross musculoskeletal defects noted, no edema            Signed Electronically by: Braxton Boone MD    "

## 2024-07-16 LAB — SARS-COV-2 RNA RESP QL NAA+PROBE: NEGATIVE

## 2024-07-23 ENCOUNTER — ANCILLARY PROCEDURE (OUTPATIENT)
Dept: GENERAL RADIOLOGY | Facility: CLINIC | Age: 79
End: 2024-07-23
Attending: FAMILY MEDICINE
Payer: COMMERCIAL

## 2024-07-23 ENCOUNTER — OFFICE VISIT (OUTPATIENT)
Dept: URGENT CARE | Facility: URGENT CARE | Age: 79
End: 2024-07-23
Payer: COMMERCIAL

## 2024-07-23 VITALS
OXYGEN SATURATION: 97 % | SYSTOLIC BLOOD PRESSURE: 152 MMHG | DIASTOLIC BLOOD PRESSURE: 64 MMHG | TEMPERATURE: 97.2 F | HEART RATE: 76 BPM

## 2024-07-23 DIAGNOSIS — R05.2 SUBACUTE COUGH: Primary | ICD-10-CM

## 2024-07-23 DIAGNOSIS — R19.7 DIARRHEA, UNSPECIFIED TYPE: ICD-10-CM

## 2024-07-23 PROCEDURE — 71046 X-RAY EXAM CHEST 2 VIEWS: CPT | Mod: TC | Performed by: RADIOLOGY

## 2024-07-23 PROCEDURE — 99214 OFFICE O/P EST MOD 30 MIN: CPT | Performed by: FAMILY MEDICINE

## 2024-07-23 RX ORDER — DOXYCYCLINE 100 MG/1
100 TABLET ORAL 2 TIMES DAILY
Qty: 14 TABLET | Refills: 0 | Status: SHIPPED | OUTPATIENT
Start: 2024-07-23 | End: 2024-07-30

## 2024-07-23 NOTE — PROGRESS NOTES
SUBJECTIVE: Gregoria Gan is a 79 year old female presenting with a chief complaint of cough  and diarrhea.  Onset of symptoms was 1 month(s) ago with cough and days for diarrhea.    Past Medical History:   Diagnosis Date    CKD (chronic kidney disease) stage 3, GFR 30-59 ml/min (H)     HTN (hypertension), benign     Other and unspecified hyperlipidemia     Type 2 diabetes, HbA1c goal < 7% (H)     Unspecified hypothyroidism      No Known Allergies  Social History     Tobacco Use    Smoking status: Never    Smokeless tobacco: Never   Substance Use Topics    Alcohol use: No     Alcohol/week: 0.0 standard drinks of alcohol       ROS:  SKIN: no rash  GI: no vomiting    OBJECTIVE:  BP (!) 152/64   Pulse 76   Temp 97.2  F (36.2  C)   LMP  (LMP Unknown)   SpO2 97% GENERAL APPEARANCE: healthy, alert and no distress  EYES: EOMI,  PERRL, conjunctiva clear  HENT: ear canals and TM's normal.  Nose and mouth without ulcers, erythema or lesions  RESP: lungs clear to auscultation - no rales, rhonchi or wheezes  ABDOMEN:  soft, nontender, no HSM or masses and bowel sounds normal  SKIN: no suspicious lesions or rashes      ICD-10-CM    1. Subacute cough  R05.2 XR Chest 2 Views     doxycycline monohydrate (ADOXA) 100 MG tablet      2. Diarrhea, unspecified type  R19.7 Enteric Bacteria and Virus Panel by [facility] Stool     Enteric Bacteria and Virus Panel by [facility] Stool          Fluids/Rest, f/u if worse/not any better

## 2024-09-01 ENCOUNTER — TRANSFERRED RECORDS (OUTPATIENT)
Dept: MULTI SPECIALTY CLINIC | Facility: CLINIC | Age: 79
End: 2024-09-01
Payer: COMMERCIAL

## 2024-09-01 LAB — RETINOPATHY: NORMAL

## 2024-09-06 ENCOUNTER — TRANSFERRED RECORDS (OUTPATIENT)
Dept: HEALTH INFORMATION MANAGEMENT | Facility: CLINIC | Age: 79
End: 2024-09-06
Payer: COMMERCIAL

## 2024-09-06 LAB — RETINOPATHY: NEGATIVE

## 2024-09-08 DIAGNOSIS — I10 HTN (HYPERTENSION), BENIGN: ICD-10-CM

## 2024-09-09 ENCOUNTER — LAB (OUTPATIENT)
Dept: LAB | Facility: CLINIC | Age: 79
End: 2024-09-09
Payer: COMMERCIAL

## 2024-09-09 DIAGNOSIS — E11.29 TYPE 2 DIABETES MELLITUS WITH MICROALBUMINURIA, WITHOUT LONG-TERM CURRENT USE OF INSULIN (H): ICD-10-CM

## 2024-09-09 DIAGNOSIS — R80.9 TYPE 2 DIABETES MELLITUS WITH MICROALBUMINURIA, WITHOUT LONG-TERM CURRENT USE OF INSULIN (H): ICD-10-CM

## 2024-09-09 LAB
CREAT UR-MCNC: 63.8 MG/DL
HBA1C MFR BLD: 7.5 % (ref 0–5.6)
MICROALBUMIN UR-MCNC: 36.2 MG/L
MICROALBUMIN/CREAT UR: 56.74 MG/G CR (ref 0–25)

## 2024-09-09 PROCEDURE — 36415 COLL VENOUS BLD VENIPUNCTURE: CPT

## 2024-09-09 PROCEDURE — 82043 UR ALBUMIN QUANTITATIVE: CPT

## 2024-09-09 PROCEDURE — 83036 HEMOGLOBIN GLYCOSYLATED A1C: CPT

## 2024-09-09 PROCEDURE — 82570 ASSAY OF URINE CREATININE: CPT

## 2024-09-09 RX ORDER — AMLODIPINE BESYLATE 10 MG/1
10 TABLET ORAL DAILY
Qty: 90 TABLET | Refills: 0 | Status: SHIPPED | OUTPATIENT
Start: 2024-09-09 | End: 2024-10-03

## 2024-09-16 ENCOUNTER — OFFICE VISIT (OUTPATIENT)
Dept: INTERNAL MEDICINE | Facility: CLINIC | Age: 79
End: 2024-09-16
Payer: COMMERCIAL

## 2024-09-16 VITALS
SYSTOLIC BLOOD PRESSURE: 135 MMHG | TEMPERATURE: 98.8 F | HEIGHT: 61 IN | RESPIRATION RATE: 15 BRPM | HEART RATE: 65 BPM | OXYGEN SATURATION: 98 % | DIASTOLIC BLOOD PRESSURE: 70 MMHG | WEIGHT: 105.8 LBS | BODY MASS INDEX: 19.98 KG/M2

## 2024-09-16 DIAGNOSIS — Z79.4 TYPE 2 DIABETES MELLITUS WITH DIABETIC MICROALBUMINURIA, WITH LONG-TERM CURRENT USE OF INSULIN (H): Primary | ICD-10-CM

## 2024-09-16 DIAGNOSIS — F03.B0 MODERATE DEMENTIA WITHOUT BEHAVIORAL DISTURBANCE, PSYCHOTIC DISTURBANCE, MOOD DISTURBANCE, OR ANXIETY, UNSPECIFIED DEMENTIA TYPE (H): ICD-10-CM

## 2024-09-16 DIAGNOSIS — R80.9 TYPE 2 DIABETES MELLITUS WITH DIABETIC MICROALBUMINURIA, WITH LONG-TERM CURRENT USE OF INSULIN (H): Primary | ICD-10-CM

## 2024-09-16 DIAGNOSIS — I10 HTN (HYPERTENSION), BENIGN: ICD-10-CM

## 2024-09-16 DIAGNOSIS — E11.29 TYPE 2 DIABETES MELLITUS WITH DIABETIC MICROALBUMINURIA, WITH LONG-TERM CURRENT USE OF INSULIN (H): Primary | ICD-10-CM

## 2024-09-16 PROCEDURE — 99214 OFFICE O/P EST MOD 30 MIN: CPT | Performed by: INTERNAL MEDICINE

## 2024-09-16 PROCEDURE — G2211 COMPLEX E/M VISIT ADD ON: HCPCS | Performed by: INTERNAL MEDICINE

## 2024-09-16 RX ORDER — INSULIN GLARGINE 100 [IU]/ML
18 INJECTION, SOLUTION SUBCUTANEOUS EVERY MORNING
Status: SHIPPED
Start: 2024-09-16 | End: 2024-10-03

## 2024-09-16 RX ORDER — AMLODIPINE BESYLATE 10 MG/1
10 TABLET ORAL DAILY
Qty: 90 TABLET | Refills: 3 | Status: CANCELLED | OUTPATIENT
Start: 2024-09-16

## 2024-09-16 RX ORDER — INSULIN GLARGINE 100 [IU]/ML
15 INJECTION, SOLUTION SUBCUTANEOUS EVERY MORNING
Qty: 15 ML | Refills: 3 | Status: CANCELLED | OUTPATIENT
Start: 2024-09-16

## 2024-09-16 RX ORDER — GLIPIZIDE 5 MG/1
5 TABLET ORAL
Qty: 90 TABLET | Refills: 3 | Status: CANCELLED | OUTPATIENT
Start: 2024-09-16

## 2024-09-16 NOTE — PROGRESS NOTES
Assessment & Plan     Type 2 diabetes mellitus with diabetic microalbuminuria, with long-term current use of insulin (H)  dementia  PM post meal hyperglycemia + early am hypoglycemia.  Look at switching lantus to nph/regular  or reducing dose a bit  -w/current lantus reduce dose to 16 units as we add afternoon glipizide to help with afternoon-evening highs  - insulin glargine (LANTUS SOLOSTAR) 100 UNIT/ML pen; Inject 18 Units subcutaneously every morning.      Blood sugar testing frequency justification:   insulin Uncontrolled diabetes, Adjustment of medication(s), and Risk of hypoglycemia with medication(s)    The longitudinal plan of care for the diagnosis(es)/condition(s) as documented were addressed during this visit. Due to the added complexity in care, I will continue to support maria teresa coombs in the subsequent management and with ongoing continuity of care.        Subjective   Maria Teresa Coombs is a 79 year old, presenting for the following health issues:  Diabetes      Via the Health Maintenance questionnaire, the patient has reported the following services have been completed -Eye Exam: [facility] 2024-09-01, this information has been sent to the abstraction team.  History of Present Illness       Diabetes:   She presents for follow up of diabetes.  She is checking home blood glucose four or more times daily.   She checks blood glucose before and after meals and at bedtime.  Blood glucose is sometimes over 200 and never under 70.  When her blood glucose is low, the patient is asymptomatic for confusion, blurred vision, lethargy and reports not feeling dizzy, shaky, or weak.   She has no concerns regarding her diabetes at this time.   She is not experiencing numbness or burning in feet, excessive thirst, blurry vision, weight changes or redness, sores or blisters on feet. The patient has had a diabetic eye exam in the last 12 months. Eye exam performed on 09/01/2024. Location of last eye exam [facility].         "                    Objective    /70   Pulse 65   Temp 98.8  F (37.1  C) (Temporal)   Resp 15   Ht 1.549 m (5' 1\")   Wt 48 kg (105 lb 12.8 oz)   LMP  (LMP Unknown)   SpO2 98%   BMI 19.99 kg/m    Body mass index is 19.99 kg/m .  Physical Exam   GENERAL: alert and no distress            Signed Electronically by: Braxton Boone MD    "

## 2024-09-17 DIAGNOSIS — I10 HTN (HYPERTENSION), BENIGN: ICD-10-CM

## 2024-09-17 RX ORDER — AMLODIPINE BESYLATE 10 MG/1
10 TABLET ORAL DAILY
Qty: 90 TABLET | Refills: 0 | OUTPATIENT
Start: 2024-09-17

## 2024-09-19 DIAGNOSIS — I10 HTN (HYPERTENSION), BENIGN: ICD-10-CM

## 2024-09-19 RX ORDER — AMLODIPINE BESYLATE 10 MG/1
10 TABLET ORAL DAILY
Qty: 90 TABLET | Refills: 0 | OUTPATIENT
Start: 2024-09-19

## 2024-09-20 DIAGNOSIS — I10 HTN (HYPERTENSION), BENIGN: ICD-10-CM

## 2024-09-20 RX ORDER — AMLODIPINE BESYLATE 10 MG/1
10 TABLET ORAL DAILY
Qty: 90 TABLET | Refills: 0 | OUTPATIENT
Start: 2024-09-20

## 2024-10-02 DIAGNOSIS — F03.B0 MODERATE DEMENTIA WITHOUT BEHAVIORAL DISTURBANCE, PSYCHOTIC DISTURBANCE, MOOD DISTURBANCE, OR ANXIETY, UNSPECIFIED DEMENTIA TYPE (H): ICD-10-CM

## 2024-10-02 RX ORDER — MEMANTINE HYDROCHLORIDE 5 MG/1
10 TABLET ORAL DAILY
Qty: 180 TABLET | Refills: 2 | Status: SHIPPED | OUTPATIENT
Start: 2024-10-02

## 2024-10-03 ENCOUNTER — VIRTUAL VISIT (OUTPATIENT)
Dept: INTERNAL MEDICINE | Facility: CLINIC | Age: 79
End: 2024-10-03
Payer: COMMERCIAL

## 2024-10-03 DIAGNOSIS — E11.29 TYPE 2 DIABETES MELLITUS WITH DIABETIC MICROALBUMINURIA, WITH LONG-TERM CURRENT USE OF INSULIN (H): ICD-10-CM

## 2024-10-03 DIAGNOSIS — R80.9 TYPE 2 DIABETES MELLITUS WITH DIABETIC MICROALBUMINURIA, WITH LONG-TERM CURRENT USE OF INSULIN (H): ICD-10-CM

## 2024-10-03 DIAGNOSIS — Z79.4 TYPE 2 DIABETES MELLITUS WITH DIABETIC MICROALBUMINURIA, WITH LONG-TERM CURRENT USE OF INSULIN (H): ICD-10-CM

## 2024-10-03 DIAGNOSIS — I10 HTN (HYPERTENSION), BENIGN: ICD-10-CM

## 2024-10-03 PROCEDURE — G2211 COMPLEX E/M VISIT ADD ON: HCPCS | Mod: 95 | Performed by: INTERNAL MEDICINE

## 2024-10-03 PROCEDURE — 99214 OFFICE O/P EST MOD 30 MIN: CPT | Mod: GT | Performed by: INTERNAL MEDICINE

## 2024-10-03 RX ORDER — INSULIN GLARGINE 100 [IU]/ML
16 INJECTION, SOLUTION SUBCUTANEOUS EVERY MORNING
Status: SHIPPED
Start: 2024-10-03 | End: 2024-11-01

## 2024-10-03 RX ORDER — AMLODIPINE BESYLATE 10 MG/1
10 TABLET ORAL DAILY
Qty: 90 TABLET | Refills: 3 | Status: SHIPPED | OUTPATIENT
Start: 2024-10-03

## 2024-10-03 RX ORDER — GLIPIZIDE 5 MG/1
5 TABLET ORAL
Qty: 90 TABLET | Refills: 1 | Status: SHIPPED | OUTPATIENT
Start: 2024-10-03

## 2024-10-03 NOTE — PROGRESS NOTES
maria teresa vera is a 79 year old who is being evaluated via a billable video visit.    How would you like to obtain your AVS? MyChart  If the video visit is dropped, the invitation should be resent by: Text to cell phone: [phone]  Will anyone else be joining your video visit? No      Assessment & Plan     Type 2 diabetes mellitus with diabetic microalbuminuria, with long-term current use of insulin (H)  Add glipizide at noon - see if this helps afternoon/evening highs from meals.  Reduce lantus just a bit to avoid am hypoglycemia . Can readjust later.   - glipiZIDE (GLUCOTROL) 5 MG tablet; Take 1 tablet (5 mg) by mouth daily (before lunch).  - insulin glargine (LANTUS SOLOSTAR) 100 UNIT/ML pen; Inject 16 Units subcutaneously every morning.    HTN (hypertension), benign  Refilled. Med list reviewed and accurate  - amLODIPine (NORVASC) 10 MG tablet; Take 1 tablet (10 mg) by mouth daily.    The longitudinal plan of care for the diagnosis(es)/condition(s) as documented were addressed during this visit. Due to the added complexity in care, I will continue to support maria teresa vera in the subsequent management and with ongoing continuity of care.    Regular exercise  See Patient Instructions    Subjective   maria teresa vera is a 79 year old, presenting for the following health issues:  Recheck Medication      Video Start Time:  1129    HPI     Hypertension Follow-up    Do you check your blood pressure regularly outside of the clinic? Yes   Are you following a low salt diet? No  Are your blood pressures ever more than 140 on the top number (systolic) OR more   than 90 on the bottom number (diastolic), for example 140/90? No  How many servings of fruits and vegetables do you eat daily?  0-1  On average, how many sweetened beverages do you drink each day (Examples: soda, juice, sweet tea, etc.  Do NOT count diet or artificially sweetened beverages)?   0  How many days per week do you exercise enough to make your heart beat faster? 3 or  less  How many minutes a day do you exercise enough to make your heart beat faster? 9 or less  How many days per week do you miss taking your medication? 0            Objective           Vitals:  No vitals were obtained today due to virtual visit.    Physical Exam   GENERAL:  elderly, fatigued, and sleeping in bed          Video-Visit Details  Video with  as primary historian   Type of service:  Video Visit   Video End Time:11:50 AM  Originating Location (pt. Location): Home    Distant Location (provider location):  On-site  Platform used for Video Visit: Shilpa  Signed Electronically by: Braxton Boone MD

## 2024-11-01 ENCOUNTER — MYC MEDICAL ADVICE (OUTPATIENT)
Dept: INTERNAL MEDICINE | Facility: CLINIC | Age: 79
End: 2024-11-01
Payer: COMMERCIAL

## 2024-11-01 DIAGNOSIS — E11.29 TYPE 2 DIABETES MELLITUS WITH DIABETIC MICROALBUMINURIA, WITH LONG-TERM CURRENT USE OF INSULIN (H): ICD-10-CM

## 2024-11-01 DIAGNOSIS — R80.9 TYPE 2 DIABETES MELLITUS WITH DIABETIC MICROALBUMINURIA, WITH LONG-TERM CURRENT USE OF INSULIN (H): ICD-10-CM

## 2024-11-01 DIAGNOSIS — Z79.4 TYPE 2 DIABETES MELLITUS WITH DIABETIC MICROALBUMINURIA, WITH LONG-TERM CURRENT USE OF INSULIN (H): ICD-10-CM

## 2024-11-01 RX ORDER — INSULIN GLARGINE 100 [IU]/ML
16 INJECTION, SOLUTION SUBCUTANEOUS EVERY MORNING
Qty: 15 ML | Refills: 1 | Status: SHIPPED | OUTPATIENT
Start: 2024-11-01

## 2024-11-01 NOTE — TELEPHONE ENCOUNTER
Lab Results   Component Value Date    A1C 7.5 09/09/2024    A1C 9.6 04/18/2024    A1C 8.3 09/28/2023    A1C 7.5 04/21/2023    A1C 8.4 10/28/2022    A1C 8.5 05/28/2021    A1C 8.3 02/24/2021    A1C 8.8 10/16/2020    A1C 8.7 10/08/2020    A1C 8.5 07/10/2020

## 2024-11-21 ENCOUNTER — OFFICE VISIT (OUTPATIENT)
Dept: URGENT CARE | Facility: URGENT CARE | Age: 79
End: 2024-11-21
Payer: COMMERCIAL

## 2024-11-21 VITALS
BODY MASS INDEX: 19.84 KG/M2 | SYSTOLIC BLOOD PRESSURE: 128 MMHG | TEMPERATURE: 97.7 F | DIASTOLIC BLOOD PRESSURE: 66 MMHG | WEIGHT: 105 LBS | HEART RATE: 70 BPM | OXYGEN SATURATION: 95 % | RESPIRATION RATE: 14 BRPM

## 2024-11-21 DIAGNOSIS — R91.8 PULMONARY NODULES: ICD-10-CM

## 2024-11-21 DIAGNOSIS — K59.01 SLOW TRANSIT CONSTIPATION: ICD-10-CM

## 2024-11-21 DIAGNOSIS — R05.3 PERSISTENT COUGH FOR 3 WEEKS OR LONGER: Primary | ICD-10-CM

## 2024-11-21 DIAGNOSIS — N18.32 STAGE 3B CHRONIC KIDNEY DISEASE (H): ICD-10-CM

## 2024-11-21 LAB
ANION GAP SERPL CALCULATED.3IONS-SCNC: 6 MMOL/L (ref 3–14)
BASOPHILS # BLD AUTO: 0.1 10E3/UL (ref 0–0.2)
BASOPHILS NFR BLD AUTO: 1 %
BUN SERPL-MCNC: 18 MG/DL (ref 7–30)
CALCIUM SERPL-MCNC: 12.2 MG/DL (ref 8.5–10.1)
CHLORIDE BLD-SCNC: 107 MMOL/L (ref 94–109)
CO2 SERPL-SCNC: 30 MMOL/L (ref 20–32)
CREAT SERPL-MCNC: 1.3 MG/DL (ref 0.52–1.04)
EGFRCR SERPLBLD CKD-EPI 2021: 41 ML/MIN/1.73M2
EOSINOPHIL # BLD AUTO: 0.2 10E3/UL (ref 0–0.7)
EOSINOPHIL NFR BLD AUTO: 3 %
ERYTHROCYTE [DISTWIDTH] IN BLOOD BY AUTOMATED COUNT: 12.7 % (ref 10–15)
GLUCOSE BLD-MCNC: 158 MG/DL (ref 70–99)
HCT VFR BLD AUTO: 38.7 % (ref 35–47)
HGB BLD-MCNC: 13.4 G/DL (ref 11.7–15.7)
IMM GRANULOCYTES # BLD: 0 10E3/UL
IMM GRANULOCYTES NFR BLD: 0 %
LYMPHOCYTES # BLD AUTO: 3.1 10E3/UL (ref 0.8–5.3)
LYMPHOCYTES NFR BLD AUTO: 39 %
MCH RBC QN AUTO: 30.7 PG (ref 26.5–33)
MCHC RBC AUTO-ENTMCNC: 34.6 G/DL (ref 31.5–36.5)
MCV RBC AUTO: 89 FL (ref 78–100)
MONOCYTES # BLD AUTO: 0.8 10E3/UL (ref 0–1.3)
MONOCYTES NFR BLD AUTO: 10 %
NEUTROPHILS # BLD AUTO: 3.7 10E3/UL (ref 1.6–8.3)
NEUTROPHILS NFR BLD AUTO: 47 %
PLATELET # BLD AUTO: 252 10E3/UL (ref 150–450)
POTASSIUM BLD-SCNC: 4.6 MMOL/L (ref 3.4–5.3)
RBC # BLD AUTO: 4.37 10E6/UL (ref 3.8–5.2)
SODIUM SERPL-SCNC: 143 MMOL/L (ref 135–145)
WBC # BLD AUTO: 7.9 10E3/UL (ref 4–11)

## 2024-11-21 RX ORDER — MULTIVITAMIN WITH IRON
1 TABLET ORAL DAILY
COMMUNITY

## 2024-11-21 RX ORDER — TRAZODONE HYDROCHLORIDE 100 MG/1
100 TABLET ORAL
COMMUNITY

## 2024-11-21 NOTE — PATIENT INSTRUCTIONS
Increase water intake to 2 liters of water a day. Continue Metamucil and Citrucel.    CT SCAN     3:05 PM   [facility]  [address]   [place]

## 2024-11-21 NOTE — PROGRESS NOTES
Chief Complaint   Patient presents with    Urgent Care     Return for cough which has persisted for 8 months, seen in urgent care by Dr. Galan twice in July.  Meds given did not help or get rid of the cough.         ICD-10-CM    1. Persistent cough for 3 weeks or longer  R05.3 CBC with platelets and differential     CBC with platelets and differential      2. Pulmonary nodules  R91.8 CT Chest w Contrast      3. Stage 3b chronic kidney disease (H)  N18.32 Basic metabolic panel  (Ca, Cl, CO2, Creat, Gluc, K, Na, BUN)      4. Slow transit constipation  K59.01       Patient's kidney disease is stable.  Low-dose contrast only used for CT scan.  CT is unchanged from scans in April of this year.  No clear cause of her persistent cough.  She reports she has seen an allergist and they did not think it was related to an allergy.  Instructed her to follow-up with primary care and they may consider referring her to pulmonology.      Red flag warning signs and when to go to the emergency room discussed.  Reviewed potential adverse reactions to medications.    42 minutes spent by me on the date of the encounter doing chart review, history and exam, documentation and further activities per the note    Medical Decision Making    Differential diagnosis includes but is not limited to: Cough, URI, pneumonia, pharyngitis, foreign body aspiration, asthma, allergic symptoms, TB, pertussis, pulmonary embolus, abscess, epiglottitis, supraglottitis, CHF, influenza, COVID-19.    Results for orders placed or performed in visit on 11/21/24 (from the past 24 hours)   Basic metabolic panel  (Ca, Cl, CO2, Creat, Gluc, K, Na, BUN)   Result Value Ref Range    Sodium 143 135 - 145 mmol/L    Potassium 4.6 3.4 - 5.3 mmol/L    Chloride 107 94 - 109 mmol/L    Carbon Dioxide (CO2) 30 20 - 32 mmol/L    Anion Gap 6 3 - 14 mmol/L    Urea Nitrogen 18 7 - 30 mg/dL    Creatinine 1.30 (H) 0.52 - 1.04 mg/dL    GFR Estimate 41 (L) >60 mL/min/1.73m2    Calcium  12.2 (H) 8.5 - 10.1 mg/dL    Glucose 158 (H) 70 - 99 mg/dL   CBC with platelets and differential    Narrative    The following orders were created for panel order CBC with platelets and differential.  Procedure                               Abnormality         Status                     ---------                               -----------         ------                     CBC with platelets and d...[[id number]]                      Final result                 Please view results for these tests on the individual orders.   CBC with platelets and differential   Result Value Ref Range    WBC Count 7.9 4.0 - 11.0 10e3/uL    RBC Count 4.37 3.80 - 5.20 10e6/uL    Hemoglobin 13.4 11.7 - 15.7 g/dL    Hematocrit 38.7 35.0 - 47.0 %    MCV 89 78 - 100 fL    MCH 30.7 26.5 - 33.0 pg    MCHC 34.6 31.5 - 36.5 g/dL    RDW 12.7 10.0 - 15.0 %    Platelet Count 252 150 - 450 10e3/uL    % Neutrophils 47 %    % Lymphocytes 39 %    % Monocytes 10 %    % Eosinophils 3 %    % Basophils 1 %    % Immature Granulocytes 0 %    Absolute Neutrophils 3.7 1.6 - 8.3 10e3/uL    Absolute Lymphocytes 3.1 0.8 - 5.3 10e3/uL    Absolute Monocytes 0.8 0.0 - 1.3 10e3/uL    Absolute Eosinophils 0.2 0.0 - 0.7 10e3/uL    Absolute Basophils 0.1 0.0 - 0.2 10e3/uL    Absolute Immature Granulocytes 0.0 <=0.4 10e3/uL   CT Chest w Contrast    Narrative    CT CHEST W CONTRAST 11/21/2024 3:14 PM    CLINICAL HISTORY: Pulmonary nodule follow up with persistent cough,  low dose contrast please; Pulmonary nodules  TECHNIQUE: CT chest with IV contrast. Multiplanar reformats were  obtained. Dose reduction techniques were used.    CONTRAST: 52ml isovue 370    COMPARISON: 4/10/2024 CT chest    FINDINGS:   LUNGS AND PLEURA: No pleural effusion or pneumothorax is present.  Stable basilar interstitial thickening compatible with fibrotic  changes. Mild bronchiectasis with lower lobe predominance also appears  unchanged. No honeycombing is evident. Stable appearance of  pulmonary  nodules measuring up to 1.5 cm in the right lower lobe (series 3,  image 45). 5 mm pulmonary nodule in the left lower lobe is also  unchanged (series 3, image 216).    MEDIASTINUM/AXILLAE: No lymphadenopathy. No thoracic aneurysm.  Moderate vascular calcifications are seen within the thoracic aorta.  Mild coronary artery calcifications.    UPPER ABDOMEN: No significant finding.    MUSCULOSKELETAL: Unremarkable.      Impression    IMPRESSION:   1.  Stable appearance of pulmonary nodules measuring up to 1.5 cm in  the right lower lobe.  2.  Stable appearance of basilar predominant fibrosis.    IHSAN RAO MD         SYSTEM ID:  [id number]       Subjective     Greogria Gan is an 79 year old female who presents to clinic today for persistent cough since the beginning of the year.  Patient has been seen multiple times for this first in January of this year when she underwent a CT scan of her chest that showed nodule in the right middle lobe and right lung base.  She was advised at that time to follow-up with repeat CT in 3 months.  This also showed mild pulmonary fibrosis.  In April of this year she further underwent another chest CT which showed a stable nodule and recommended repeat CT in October of this year.  This has not been completed.    ROS: 10 point ROS neg other than the symptoms noted above in the HPI.       Objective    /66   Pulse 70   Temp 97.7  F (36.5  C) (Tympanic)   Resp 14   Wt 47.6 kg (105 lb)   LMP  (LMP Unknown)   SpO2 95%   BMI 19.84 kg/m    Nurses notes and VS have been reviewed.    Physical Exam       GENERAL APPEARANCE: healthy appearing, alert     EYES: PERRL, EOMI, sclera non-icteric     HENT: oral exam benign, mucus membranes intact, without ulcers or lesions     NECK: no adenopathy or asymmetry, thyroid normal to palpation     RESP: Buice basilar crackles otherwise clear, no increased work of breathing noted     CV: regular rates and rhythm, soft systolic  murmur     MS: extremities normal- no gross deformities noted; normal muscle tone.     SKIN: no suspicious lesions or rashes     NEURO: Normal strength and tone, mentation intact and speech normal      FLORA Dalton, CNP  [facility] Provider    The use of Dragon/Eyelation dictation services may have been used to construct the content in this note; any grammatical or spelling errors are non-intentional. Please contact the author of this note directly if you are in need of any clarification.

## 2024-12-26 ENCOUNTER — APPOINTMENT (OUTPATIENT)
Dept: CT IMAGING | Facility: CLINIC | Age: 79
End: 2024-12-26
Attending: EMERGENCY MEDICINE
Payer: COMMERCIAL

## 2024-12-26 ENCOUNTER — HOSPITAL ENCOUNTER (EMERGENCY)
Facility: CLINIC | Age: 79
Discharge: HOME | End: 2024-12-26
Attending: EMERGENCY MEDICINE
Payer: COMMERCIAL

## 2024-12-26 ENCOUNTER — VIRTUAL VISIT (OUTPATIENT)
Dept: INTERPRETER SERVICES | Facility: CLINIC | Age: 79
End: 2024-12-26

## 2024-12-26 ENCOUNTER — APPOINTMENT (OUTPATIENT)
Dept: GENERAL RADIOLOGY | Facility: CLINIC | Age: 79
End: 2024-12-26
Attending: EMERGENCY MEDICINE
Payer: COMMERCIAL

## 2024-12-26 VITALS
DIASTOLIC BLOOD PRESSURE: 64 MMHG | TEMPERATURE: 97.5 F | HEART RATE: 62 BPM | SYSTOLIC BLOOD PRESSURE: 151 MMHG | RESPIRATION RATE: 15 BRPM | BODY MASS INDEX: 19.31 KG/M2 | WEIGHT: 104.94 LBS | OXYGEN SATURATION: 99 % | HEIGHT: 62 IN

## 2024-12-26 DIAGNOSIS — R53.1 GENERALIZED WEAKNESS: ICD-10-CM
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
## 2024-12-26 DIAGNOSIS — R94.31 NONSPECIFIC ABNORMAL ELECTROCARDIOGRAM (ECG) (EKG): ICD-10-CM

## 2024-12-26 LAB
ALBUMIN SERPL BCG-MCNC: 4.1 G/DL (ref 3.5–5.2)
ALBUMIN UR-MCNC: NEGATIVE MG/DL
ALP SERPL-CCNC: 98 U/L (ref 40–150)
ALT SERPL W P-5'-P-CCNC: 31 U/L (ref 0–50)
ANION GAP SERPL CALCULATED.3IONS-SCNC: 13 MMOL/L (ref 7–15)
APPEARANCE UR: CLEAR
AST SERPL W P-5'-P-CCNC: 23 U/L (ref 0–45)
ATRIAL RATE - MUSE: 57 BPM
ATRIAL RATE - MUSE: 60 BPM
BASOPHILS # BLD AUTO: 0.1 10E3/UL (ref 0–0.2)
BASOPHILS NFR BLD AUTO: 1 %
BILIRUB SERPL-MCNC: 0.7 MG/DL
BILIRUB UR QL STRIP: NEGATIVE
BUN SERPL-MCNC: 26.8 MG/DL (ref 8–23)
CALCIUM SERPL-MCNC: 11.3 MG/DL (ref 8.8–10.4)
CHLORIDE SERPL-SCNC: 104 MMOL/L (ref 98–107)
COLOR UR AUTO: ABNORMAL
CREAT SERPL-MCNC: 1.36 MG/DL (ref 0.51–0.95)
DIASTOLIC BLOOD PRESSURE - MUSE: NORMAL MMHG
DIASTOLIC BLOOD PRESSURE - MUSE: NORMAL MMHG
EGFRCR SERPLBLD CKD-EPI 2021: 39 ML/MIN/1.73M2
EOSINOPHIL # BLD AUTO: 0.2 10E3/UL (ref 0–0.7)
EOSINOPHIL NFR BLD AUTO: 3 %
ERYTHROCYTE [DISTWIDTH] IN BLOOD BY AUTOMATED COUNT: 12.6 % (ref 10–15)
FLUAV RNA SPEC QL NAA+PROBE: NEGATIVE
FLUBV RNA RESP QL NAA+PROBE: NEGATIVE
GLUCOSE SERPL-MCNC: 151 MG/DL (ref 70–99)
GLUCOSE UR STRIP-MCNC: NEGATIVE MG/DL
HCO3 SERPL-SCNC: 22 MMOL/L (ref 22–29)
HCT VFR BLD AUTO: 38.2 % (ref 35–47)
HGB BLD-MCNC: 13.2 G/DL (ref 11.7–15.7)
HGB UR QL STRIP: NEGATIVE
HOLD SPECIMEN: 0
HOLD SPECIMEN: NORMAL
HOLD SPECIMEN: NORMAL
IMM GRANULOCYTES # BLD: 0 10E3/UL
IMM GRANULOCYTES NFR BLD: 0 %
INTERPRETATION ECG - MUSE: NORMAL
INTERPRETATION ECG - MUSE: NORMAL
KETONES UR STRIP-MCNC: NEGATIVE MG/DL
LEUKOCYTE ESTERASE UR QL STRIP: NEGATIVE
LYMPHOCYTES # BLD AUTO: 2.6 10E3/UL (ref 0.8–5.3)
LYMPHOCYTES NFR BLD AUTO: 37 %
MCH RBC QN AUTO: 30.3 PG (ref 26.5–33)
MCHC RBC AUTO-ENTMCNC: 34.6 G/DL (ref 31.5–36.5)
MCV RBC AUTO: 88 FL (ref 78–100)
MONOCYTES # BLD AUTO: 0.5 10E3/UL (ref 0–1.3)
MONOCYTES NFR BLD AUTO: 8 %
MUCOUS THREADS #/AREA URNS LPF: PRESENT /LPF
NEUTROPHILS # BLD AUTO: 3.7 10E3/UL (ref 1.6–8.3)
NEUTROPHILS NFR BLD AUTO: 52 %
NITRATE UR QL: NEGATIVE
NRBC # BLD AUTO: 0 10E3/UL
NRBC BLD AUTO-RTO: 0 /100
P AXIS - MUSE: 42 DEGREES
P AXIS - MUSE: 76 DEGREES
PH UR STRIP: 6.5 [PH] (ref 5–7)
PLATELET # BLD AUTO: 240 10E3/UL (ref 150–450)
POTASSIUM SERPL-SCNC: 4.1 MMOL/L (ref 3.4–5.3)
PR INTERVAL - MUSE: 212 MS
PR INTERVAL - MUSE: 236 MS
PROT SERPL-MCNC: 7.9 G/DL (ref 6.4–8.3)
QRS DURATION - MUSE: 92 MS
QRS DURATION - MUSE: 94 MS
QT - MUSE: 428 MS
QT - MUSE: 436 MS
QTC - MUSE: 416 MS
QTC - MUSE: 436 MS
R AXIS - MUSE: 73 DEGREES
R AXIS - MUSE: 73 DEGREES
RBC # BLD AUTO: 4.36 10E6/UL (ref 3.8–5.2)
RBC URINE: <1 /HPF
RSV RNA SPEC NAA+PROBE: NEGATIVE
SARS-COV-2 RNA RESP QL NAA+PROBE: NEGATIVE
SODIUM SERPL-SCNC: 139 MMOL/L (ref 135–145)
SP GR UR STRIP: 1.01 (ref 1–1.03)
SYSTOLIC BLOOD PRESSURE - MUSE: NORMAL MMHG
SYSTOLIC BLOOD PRESSURE - MUSE: NORMAL MMHG
T AXIS - MUSE: 205 DEGREES
T AXIS - MUSE: 224 DEGREES
TROPONIN T SERPL HS-MCNC: 14 NG/L
TROPONIN T SERPL HS-MCNC: 16 NG/L
UROBILINOGEN UR STRIP-MCNC: NORMAL MG/DL
VENTRICULAR RATE- MUSE: 57 BPM
VENTRICULAR RATE- MUSE: 60 BPM
WBC # BLD AUTO: 7.1 10E3/UL (ref 4–11)
WBC URINE: 1 /HPF

## 2024-12-26 PROCEDURE — T1013 SIGN LANG/ORAL INTERPRETER: HCPCS | Mod: GT,TEL,95

## 2024-12-26 PROCEDURE — 82565 ASSAY OF CREATININE: CPT | Performed by: EMERGENCY MEDICINE

## 2024-12-26 PROCEDURE — 81001 URINALYSIS AUTO W/SCOPE: CPT | Performed by: EMERGENCY MEDICINE

## 2024-12-26 PROCEDURE — 85004 AUTOMATED DIFF WBC COUNT: CPT | Performed by: EMERGENCY MEDICINE

## 2024-12-26 PROCEDURE — 36415 COLL VENOUS BLD VENIPUNCTURE: CPT | Performed by: EMERGENCY MEDICINE

## 2024-12-26 PROCEDURE — 84155 ASSAY OF PROTEIN SERUM: CPT | Performed by: EMERGENCY MEDICINE

## 2024-12-26 PROCEDURE — 70450 CT HEAD/BRAIN W/O DYE: CPT

## 2024-12-26 PROCEDURE — 71046 X-RAY EXAM CHEST 2 VIEWS: CPT

## 2024-12-26 PROCEDURE — 87637 SARSCOV2&INF A&B&RSV AMP PRB: CPT | Performed by: EMERGENCY MEDICINE

## 2024-12-26 PROCEDURE — 84484 ASSAY OF TROPONIN QUANT: CPT | Performed by: EMERGENCY MEDICINE

## 2024-12-26 ASSESSMENT — COLUMBIA-SUICIDE SEVERITY RATING SCALE - C-SSRS
6. HAVE YOU EVER DONE ANYTHING, STARTED TO DO ANYTHING, OR PREPARED TO DO ANYTHING TO END YOUR LIFE?: NO
2. HAVE YOU ACTUALLY HAD ANY THOUGHTS OF KILLING YOURSELF IN THE PAST MONTH?: NO
1. IN THE PAST MONTH, HAVE YOU WISHED YOU WERE DEAD OR WISHED YOU COULD GO TO SLEEP AND NOT WAKE UP?: NO

## 2024-12-26 ASSESSMENT — ACTIVITIES OF DAILY LIVING (ADL)
ADLS_ACUITY_SCORE: 41

## 2024-12-26 NOTE — ED NOTES
Bed: ED02  Expected date:   Expected time:   Means of arrival:   Comments:  Teo [age]f fall eta 8

## 2024-12-26 NOTE — DISCHARGE INSTRUCTIONS
Please follow-up with your primary care provider in the next week for reevaluation.    Please return to the emergency department as needed for new or worsening symptoms including chest pain, shortness of breath, fainting, fever greater than 104  F, vomiting unable to keep anything down, any other concerning symptoms.

## 2024-12-26 NOTE — ED PROVIDER NOTES
"  Emergency Department Note      History of Present Illness     Chief Complaint   Fall and Generalized Weakness    HPI   Gregoria Gan is a 79 year old female with a history of chronic kidney disease, hypertension, hyperlipidemia, hypothyroidism and type 2 diabetes mellitus who was was brought in by EMS from home for evaluation of fall. As per the EMS report, she fell earlier today at home, landed on buttock and hit back of her head to the door frame. She had momentary loss of consciousness after the fall. Here in the ED, patient does not remember anything regarding fall, states she has been feeling generalized weakness, tired and fatigued over past couple days. She has also been having cough which has been worsening over past couple days. She endorses mild throat irritation otherwise denies fever, chills, head ache, body aches, chest pain, abdominal pain, shortness of breath, dysuria, change in bladder or bowel changes. Her appetite has been low at baseline. She live at home with her family.     Independent Historian   None Professional Uzbek speaking  used.     Review of External Notes   Urgent care office visit on 1/21/2024 for persistent cough    Past Medical History     Medical History and Problem List   CKD   HTN   Hyperlipidemia  Type 2 diabetes  Hypothyroidism    Medications   Amlodipine    Atorvastatin   Glipizide    insulin glargine   Levothyroxine    Losartan   Memantine    Metformin    Trazodone     Surgical History   Salivary gland surgery   Breast surgery   Physical Exam     Patient Vitals for the past 24 hrs:   BP Temp Temp src Pulse Resp SpO2 Height Weight   12/26/24 1415 (!) 151/64 -- -- 62 15 99 % -- --   12/26/24 1310 130/58 97.5  F (36.4  C) Oral 58 16 98 % 1.57 m (5' 1.81\") 47.6 kg (104 lb 15 oz)     Physical Exam  Constitutional: Well developed, nontox appearance  Head: Atraumatic.  No facial trauma.  Mouth/Throat: Oropharynx is clear and moist.   Neck:  no stridor, no " C-spine tenderness  Eyes: no scleral icterus, EOMI, PERRL  Cardiovascular: RRR, 2+ bilat radial pulses  Pulmonary/Chest: nml resp effort, Clear BS bilat  Abdominal: ND, soft, NT, no rebound or guarding   Back: No T or L-spine tenderness  Ext: Warm, well perfused, no edema, no obvious deformities or tenderness  Neurological: A&O, symmetric facies, moves ext x4  Skin: Skin is warm and dry.   Psychiatric: Behavior is normal. Thought content normal.   Nursing note and vitals reviewed.      Diagnostics     Lab Results   Labs Ordered and Resulted from Time of ED Arrival to Time of ED Departure   COMPREHENSIVE METABOLIC PANEL - Abnormal       Result Value    Sodium 139      Potassium 4.1      Carbon Dioxide (CO2) 22      Anion Gap 13      Urea Nitrogen 26.8 (*)     Creatinine 1.36 (*)     GFR Estimate 39 (*)     Calcium 11.3 (*)     Chloride 104      Glucose 151 (*)     Alkaline Phosphatase 98      AST 23      ALT 31      Protein Total 7.9      Albumin 4.1      Bilirubin Total 0.7     ROUTINE UA WITH MICROSCOPIC - Abnormal    Color Urine Light Yellow      Appearance Urine Clear      Glucose Urine Negative      Bilirubin Urine Negative      Ketones Urine Negative      Specific Gravity Urine 1.011      Blood Urine Negative      pH Urine 6.5      Protein Albumin Urine Negative      Urobilinogen Urine Normal      Nitrite Urine Negative      Leukocyte Esterase Urine Negative      Mucus Urine Present (*)     RBC Urine <1      WBC Urine 1     TROPONIN T, HIGH SENSITIVITY - Abnormal    Troponin T, High Sensitivity 16 (*)    INFLUENZA A/B, RSV AND SARS-COV2 PCR - Normal    Influenza A PCR Negative      Influenza B PCR Negative      RSV PCR Negative      SARS CoV2 PCR Negative     TROPONIN T, HIGH SENSITIVITY - Normal    Troponin T, High Sensitivity 14     CBC WITH PLATELETS AND DIFFERENTIAL    WBC Count 7.1      RBC Count 4.36      Hemoglobin 13.2      Hematocrit 38.2      MCV 88      MCH 30.3      MCHC 34.6      RDW 12.6       Platelet Count 240      % Neutrophils 52      % Lymphocytes 37      % Monocytes 8      % Eosinophils 3      % Basophils 1      % Immature Granulocytes 0      NRBCs per 100 WBC 0      Absolute Neutrophils 3.7      Absolute Lymphocytes 2.6      Absolute Monocytes 0.5      Absolute Eosinophils 0.2      Absolute Basophils 0.1      Absolute Immature Granulocytes 0.0      Absolute NRBCs 0.0         Imaging   XR Chest 2 Views   Final Result   IMPRESSION: No evidence of acute cardiopulmonary disease. Known   pulmonary nodules better seen on prior CT.      UBALDO PERALTA MD            SYSTEM ID:  [id number]      CT Head w/o Contrast   Final Result   IMPRESSION:   No acute intracranial abnormality. Chronic changes.         BRIAN DOBBS MD            SYSTEM ID:  [id number]      Dobutamine Stress Echocardiogram    (Results Pending)       EKG   ECG results from 12/26/24   EKG 12-lead, tracing only     Value    Systolic Blood Pressure     Diastolic Blood Pressure     Ventricular Rate 60    Atrial Rate 60    WV Interval 236    QRS Duration 94        QTc 436    P Axis 76    R AXIS 73    T Axis 205    Interpretation ECG      Sinus rhythm with 1st degree A-V block with Premature atrial complexes  ST & T wave abnormality, consider inferolateral ischemia  New T wave inversions in V5- V6 compared to 05/25/2022.   Interepreted by me at 1315        Independent Interpretation   CT head negative for intracranial hemorrhage    EKG significant for normal sinus rhythm with new T wave inversions in V4 through V6 persistent recheck    ED Course      Medications Administered   Medications - No data to display    Procedures   Procedures     Discussion of Management   None    ED Course   ED Course as of 12/26/24 1739   Thu Dec 26, 2024   1315 I obtained the history and examined the patient as above.        Additional Documentation  Social determinants include age increasing risk for presentation to the emergency department    Medical Decision  Making / Diagnosis     CMS Diagnoses: None    MIPS       None    [facility]   Gregoria Gan is a 79 year old female presenting with fall, generalized weakness    Differential diagnosis includes intracranial hemorrhage, contusion, mechanical fall, infection such as UTI or pneumonia, ACS.  EKG interpretation as noted above.  Labs significant for minimal troponin elevation resolved on recheck otherwise reassuring workup without evidence of urinary tract infection, intracranial hemorrhage or pneumonia patient remained stable throughout her emergency department stay.  I do not feel admission is indicated this time given she has no.  Chest pain or shortness of breath but I think it would be reasonable to have her follow-up for stress test given her new EKG changes.  Patient feels comfortable discharging home.  Patient and  counseled on results, diagnosis and disposition via vitamins  prior to discharge.  They are understanding agreeable to plan.  The patient was subsequently discharged stable condition.  Stress test and cardiology referral ordered prior to discharge.    Disposition   The patient was discharged.     Diagnosis     ICD-10-CM    1. Fall at home, initial encounter  W19.XXXA     Y92.009       2. Generalized weakness  R53.1       3. Nonspecific abnormal electrocardiogram (ECG) (EKG)  R94.31 Follow-Up with Cardiology     Dobutamine Stress Echocardiogram           Discharge Medications   New Prescriptions    No medications on file         Scribe Disclosure:  Nancy THORPE, am serving as a scribe at 1:04 PM on 12/26/2024 to document services personally performed by Joe Becerra MD based on my observations and the provider's statements to me.        Joe Becerra MD  12/26/24 5096

## 2024-12-26 NOTE — ED NOTES
Vital signs reviewed.  Nursing note reviewed.  Constitutional: Well-developed, Well-nourished.  Non-diaphoretic.  Mild distress    HENT: No evidence of head trauma.  No pain or instability to palpation of bony orbits, mandible, maxilla.  Good jaw opening.  No malocclusion.  No nasal septal hematoma.  OP clear and moist.    EYES:  PERRL.  EOMI.  NECK:  No Cspine tenderness.  Trachea midline.  Full ROM.  Supple. No stridor.  CARDIAC:  RRR. 2+ bilat radial pulses   CHEST:  chest NT  PULM: Effort  Normal.  Breath sounds clear and equal bilat  ABD:  Soft, NT/ND  No guarding, no rebound.    : No CVA T.    BACK:  No T or L spinous process tenderness.  Pelvis stable.  EXT:  Full ROM X4.  No tenderness, edema, crepitus or obvious deformity other than that noted below***.    NEURO:  Alert, Oriented X3.  5/5 UE and LE, proximal and distal.  Sensation intact to LT.   SKIN :  Warm.  Dry.   No erythema.  No rash  PSYCH.:  Normal judgment.  Normal affect.      MDM    Social determinants affecting patient's health include: ***     I reviewed medical records from: ***    Independent interpretations: CT head ***    79 year old female presenting w/ head*** injury s/p mechanical fall    DDx includes mechanical fall, fracture, contusion, intracranial hemorrhage, skull fracture.  Doubt seizure, syncope, CVA given history and physical exam.  No other evidence of trauma on full primary and secondary trauma surveys other than areas imaged above or documented in physical exam***.  Given mechanical fall and no other complaints, EKG and blood work deferred. Imaging sig for ***.  Interventions as noted above.    ***At this time I feel the pt is safe for discharge.  Recommendations given regarding follow up with PCP*** and return to the emergency department as needed for new or worsening symptoms.  *** counseled on all results, disposition and diagnosis.  They are understanding and agreeable to plan. Patient admitted***discharged*** in ***  condition.

## 2024-12-26 NOTE — ED TRIAGE NOTES
Patient brought in by EMS from home. EMS reports patient had a fall where she landed on her buttocks and fell backwards striking her head on doorframe. After incident family noted a brief LOC. Patient placed in C-Collar per EMS. Patient denies pain, complains of feeling tired. Patient denies any other symptoms. Patient triaged and assessed with use of translation line.     Triage Assessment (Adult)       Row Name 12/26/24 1311          Triage Assessment    Airway WDL WDL        Respiratory WDL    Respiratory WDL WDL        Skin Circulation/Temperature WDL    Skin Circulation/Temperature WDL WDL        Cardiac WDL    Cardiac WDL WDL        Peripheral/Neurovascular WDL    Peripheral Neurovascular WDL WDL        Cognitive/Neuro/Behavioral WDL    Cognitive/Neuro/Behavioral WDL WDL

## 2024-12-27 ENCOUNTER — PATIENT OUTREACH (OUTPATIENT)
Dept: INTERNAL MEDICINE | Facility: CLINIC | Age: 79
End: 2024-12-27
Payer: COMMERCIAL

## 2024-12-27 NOTE — TELEPHONE ENCOUNTER
Patient Contact    Attempt # 1    Was call answered?  No.  Left message on voicemail with information to call back.     Gregoria Mendoza RN

## 2024-12-30 NOTE — TELEPHONE ENCOUNTER
Called and spoke with pt via . Pt gave verbal consent to speak to  about her health information.      Pts  declined a hospital follow up at this time.        Transitions of Care Outreach  Chief Complaint   Patient presents with    Hospital F/U       Most Recent Admission Date: 12/26/2024   Most Recent Admission Diagnosis:      Most Recent Discharge Date: 12/26/2024   Most Recent Discharge Diagnosis: Fall at home, initial encounter - W19.XXXA, Y92.009  Generalized weakness - R53.1  Nonspecific abnormal electrocardiogram (ECG) (EKG) - R94.31     Transitions of Care Assessment    Discharge Assessment  How are you doing now that you are home?: better  How are your symptoms? (Red Flag symptoms escalate to triage hotline per guidelines): Improved  Do you know how to contact your clinic care team if you have future questions or changes to your health status? : Yes  Does the patient have their discharge instructions? : Yes  Does the patient have questions regarding their discharge instructions? : No  Were you started on any new medications or were there changes to any of your previous medications? : No  Does the patient have all of their medications?: Yes  Do you have questions regarding any of your medications? : No  Do you have all of your needed medical supplies or equipment (DME)?  (i.e. oxygen tank, CPAP, cane, etc.):  (None)    Follow up Plan          No future appointments.    Outpatient Plan as outlined on AVS reviewed with patient.    For any urgent concerns, please contact our 24 hour nurse triage line: [phone] ([phone])       Cecily Escamilla RN

## 2025-01-10 DIAGNOSIS — E11.29 TYPE 2 DIABETES MELLITUS WITH MICROALBUMINURIA, WITH LONG-TERM CURRENT USE OF INSULIN (H): ICD-10-CM

## 2025-01-10 DIAGNOSIS — R80.9 TYPE 2 DIABETES MELLITUS WITH MICROALBUMINURIA, WITH LONG-TERM CURRENT USE OF INSULIN (H): ICD-10-CM

## 2025-01-10 DIAGNOSIS — Z79.4 TYPE 2 DIABETES MELLITUS WITH MICROALBUMINURIA, WITH LONG-TERM CURRENT USE OF INSULIN (H): ICD-10-CM

## 2025-01-13 RX ORDER — PEN NEEDLE, DIABETIC 29 G X1/2"
NEEDLE, DISPOSABLE MISCELLANEOUS
Qty: 100 EACH | Refills: 0 | Status: SHIPPED | OUTPATIENT
Start: 2025-01-13

## 2025-01-13 NOTE — TELEPHONE ENCOUNTER
Prescription approved per [facility] Refill Protocol.  Romana Quezada RN  [facility]

## 2025-01-17 ENCOUNTER — HOSPITAL ENCOUNTER (OUTPATIENT)
Dept: CARDIOLOGY | Facility: CLINIC | Age: 80
Discharge: HOME | End: 2025-01-17
Attending: EMERGENCY MEDICINE | Admitting: INTERNAL MEDICINE
Payer: COMMERCIAL

## 2025-01-17 ENCOUNTER — HOSPITAL ENCOUNTER (OUTPATIENT)
Facility: CLINIC | Age: 80
Discharge: HOME | End: 2025-01-17
Admitting: INTERNAL MEDICINE
Payer: COMMERCIAL

## 2025-01-17 VITALS — BODY MASS INDEX: 19.31 KG/M2 | WEIGHT: 104.94 LBS

## 2025-01-17 DIAGNOSIS — R94.31 NONSPECIFIC ABNORMAL ELECTROCARDIOGRAM (ECG) (EKG): ICD-10-CM

## 2025-01-17 PROCEDURE — 93325 DOPPLER ECHO COLOR FLOW MAPG: CPT | Mod: 26 | Performed by: INTERNAL MEDICINE

## 2025-01-17 PROCEDURE — 999N000049 HC STATISTIC ECHO STRESS OR NM NPI

## 2025-01-17 PROCEDURE — 250N000011 HC RX IP 250 OP 636: Performed by: INTERNAL MEDICINE

## 2025-01-17 PROCEDURE — 93350 STRESS TTE ONLY: CPT | Mod: 26 | Performed by: INTERNAL MEDICINE

## 2025-01-17 PROCEDURE — 93325 DOPPLER ECHO COLOR FLOW MAPG: CPT | Mod: TC

## 2025-01-17 PROCEDURE — C8928 TTE W OR W/O FOL W/CON,STRES: HCPCS

## 2025-01-17 PROCEDURE — 255N000002 HC RX 255 OP 636: Performed by: EMERGENCY MEDICINE

## 2025-01-17 PROCEDURE — 93016 CV STRESS TEST SUPVJ ONLY: CPT | Performed by: INTERNAL MEDICINE

## 2025-01-17 PROCEDURE — 93018 CV STRESS TEST I&R ONLY: CPT | Performed by: INTERNAL MEDICINE

## 2025-01-17 PROCEDURE — 93321 DOPPLER ECHO F-UP/LMTD STD: CPT | Mod: 26 | Performed by: INTERNAL MEDICINE

## 2025-01-17 RX ORDER — SODIUM CHLORIDE 9 MG/ML
INJECTION, SOLUTION INTRAVENOUS CONTINUOUS PRN
Status: DISCONTINUED | OUTPATIENT
Start: 2025-01-17 | End: 2025-01-17 | Stop reason: HOSPADM

## 2025-01-17 RX ORDER — ATROPINE SULFATE 0.4 MG/ML
.2-.4 AMPUL (ML) INJECTION
Status: DISCONTINUED | OUTPATIENT
Start: 2025-01-17 | End: 2025-01-17 | Stop reason: HOSPADM

## 2025-01-17 RX ORDER — LORAZEPAM 0.5 MG/1
0.5 TABLET ORAL EVERY 30 MIN PRN
Status: DISCONTINUED | OUTPATIENT
Start: 2025-01-17 | End: 2025-01-17 | Stop reason: HOSPADM

## 2025-01-17 RX ORDER — DIAZEPAM 5 MG/1
5 TABLET ORAL EVERY 30 MIN PRN
Status: DISCONTINUED | OUTPATIENT
Start: 2025-01-17 | End: 2025-01-17 | Stop reason: HOSPADM

## 2025-01-17 RX ORDER — NITROGLYCERIN 0.4 MG/1
0.4 TABLET SUBLINGUAL EVERY 5 MIN PRN
Status: DISCONTINUED | OUTPATIENT
Start: 2025-01-17 | End: 2025-01-17 | Stop reason: HOSPADM

## 2025-01-17 RX ORDER — LIDOCAINE 40 MG/G
CREAM TOPICAL
Status: DISCONTINUED | OUTPATIENT
Start: 2025-01-17 | End: 2025-01-17 | Stop reason: HOSPADM

## 2025-01-17 RX ORDER — METOPROLOL TARTRATE 1 MG/ML
1-5 INJECTION, SOLUTION INTRAVENOUS
Status: DISCONTINUED | OUTPATIENT
Start: 2025-01-17 | End: 2025-01-17 | Stop reason: HOSPADM

## 2025-01-17 RX ORDER — DOBUTAMINE HYDROCHLORIDE 200 MG/100ML
10-50 INJECTION INTRAVENOUS CONTINUOUS
Status: DISCONTINUED | OUTPATIENT
Start: 2025-01-17 | End: 2025-01-17 | Stop reason: HOSPADM

## 2025-01-17 RX ADMIN — PERFLUTREN 10 ML: 6.52 INJECTION, SUSPENSION INTRAVENOUS at 14:47

## 2025-01-17 RX ADMIN — DOBUTAMINE HYDROCHLORIDE 10 MCG/KG/MIN: 200 INJECTION INTRAVENOUS at 14:15

## 2025-01-17 ASSESSMENT — ACTIVITIES OF DAILY LIVING (ADL): ADLS_ACUITY_SCORE: 41

## 2025-01-17 NOTE — PROGRESS NOTES
1345 Dobutamine Stress Test: Pt tolerated well. VSS.  standard protocol used. Pt denied chest pain, pressure or SOB prior to test. During procedure pt reported leg pain was restless during procedure but denied Chest pain or SOB.    Language line solutions live jabber used during the stress test - Kimmy sosaep.   Dr Devries observing the stress test     1450 Pt discharged per ambulatory to waiting room. All personal belongings taken with pt. Family () waiting for pt.  Patient denies any chest pain or SOB now or during procedure.  Vital signs remained stable and back to baseline.

## 2025-01-19 NOTE — PROGRESS NOTES
Reason for Consultation: Abnormal EKG, generalized weakness.      History of Presenting Illness: This is an 79-year-old female with a past medical history significant for CKD, diabetes and hypertension who was seen in the emergency department on 12/26/2024 after a fall in the context of generalized weakness and fatigue.    Her evaluation in the emergency department was essentially unremarkable with a negative head CT.  An EKG, however, demonstrated normal sinus rhythm with left ventricular hypertrophy and probable secondary ST-T wave abnormalities that were pronounced in the inferolateral leads.  Cardiac troponins were minimally elevated.  A dobutamine stress echocardiogram was subsequently performed on 1/17/2025 which was negative for ischemia.      Today she presented accompanied by her  and the interview was conducted with the assistance of an online .  She has significant cognitive impairment and her  provided most of the history.  It appears at the time of the emergency department visit she had been having upper respiratory tract like symptoms and a poor appetite.  ]    She has had a chronic cough for the past few months and has been struggling with constipation.  Her weakness appears to have improved, and today she denies any chest discomfort or dyspnea performing her normal everyday activities.    PMH, FH, SH, Allergies and Meds: As outlined below.    Physical Exam:    Gen: NAD  Respiratory : Clear to Auscultation.  Cardiovascular: RRR, 2/6 systolic extra murmur right upper sternal border  Extremities: No edema.      EKG: Her EKG dated 12/26/2024 was independently reviewed and interpreted.  I agree that it demonstrates sinus rhythm with left ventricular hypertrophy and prominent ST-T wave abnormalities in the lateral leads.  The EKG is similar to her previous tracing from 5/25/2022 although the ST-T wave abnormalities may be slightly more pronounced..    Labs: Lipids on 4/18/2024  demonstrated total cholesterol 139, HDL 41, LDL of 59 and triglycerides of 194.    A basic metabolic panel on 12/26/2024 demonstrated sodium 139, potassium 4.1 and creatinine of 1.36.    Other Cardiac Testing:    She underwent a dobutamine stress echocardiogram on 1/17/2025 which was independently reviewed and interpreted.  This was negative for ischemia.  The resting echocardiogram did demonstrate significant left ventricular hypertrophy involving the basal septal segments.      I also personally reviewed and independently interpreted her echocardiogram on 11/17/2007 which also demonstrated septal hypertrophy although was technically difficult.    She has also undergone stress perfusion imaging in July 2011 which was negative for ischemia.    I do note that she underwent a chest x-ray on 12/26/2024 which was negative for acute cardiopulmonary disease.  She has previously undergone a CT of the chest with contrast on 11/21/2024 which demonstrated stable pulmonary nodules and basilar predominant fibrosis.      IMPRESSION:    Abnormal EKG with evidence of left ventricular hypertrophy and probable secondary ST-T wave abnormalities.  2.  Normal dobutamine stress echocardiogram as described above.  3.  Cognitive impairment.  4.  Chronic cough.    PLAN:    Ms. Gan presents for an evaluation regarding a recent emergency department presentation for generalized weakness which has subsequently improved.  Her EKG was abnormal with evidence of LVH and secondary ST-T wave abnormalities, but these are similar to her previous EKG from 2022 and her recent stress echocardiogram was negative for ischemia.    As stated above, I reviewed all her previous echocardiograms and significant septal hypertrophy is present but this is unlikely to represent an infiltrative cardiomyopathy such as hypertrophic obstructive cardiomyopathy.  Nevertheless, image quality was quite limited and I have asked for a repeat echocardiogram for a reassessment  in this regard.  Given her cognitive impairment and minimal symptoms I am reluctant to recommend a cardiovascular MRI given that this would be of limited utility.    I have encouraged her to discuss her chronic cough and constipation issues with her primary care physician.    65 minutes today spent pre and post charting, reviewing pertinent previous records as well as imaging studies personally and discussing and coordinating further evaluation for the patient's symptoms.      Snatosh Voss M.D.       CURRENT MEDICATIONS:  Current Outpatient Medications   Medication Sig Dispense Refill    amLODIPine (NORVASC) 10 MG tablet Take 1 tablet (10 mg) by mouth daily. 90 tablet 3    atorvastatin (LIPITOR) 20 MG tablet Take 1 tablet (20 mg) by mouth daily 90 tablet 3    Continuous Glucose Sensor (FREESTYLE TESS 3 SENSOR) MISC 1 each every 14 days Use 1 sensor every 14 days. Use to read blood sugars per 's instructions. 6 each 5    cyanocobalamin (VITAMIN B-12) 1000 MCG tablet Take 1 tablet (1,000 mcg) by mouth daily      glipiZIDE (GLUCOTROL) 5 MG tablet Take 1 tablet (5 mg) by mouth daily (before lunch). 90 tablet 1    insulin glargine (LANTUS SOLOSTAR) 100 UNIT/ML pen Inject 16 Units subcutaneously every morning. 15 mL 1    levothyroxine (SYNTHROID/LEVOTHROID) 75 MCG tablet Take 1 tablet (75 mcg) by mouth daily 90 tablet 3    losartan (COZAAR) 100 MG tablet Take 1 tablet (100 mg) by mouth daily 90 tablet 3    magnesium 250 MG tablet Take 1 tablet by mouth daily.      memantine (NAMENDA) 5 MG tablet Take 2 tablets (10 mg) by mouth daily. 180 tablet 2    Menaquinone-7 (VITAMIN K2) 100 MCG CAPS Take 100 mg by mouth every other day OTC patient reported.      metFORMIN (GLUCOPHAGE XR) 500 MG 24 hr tablet Take 1 tablet (500 mg) by mouth daily (with dinner) 90 tablet 3    MULTIVITAMIN TABS   OR 1 TABLET DAILY      Probiotic Product (PROBIOTIC DAILY) CAPS Take 1 each by mouth daily OTC patient reported.      traZODone  (DESYREL) 100 MG tablet Take 100 mg by mouth nightly as needed for sleep.      [employer]IC[employer] PEN NEEDLES 31G X 5 MM miscellaneous USE 1 PEN NEEDLE DAILY AS DIRECTED 100 each 0    Vitamin D3 (CHOLECALCIFEROL) 25 mcg (1000 units) tablet Take 1 tablet (25 mcg) by mouth daily         ALLERGIES   No Known Allergies    PAST MEDICAL HISTORY:  Past Medical History:   Diagnosis Date    CKD (chronic kidney disease) stage 3, GFR 30-59 ml/min (H)     HTN (hypertension), benign     Other and unspecified hyperlipidemia     Type 2 diabetes, HbA1c goal < 7% (H)     Unspecified hypothyroidism        PAST SURGICAL HISTORY:  Past Surgical History:   Procedure Laterality Date    [facility] NONSPECIFIC PROCEDURE  1996    left side saliva gland removal    [facility] NONSPECIFIC PROCEDURE  2006    breast augmentation removal    [facility] NONSPECIFIC PROCEDURE  1977    breast augmentation       FAMILY HISTORY:  Family History   Problem Relation Age of Onset    Diabetes Mother     Hypertension Mother     Lipids Mother     Diabetes Sister     Hypertension Sister     Lipids Sister     Heart Disease Paternal Grandfather        SOCIAL HISTORY:  Social History     Socioeconomic History    Marital status:     Number of children: 2   Occupational History    Occupation:      Employer: RETIRED   Tobacco Use    Smoking status: Never    Smokeless tobacco: Never   Vaping Use    Vaping status: Never Used   Substance and Sexual Activity    Alcohol use: No     Alcohol/week: 0.0 standard drinks of alcohol    Drug use: No    Sexual activity: Yes     Partners: Male     Social Drivers of Health     Financial Resource Strain: Unknown (4/25/2024)    Financial Resource Strain     Within the past 12 months, have you or your family members you live with been unable to get utilities (heat, electricity) when it was really needed?: Patient declined   Food Insecurity: Unknown (4/25/2024)    Food Insecurity     Within the past 12 months, did you worry that your food  would run out before you got money to buy more?: Patient declined     Within the past 12 months, did the food you bought just not last and you didn t have money to get more?: Patient declined   Transportation Needs: Unknown (4/25/2024)    Transportation Needs     Within the past 12 months, has lack of transportation kept you from medical appointments, getting your medicines, non-medical meetings or appointments, work, or from getting things that you need?: Patient declined   Physical Activity: Insufficiently Active (4/25/2024)    Exercise Vital Sign     Days of Exercise per Week: 7 days     Minutes of Exercise per Session: 10 min   Stress: No Stress Concern Present (4/25/2024)    [employer] - Occupational Stress Questionnaire     Feeling of Stress : Not at all   Social Connections: Unknown (4/25/2024)    Social Connection and Isolation Panel [NHANES]     Frequency of Social Gatherings with Friends and Family: More than three times a week   Housing Stability: Unknown (4/25/2024)    Housing Stability     Do you have housing? : Patient declined     Are you worried about losing your housing?: Patient declined       Review of Systems:  Skin:          Eyes:         ENT:         Respiratory:          Cardiovascular:         Gastroenterology:        Genitourinary:         Musculoskeletal:         Neurologic:         Psychiatric:         Heme/Lymph/Imm:         Endocrine:           Physical Exam:  Vitals: LMP  (LMP Unknown)     Constitutional:           Skin:             Head:           Eyes:           Lymph:      ENT:           Neck:           Respiratory:            Cardiac:                                                           GI:           Extremities and Muscular Skeletal:                 Neurological:           Psych:           CC  Joe Becerra MD  [facility]  [address]  [place] [address]

## 2025-01-20 ENCOUNTER — OFFICE VISIT (OUTPATIENT)
Dept: CARDIOLOGY | Facility: CLINIC | Age: 80
End: 2025-01-20
Attending: EMERGENCY MEDICINE
Payer: COMMERCIAL

## 2025-01-20 VITALS
BODY MASS INDEX: 20.62 KG/M2 | HEART RATE: 79 BPM | RESPIRATION RATE: 15 BRPM | HEIGHT: 60 IN | WEIGHT: 105 LBS | SYSTOLIC BLOOD PRESSURE: 153 MMHG | OXYGEN SATURATION: 93 % | DIASTOLIC BLOOD PRESSURE: 67 MMHG

## 2025-01-20 DIAGNOSIS — R94.31 NONSPECIFIC ABNORMAL ELECTROCARDIOGRAM (ECG) (EKG): ICD-10-CM

## 2025-01-20 PROCEDURE — 99205 OFFICE O/P NEW HI 60 MIN: CPT | Performed by: INTERNAL MEDICINE

## 2025-01-20 NOTE — LETTER
1/20/2025    Braxton Boone MD  [address]  [facility] [address]    RE: Gregoria Gan       Dear Colleague,     I had the pleasure of seeing Gregoria Gan in the [facility].      Reason for Consultation: Abnormal EKG, generalized weakness.      History of Presenting Illness: This is an 79-year-old female with a past medical history significant for CKD, diabetes and hypertension who was seen in the emergency department on 12/26/2024 after a fall in the context of generalized weakness and fatigue.    Her evaluation in the emergency department was essentially unremarkable with a negative head CT.  An EKG, however, demonstrated normal sinus rhythm with left ventricular hypertrophy and probable secondary ST-T wave abnormalities that were pronounced in the inferolateral leads.  Cardiac troponins were minimally elevated.  A dobutamine stress echocardiogram was subsequently performed on 1/17/2025 which was negative for ischemia.      Today she presented accompanied by her  and the interview was conducted with the assistance of an online .  She has significant cognitive impairment and her  provided most of the history.  It appears at the time of the emergency department visit she had been having upper respiratory tract like symptoms and a poor appetite.  ]    She has had a chronic cough for the past few months and has been struggling with constipation.  Her weakness appears to have improved, and today she denies any chest discomfort or dyspnea performing her normal everyday activities.    PMH, FH, SH, Allergies and Meds: As outlined below.    Physical Exam:    Gen: NAD  Respiratory : Clear to Auscultation.  Cardiovascular: RRR, 2/6 systolic extra murmur right upper sternal border  Extremities: No edema.      EKG: Her EKG dated 12/26/2024 was independently reviewed and interpreted.  I agree that it demonstrates sinus rhythm with left ventricular  hypertrophy and prominent ST-T wave abnormalities in the lateral leads.  The EKG is similar to her previous tracing from 5/25/2022 although the ST-T wave abnormalities may be slightly more pronounced..    Labs: Lipids on 4/18/2024 demonstrated total cholesterol 139, HDL 41, LDL of 59 and triglycerides of 194.    A basic metabolic panel on 12/26/2024 demonstrated sodium 139, potassium 4.1 and creatinine of 1.36.    Other Cardiac Testing:    She underwent a dobutamine stress echocardiogram on 1/17/2025 which was independently reviewed and interpreted.  This was negative for ischemia.  The resting echocardiogram did demonstrate significant left ventricular hypertrophy involving the basal septal segments.      I also personally reviewed and independently interpreted her echocardiogram on 11/17/2007 which also demonstrated septal hypertrophy although was technically difficult.    She has also undergone stress perfusion imaging in July 2011 which was negative for ischemia.    I do note that she underwent a chest x-ray on 12/26/2024 which was negative for acute cardiopulmonary disease.  She has previously undergone a CT of the chest with contrast on 11/21/2024 which demonstrated stable pulmonary nodules and basilar predominant fibrosis.      IMPRESSION:    Abnormal EKG with evidence of left ventricular hypertrophy and probable secondary ST-T wave abnormalities.  2.  Normal dobutamine stress echocardiogram as described above.  3.  Cognitive impairment.  4.  Chronic cough.    PLAN:    Ms. Gan presents for an evaluation regarding a recent emergency department presentation for generalized weakness which has subsequently improved.  Her EKG was abnormal with evidence of LVH and secondary ST-T wave abnormalities, but these are similar to her previous EKG from 2022 and her recent stress echocardiogram was negative for ischemia.    As stated above, I reviewed all her previous echocardiograms and significant septal hypertrophy is  present but this is unlikely to represent an infiltrative cardiomyopathy such as hypertrophic obstructive cardiomyopathy.  Nevertheless, image quality was quite limited and I have asked for a repeat echocardiogram for a reassessment in this regard.  Given her cognitive impairment and minimal symptoms I am reluctant to recommend a cardiovascular MRI given that this would be of limited utility.    I have encouraged her to discuss her chronic cough and constipation issues with her primary care physician.    65 minutes today spent pre and post charting, reviewing pertinent previous records as well as imaging studies personally and discussing and coordinating further evaluation for the patient's symptoms.      Santosh Voss M.D.       CURRENT MEDICATIONS:  Current Outpatient Medications   Medication Sig Dispense Refill     amLODIPine (NORVASC) 10 MG tablet Take 1 tablet (10 mg) by mouth daily. 90 tablet 3     atorvastatin (LIPITOR) 20 MG tablet Take 1 tablet (20 mg) by mouth daily 90 tablet 3     Continuous Glucose Sensor (FREESTYLE TESS 3 SENSOR) MISC 1 each every 14 days Use 1 sensor every 14 days. Use to read blood sugars per 's instructions. 6 each 5     cyanocobalamin (VITAMIN B-12) 1000 MCG tablet Take 1 tablet (1,000 mcg) by mouth daily       glipiZIDE (GLUCOTROL) 5 MG tablet Take 1 tablet (5 mg) by mouth daily (before lunch). 90 tablet 1     insulin glargine (LANTUS SOLOSTAR) 100 UNIT/ML pen Inject 16 Units subcutaneously every morning. 15 mL 1     levothyroxine (SYNTHROID/LEVOTHROID) 75 MCG tablet Take 1 tablet (75 mcg) by mouth daily 90 tablet 3     losartan (COZAAR) 100 MG tablet Take 1 tablet (100 mg) by mouth daily 90 tablet 3     magnesium 250 MG tablet Take 1 tablet by mouth daily.       memantine (NAMENDA) 5 MG tablet Take 2 tablets (10 mg) by mouth daily. 180 tablet 2     Menaquinone-7 (VITAMIN K2) 100 MCG CAPS Take 100 mg by mouth every other day OTC patient reported.       metFORMIN  (GLUCOPHAGE XR) 500 MG 24 hr tablet Take 1 tablet (500 mg) by mouth daily (with dinner) 90 tablet 3     MULTIVITAMIN TABS   OR 1 TABLET DAILY       Probiotic Product (PROBIOTIC DAILY) CAPS Take 1 each by mouth daily OTC patient reported.       traZODone (DESYREL) 100 MG tablet Take 100 mg by mouth nightly as needed for sleep.       ULTICARE PEN NEEDLES 31G X 5 MM miscellaneous USE 1 PEN NEEDLE DAILY AS DIRECTED 100 each 0     Vitamin D3 (CHOLECALCIFEROL) 25 mcg (1000 units) tablet Take 1 tablet (25 mcg) by mouth daily         ALLERGIES   No Known Allergies    PAST MEDICAL HISTORY:  Past Medical History:   Diagnosis Date     CKD (chronic kidney disease) stage 3, GFR 30-59 ml/min (H)      HTN (hypertension), benign      Other and unspecified hyperlipidemia      Type 2 diabetes, HbA1c goal < 7% (H)      Unspecified hypothyroidism        PAST SURGICAL HISTORY:  Past Surgical History:   Procedure Laterality Date     [facility] NONSPECIFIC PROCEDURE  1996    left side saliva gland removal     [facility] NONSPECIFIC PROCEDURE  2006    breast augmentation removal     [facility] NONSPECIFIC PROCEDURE  1977    breast augmentation       FAMILY HISTORY:  Family History   Problem Relation Age of Onset     Diabetes Mother      Hypertension Mother      Lipids Mother      Diabetes Sister      Hypertension Sister      Lipids Sister      Heart Disease Paternal Grandfather        SOCIAL HISTORY:  Social History     Socioeconomic History     Marital status:      Number of children: 2   Occupational History     Occupation:      Employer: RETIRED   Tobacco Use     Smoking status: Never     Smokeless tobacco: Never   Vaping Use     Vaping status: Never Used   Substance and Sexual Activity     Alcohol use: No     Alcohol/week: 0.0 standard drinks of alcohol     Drug use: No     Sexual activity: Yes     Partners: Male     Social Drivers of Health     Financial Resource Strain: Unknown (4/25/2024)    Financial Resource Strain      Within  the past 12 months, have you or your family members you live with been unable to get utilities (heat, electricity) when it was really needed?: Patient declined   Food Insecurity: Unknown (4/25/2024)    Food Insecurity      Within the past 12 months, did you worry that your food would run out before you got money to buy more?: Patient declined      Within the past 12 months, did the food you bought just not last and you didn t have money to get more?: Patient declined   Transportation Needs: Unknown (4/25/2024)    Transportation Needs      Within the past 12 months, has lack of transportation kept you from medical appointments, getting your medicines, non-medical meetings or appointments, work, or from getting things that you need?: Patient declined   Physical Activity: Insufficiently Active (4/25/2024)    Exercise Vital Sign      Days of Exercise per Week: 7 days      Minutes of Exercise per Session: 10 min   Stress: No Stress Concern Present (4/25/2024)    [employer] - Occupational Stress Questionnaire      Feeling of Stress : Not at all   Social Connections: Unknown (4/25/2024)    Social Connection and Isolation Panel [NHANES]      Frequency of Social Gatherings with Friends and Family: More than three times a week   Housing Stability: Unknown (4/25/2024)    Housing Stability      Do you have housing? : Patient declined      Are you worried about losing your housing?: Patient declined       Review of Systems:  Skin:          Eyes:         ENT:         Respiratory:          Cardiovascular:         Gastroenterology:        Genitourinary:         Musculoskeletal:         Neurologic:         Psychiatric:         Heme/Lymph/Imm:         Endocrine:           Physical Exam:  Vitals: LMP  (LMP Unknown)     Constitutional:           Skin:             Head:           Eyes:           Lymph:      ENT:           Neck:           Respiratory:            Cardiac:                                                            GI:           Extremities and Muscular Skeletal:                 Neurological:           Psych:           CC  Joe Becerra MD  [facility]  [address]  [place] [address]                  Thank you for allowing me to participate in the care of your patient.      Sincerely,     Santosh Voss MD     [facility]  cc:   Joe Becerra MD  [facility]  [address]  [place] [address]

## 2025-02-14 ENCOUNTER — TELEPHONE (OUTPATIENT)
Dept: CARDIOLOGY | Facility: CLINIC | Age: 80
End: 2025-02-14

## 2025-02-14 ENCOUNTER — HOSPITAL ENCOUNTER (OUTPATIENT)
Dept: CARDIOLOGY | Facility: CLINIC | Age: 80
Discharge: HOME | End: 2025-02-14
Attending: INTERNAL MEDICINE | Admitting: INTERNAL MEDICINE
Payer: COMMERCIAL

## 2025-02-14 DIAGNOSIS — R94.31 NONSPECIFIC ABNORMAL ELECTROCARDIOGRAM (ECG) (EKG): ICD-10-CM

## 2025-02-14 LAB — LVEF ECHO: NORMAL

## 2025-02-14 PROCEDURE — 93306 TTE W/DOPPLER COMPLETE: CPT | Mod: 26 | Performed by: INTERNAL MEDICINE

## 2025-02-14 PROCEDURE — 93306 TTE W/DOPPLER COMPLETE: CPT

## 2025-02-14 NOTE — TELEPHONE ENCOUNTER
Echo routed to Dr Voss to review, ordered due to abnormal EKG, hx septal hypertrophy.       1. Normal left ventricular size with hyperdynamic systolic function. Estimated  ejection fraction is 70-75%.  2. Mild-moderate concentrically increased left ventricular wall thickness.  3. Trivial dynamic left ventricular midcavity obstruction (related to left  ventricular apical/mid cavity systolic obliteration). Left ventricular  midcavity peak systolic velocity at rest is 1.2 m/s; with Valsalva 2.4 m/s.  4. Normal right ventricular size and systolic function.  5. Estimated right ventricular systolic pressure is 32 mmHg.  6. No significant valve disease.  7. No significant change compared to rest findings from recent stress  echocardiogram from 1/17/2025.

## 2025-02-18 NOTE — TELEPHONE ENCOUNTER
Attempted to reach patient again via [facility] , no answer but was able to leave a detailed voicemail with results. Pt to see cardiology PRN. Team 2 number provided to call back with any questions ([phone]).

## 2025-03-11 ENCOUNTER — OFFICE VISIT (OUTPATIENT)
Dept: INTERNAL MEDICINE | Facility: CLINIC | Age: 80
End: 2025-03-11
Payer: COMMERCIAL

## 2025-03-11 VITALS
OXYGEN SATURATION: 95 % | RESPIRATION RATE: 14 BRPM | HEART RATE: 69 BPM | SYSTOLIC BLOOD PRESSURE: 129 MMHG | BODY MASS INDEX: 21.22 KG/M2 | TEMPERATURE: 99.1 F | DIASTOLIC BLOOD PRESSURE: 57 MMHG | WEIGHT: 108.1 LBS | HEIGHT: 60 IN

## 2025-03-11 DIAGNOSIS — Z79.4 TYPE 2 DIABETES MELLITUS WITH MICROALBUMINURIA, WITH LONG-TERM CURRENT USE OF INSULIN (H): Primary | ICD-10-CM

## 2025-03-11 DIAGNOSIS — E06.3 AUTOIMMUNE HYPOTHYROIDISM: ICD-10-CM

## 2025-03-11 DIAGNOSIS — N18.32 STAGE 3B CHRONIC KIDNEY DISEASE (H): ICD-10-CM

## 2025-03-11 DIAGNOSIS — K59.01 SLOW TRANSIT CONSTIPATION: ICD-10-CM

## 2025-03-11 DIAGNOSIS — F03.B0 MODERATE DEMENTIA WITHOUT BEHAVIORAL DISTURBANCE, PSYCHOTIC DISTURBANCE, MOOD DISTURBANCE, OR ANXIETY, UNSPECIFIED DEMENTIA TYPE (H): ICD-10-CM

## 2025-03-11 DIAGNOSIS — R80.9 TYPE 2 DIABETES MELLITUS WITH MICROALBUMINURIA, WITH LONG-TERM CURRENT USE OF INSULIN (H): Primary | ICD-10-CM

## 2025-03-11 DIAGNOSIS — E11.29 TYPE 2 DIABETES MELLITUS WITH MICROALBUMINURIA, WITH LONG-TERM CURRENT USE OF INSULIN (H): Primary | ICD-10-CM

## 2025-03-11 LAB
EST. AVERAGE GLUCOSE BLD GHB EST-MCNC: 212 MG/DL
HBA1C MFR BLD: 9 % (ref 0–5.6)

## 2025-03-11 PROCEDURE — 99214 OFFICE O/P EST MOD 30 MIN: CPT | Performed by: INTERNAL MEDICINE

## 2025-03-11 PROCEDURE — 36415 COLL VENOUS BLD VENIPUNCTURE: CPT | Performed by: INTERNAL MEDICINE

## 2025-03-11 PROCEDURE — 83036 HEMOGLOBIN GLYCOSYLATED A1C: CPT | Performed by: INTERNAL MEDICINE

## 2025-03-11 PROCEDURE — 84443 ASSAY THYROID STIM HORMONE: CPT | Performed by: INTERNAL MEDICINE

## 2025-03-11 PROCEDURE — G2211 COMPLEX E/M VISIT ADD ON: HCPCS | Performed by: INTERNAL MEDICINE

## 2025-03-11 PROCEDURE — 3074F SYST BP LT 130 MM HG: CPT | Performed by: INTERNAL MEDICINE

## 2025-03-11 PROCEDURE — 80048 BASIC METABOLIC PNL TOTAL CA: CPT | Performed by: INTERNAL MEDICINE

## 2025-03-11 PROCEDURE — 83721 ASSAY OF BLOOD LIPOPROTEIN: CPT | Mod: 59 | Performed by: INTERNAL MEDICINE

## 2025-03-11 PROCEDURE — 80061 LIPID PANEL: CPT | Performed by: INTERNAL MEDICINE

## 2025-03-11 PROCEDURE — 3078F DIAST BP <80 MM HG: CPT | Performed by: INTERNAL MEDICINE

## 2025-03-11 RX ORDER — INSULIN HUMAN 100 [IU]/ML
18 INJECTION, SUSPENSION SUBCUTANEOUS DAILY
Qty: 6 ML | Refills: 2 | Status: SHIPPED | OUTPATIENT
Start: 2025-03-11

## 2025-03-11 RX ORDER — HYDROCHLOROTHIAZIDE 12.5 MG/1
CAPSULE ORAL
Qty: 6 EACH | Refills: 3 | Status: SHIPPED | OUTPATIENT
Start: 2025-03-11

## 2025-03-11 NOTE — PROGRESS NOTES
Assessment & Plan     Type 2 diabetes mellitus with microalbuminuria, with long-term current use of insulin (H)  Evening # quite poor- adding glipizide to her lantus did little if anything to help.  to keep things simple try using NPH rather than lantus at noon  Discharge the glipizide  - HEMOGLOBIN A1C; Future  - insulin NPH (HUMULIN N KWIKPEN) 100 UNIT/ML injection; Inject 18 Units subcutaneously daily. Before lunch  - Continuous Glucose Sensor (FREESTYLE TESS 3 PLUS SENSOR) MISC; Use 1 sensor every 15 days. Use to read blood sugars per 's instructions.    Moderate dementia without behavioral disturbance, psychotic disturbance, mood disturbance, or anxiety, unspecified dementia type (H)  Cont cares    Slow transit constipation  Rec'd fiber, water intake, more activity    Autoimmune hypothyroidism  - TSH WITH FREE T4 REFLEX; Future    CKD  Recheck labs /Ca+        See Patient Instructions    Subjective   maria teresa vera is a 80 year old, presenting for the following health issues:  Diabetes    History of Present Illness       Diabetes:   She presents for follow up of diabetes.   She is checking home blood glucose with a continuous glucose monitor.   She checks blood glucose before and after meals.  Blood glucose is sometimes over 200 and never under 70.  When her blood glucose is low, the patient is asymptomatic for confusion, blurred vision, lethargy and reports not feeling dizzy, shaky, or weak.  She is concerned about blood sugar frequently over 200.    She is not experiencing numbness or burning in feet, excessive thirst, blurry vision, weight changes or redness, sores or blisters on feet.                              Objective    /57   Pulse 69   Temp 99.1  F (37.3  C) (Temporal)   Resp 14   Ht 1.524 m (5')   Wt 49 kg (108 lb 1.6 oz)   LMP  (LMP Unknown)   SpO2 95%   BMI 21.11 kg/m    Body mass index is 21.11 kg/m .  Physical Exam   GENERAL: alert and no distress    Results for orders  placed or performed in visit on 03/11/25   HEMOGLOBIN A1C     Status: Abnormal   Result Value Ref Range    Estimated Average Glucose 212 (H) <117 mg/dL    Hemoglobin A1C 9.0 (H) 0.0 - 5.6 %    Narrative    Results confirmed by repeat test.           The longitudinal plan of care for the diagnosis(es)/condition(s) as documented were addressed during this visit. Due to the added complexity in care, I will continue to support maria teresa vera in the subsequent management and with ongoing continuity of care.    Signed Electronically by: Braxton Boone MD

## 2025-03-12 LAB
CHOLEST SERPL-MCNC: 139 MG/DL
FASTING STATUS PATIENT QL REPORTED: NO
HDLC SERPL-MCNC: 36 MG/DL
LDLC SERPL CALC-MCNC: ABNORMAL MG/DL
LDLC SERPL DIRECT ASSAY-MCNC: 58 MG/DL
NONHDLC SERPL-MCNC: 103 MG/DL
TRIGL SERPL-MCNC: 489 MG/DL
TSH SERPL DL<=0.005 MIU/L-ACNC: 1.31 UIU/ML (ref 0.3–4.2)

## 2025-03-13 LAB
ANION GAP SERPL CALCULATED.3IONS-SCNC: 9 MMOL/L (ref 7–15)
BUN SERPL-MCNC: 22.5 MG/DL (ref 8–23)
CALCIUM SERPL-MCNC: 11.8 MG/DL (ref 8.8–10.4)
CHLORIDE SERPL-SCNC: 107 MMOL/L (ref 98–107)
CREAT SERPL-MCNC: 1.36 MG/DL (ref 0.51–0.95)
EGFRCR SERPLBLD CKD-EPI 2021: 39 ML/MIN/1.73M2
FASTING STATUS PATIENT QL REPORTED: NO
GLUCOSE SERPL-MCNC: 254 MG/DL (ref 70–99)
HCO3 SERPL-SCNC: 24 MMOL/L (ref 22–29)
POTASSIUM SERPL-SCNC: 4.4 MMOL/L (ref 3.4–5.3)
SODIUM SERPL-SCNC: 140 MMOL/L (ref 135–145)

## 2025-04-02 ENCOUNTER — TELEPHONE (OUTPATIENT)
Dept: INTERNAL MEDICINE | Facility: CLINIC | Age: 80
End: 2025-04-02
Payer: COMMERCIAL

## 2025-04-02 NOTE — TELEPHONE ENCOUNTER
Pt reporting missed call from someone from [facility] about thirty minutes ago, they did not leave their name or reason for call. There is no note in the pt chart as to reason why clinic would be calling pt.      Routing to pcp please review and advise. Fov   Future Appointments 4/2/2025 - 9/29/2025        Date Visit Type Length Department Provider     5/6/2025  2:30 PM ANNUAL WELLNESS 30 min  INTERNAL MEDICINE Braxton Boone MD    Location Instructions:     [facility] is in the [facility] at [address] in [place]. This just east of the [address] exit off of [address]. Free parking is available; access the lot from [address] [place] [address] or [facility].Go directly to Floor 2 for check-in.

## 2025-04-21 DIAGNOSIS — R80.9 TYPE 2 DIABETES MELLITUS WITH MICROALBUMINURIA, WITH LONG-TERM CURRENT USE OF INSULIN (H): ICD-10-CM

## 2025-04-21 DIAGNOSIS — Z79.4 TYPE 2 DIABETES MELLITUS WITH MICROALBUMINURIA, WITH LONG-TERM CURRENT USE OF INSULIN (H): ICD-10-CM

## 2025-04-21 DIAGNOSIS — E11.29 TYPE 2 DIABETES MELLITUS WITH MICROALBUMINURIA, WITH LONG-TERM CURRENT USE OF INSULIN (H): ICD-10-CM

## 2025-04-21 RX ORDER — PEN NEEDLE, DIABETIC 29 G X1/2"
NEEDLE, DISPOSABLE MISCELLANEOUS
Qty: 100 EACH | Refills: 2 | Status: SHIPPED | OUTPATIENT
Start: 2025-04-21

## 2025-05-03 DIAGNOSIS — E78.5 HYPERLIPIDEMIA LDL GOAL <100: ICD-10-CM

## 2025-05-05 RX ORDER — ATORVASTATIN CALCIUM 20 MG/1
20 TABLET, FILM COATED ORAL DAILY
Qty: 90 TABLET | Refills: 2 | Status: SHIPPED | OUTPATIENT
Start: 2025-05-05

## 2025-05-06 ENCOUNTER — OFFICE VISIT (OUTPATIENT)
Dept: INTERNAL MEDICINE | Facility: CLINIC | Age: 80
End: 2025-05-06
Payer: COMMERCIAL

## 2025-05-06 VITALS
HEART RATE: 67 BPM | SYSTOLIC BLOOD PRESSURE: 127 MMHG | RESPIRATION RATE: 16 BRPM | DIASTOLIC BLOOD PRESSURE: 65 MMHG | BODY MASS INDEX: 21.2 KG/M2 | HEIGHT: 60 IN | WEIGHT: 108 LBS | OXYGEN SATURATION: 95 % | TEMPERATURE: 99 F

## 2025-05-06 DIAGNOSIS — Z78.0 POSTMENOPAUSAL: ICD-10-CM

## 2025-05-06 DIAGNOSIS — R80.9 TYPE 2 DIABETES MELLITUS WITH MICROALBUMINURIA, WITH LONG-TERM CURRENT USE OF INSULIN (H): ICD-10-CM

## 2025-05-06 DIAGNOSIS — Z79.4 TYPE 2 DIABETES MELLITUS WITH MICROALBUMINURIA, WITH LONG-TERM CURRENT USE OF INSULIN (H): ICD-10-CM

## 2025-05-06 DIAGNOSIS — E11.29 TYPE 2 DIABETES MELLITUS WITH MICROALBUMINURIA, WITH LONG-TERM CURRENT USE OF INSULIN (H): ICD-10-CM

## 2025-05-06 DIAGNOSIS — E06.3 AUTOIMMUNE HYPOTHYROIDISM: ICD-10-CM

## 2025-05-06 DIAGNOSIS — F03.B0 MODERATE DEMENTIA WITHOUT BEHAVIORAL DISTURBANCE, PSYCHOTIC DISTURBANCE, MOOD DISTURBANCE, OR ANXIETY, UNSPECIFIED DEMENTIA TYPE (H): ICD-10-CM

## 2025-05-06 DIAGNOSIS — E83.52 HYPERCALCEMIA: ICD-10-CM

## 2025-05-06 DIAGNOSIS — M85.89 OSTEOPENIA OF MULTIPLE SITES: ICD-10-CM

## 2025-05-06 DIAGNOSIS — N18.30 BENIGN HYPERTENSION WITH CKD (CHRONIC KIDNEY DISEASE) STAGE III (H): ICD-10-CM

## 2025-05-06 DIAGNOSIS — E21.3 HYPERPARATHYROIDISM: ICD-10-CM

## 2025-05-06 DIAGNOSIS — Z00.00 ENCOUNTER FOR MEDICARE ANNUAL WELLNESS EXAM: Primary | ICD-10-CM

## 2025-05-06 DIAGNOSIS — I12.9 BENIGN HYPERTENSION WITH CKD (CHRONIC KIDNEY DISEASE) STAGE III (H): ICD-10-CM

## 2025-05-06 LAB
CALCIUM SERPL-MCNC: 11.6 MG/DL (ref 8.8–10.4)
MAGNESIUM SERPL-MCNC: 1.6 MG/DL (ref 1.7–2.3)
PTH-INTACT SERPL-MCNC: 119 PG/ML (ref 15–65)
VIT D+METAB SERPL-MCNC: 26 NG/ML (ref 20–50)

## 2025-05-06 PROCEDURE — T1013 SIGN LANG/ORAL INTERPRETER: HCPCS

## 2025-05-06 RX ORDER — LOSARTAN POTASSIUM 100 MG/1
100 TABLET ORAL DAILY
Qty: 90 TABLET | Refills: 3 | Status: SHIPPED | OUTPATIENT
Start: 2025-05-06

## 2025-05-06 RX ORDER — LEVOTHYROXINE SODIUM 75 UG/1
75 TABLET ORAL DAILY
Qty: 90 TABLET | Refills: 3 | Status: SHIPPED | OUTPATIENT
Start: 2025-05-06

## 2025-05-06 RX ORDER — INSULIN HUMAN 100 [IU]/ML
25 INJECTION, SUSPENSION SUBCUTANEOUS DAILY
Qty: 6 ML | Refills: 3 | Status: SHIPPED | OUTPATIENT
Start: 2025-05-06

## 2025-05-06 SDOH — HEALTH STABILITY: PHYSICAL HEALTH: ON AVERAGE, HOW MANY DAYS PER WEEK DO YOU ENGAGE IN MODERATE TO STRENUOUS EXERCISE (LIKE A BRISK WALK)?: 2 DAYS

## 2025-05-06 SDOH — HEALTH STABILITY: PHYSICAL HEALTH: ON AVERAGE, HOW MANY MINUTES DO YOU ENGAGE IN EXERCISE AT THIS LEVEL?: 10 MIN

## 2025-05-06 ASSESSMENT — SOCIAL DETERMINANTS OF HEALTH (SDOH): HOW OFTEN DO YOU GET TOGETHER WITH FRIENDS OR RELATIVES?: MORE THAN THREE TIMES A WEEK

## 2025-05-06 NOTE — PATIENT INSTRUCTIONS
"Patient Education   L?i Khuyên Ryan Sóc D? Phòng  Ðây là l?i taylor anton tôi thu?ng vida ra d? giúp m?i zofia?i kh?e m?nh. Nhóm ryan sóc c?a quý v? có th? có l?i khuyên c? th? dành riêng cho quý v?. Vui lòng trao d?i v?i nhóm ryan sóc v? jessica c?u ryan sóc d? phòng c?a chính quý v?.  L?i s?ng  T?p th? d?c ít nh?t 150 phút m?i tu?n (30 phút m?i ngày, 5 ngày m?t tu?n).  Th?c hi?n các ho?t d?ng fox cu?ng co 2 ngày m?t tu?n. Ði?u này s? giúp quý v? ki?m soát cân n?ng và vamshi ng?a b?nh t?t.  Không hút thu?c.  Thoa colt ch?ng n?ng d? vamshi ng?a marleny thu da.  Ki?m tra m?c d? radon alivia nhà t? 2 d?n 5 nam m?t l?n. Radon là m?t lo?i khí không màu, không mùi có th? gây h?i cho ph?i c?a quý v?. Ð? tìm hi?u thêm, hãy truy c?p [email or web address]. và tìm ki?m \"Radon in Homes\" (Radon alivia nhà).  Gi? súng không n?p d?n và khóa l?i d? ? amena an toàn jessica két s?t ho?c h?m ch?a súng, ho?c s? d?ng khóa súng và c?t chìa khóa di. Luôn khóa và c?t d?n ? ch? riêng. Ð? tìm hi?u thêm, hãy truy c?p [email or web address] và tìm ki?m \"safe gun storage\" (c?t gi? súng an toàn).  Aric du?ng  An ít nh?t 5 kh?u ph?n trái cây và judson qu? m?i ngày.  Hãy th? bánh mì lúa mì, g?o l?t và mì ?ng nguyên h?t (thay vì bánh mì tr?ng, g?o và mì ?ng).  N?p d? canxi và vitamin D. Ki?m tra nhãn trên th?c ph?m và hu?ng t?i 100% flor RDA (m?c tiêu th? hàng ngày du?c khuy?n ngh?).  Khám d?nh k?  Khám và v? sinh rang mi?ng 6 tháng m?t l?n.  G?p nhóm ryan sóc s?c kh?e c?a quý v? hàng namd? trao d?i v?:  B?t k? thay d?i nào v? s?c kh?e c?a quý v?.  B?t k? lo?i thu?c nào mà nhóm ryan sóc c?a quý v? dã kê don.  Ryan sóc d? phòng, k? ho?ch hóa candi dình và cách vamshi ng?a các b?nh mãn tính.  Tiêm ch?ng (v?c-vitor)   Tiêm phòng HPV (d?n 26 tu?i), n?u quý v? larisa t?ng tiêm lo?i v?c-vitor này balbir?c dó.  Tiêm phòng viêm evin B (d?n 59 tu?i), n?u quý v? larisa t?ng tiêm lo?i v?c-vitor này balbir?c dó.  Tiêm phòng COVID-19: Tiêm v?c-vitor này khi d?n h?n.  Tiêm phòng cúm: Tiêm phòng cúm hàng nam.  Tiêm " phòng u?n ván: Tiêm phòng u?n ván 10 nam m?t l?n.  Tiêm phòng ph? c?u khu?n, viêm evin A và RSV: Hãy h?i nhóm isaiah sóc c?a quý v? xem li?u quý v? có c?n nh?ng brian tiêm này hay không d?a trên nguy co ioana?m b?nh c?a quý v?.  Tiêm phòng Liseth th?n kinh (dành cho tu?i t? 50 tr? lên).  Xét ricarda?m s?c kh?e t?ng quát  Sàng l?c b?nh ti?u du?ng:  B?t d?u t? tu?i 35, Khám sàng l?c b?nh ti?u du?ng ít nh?t 3 nam m?t l?n.  N?u quý v? du?i 35 tu?i, hãy h?i nhóm isaiah sóc xem quý v? có nên sàng l?c b?nh ti?u du?ng hay không.  Xét ricarda?m cholesterol: T? tu?i 39, b?t d?u xét ricarda?m cholesterol 5 nam m?t l?n, ho?c thu?ng xuyên hon n?u du?c khuy?n ngh?.  Ðo m?t d? xuong (DEXA): ? tu?i 50, hãy h?i nhóm isaiah sóc c?a quý v? xem quý v? có nên th?c hi?n xét ricarda?m này d? phát hi?n b?nh loãng xuong (xuong giòn) hay không.  Viêm evin C: Ði xét ricarda?m ít nh?t m?t l?n alivia d?i.  Khám sàng l?c phình d?ng m?ch ch? b?ng: Trao d?i v?i bác si c?a quý v? v? vi?c th?c hi?n sàng l?c này n?u quý v?:  Ðã t?ng hút thu?c; và  Là nam gi?i v? m?t sinh h?c; và  Alivia d? tu?i t? 65 d?n 75.  STI (b?nh ioana?m trùng denice du?ng tình d?c)  Bishnu?c 24 tu?i: Hãy h?i nhóm isaiah sóc c?a quý v? xem quý v? có nên khám sàng l?c STI hay không.  Vita 24 tu?i: Sàng l?c STI n?u quý v? có nguy co m?c b?nh. Quý v? có nguy co m?c các b?nh STI (ny g?m c? HIV) n?u:  Quý v? có alison h? tình d?c tich c?c v?i hon m?t zofia?i.  Quý v? không s? d?ng ny alexander rob m?i lúc.  Quý v? ho?c b?n tình du?c ch?n doán m?c b?nh ioana?m b?nh lây denice du?ng tình d?c.  N?u quý v? có nguy co ioana?m HIV, hãy h?i brigido thu?c PrEP d? vamshi ng?a HIV.  Ði xét ricarda?m HIV ít nh?t m?t l?n alivia d?i, cho dù quý v? có nguy co ioana?m HIV hay không.  Xét ricarda?m sàng l?c marleny thu  Sàng l?c marleny thu c? t? cung: N?u quý v? có c? t? cung, hãy b?t d?u làm xét ricarda?m sàng l?c marleny thu c? t? cung thu?ng xuyên t? tu?i 21. H?u h?t nh?ng zofia?i khám sàng l?c thu?ng xuyên v?i k?t qu? bình thu?ng d?u có th? ng?ng khám vita 65 tu?i. Hãy trao d?i v?  v?n d? này v?i bác si c?a quý v?.  Quét marleny thu vú (ch?p antelmo troy?n vú): N?u quý v? có hay t?ng có vú, hãy b?t d?u ch?p antelmo troy?n vú thu?ng xuyên b?t d?u t? tu?i 40. Ðây là quá trình quét d? ki?m tra marleny thu vú.  Sàng l?c marleny thu d?i tràng: C?n b?t d?u sàng l?c marleny thu d?i tràng t? tu?i 45.  Th?c hi?n xét ricarda?m n?i soi d?i tràng 10 nam m?t l?n (ho?c thu?ng xuyên hon n?u quý v? có nguy co m?c b?nh) Ho?c, hãy h?i nhà cung c?p c?a quý v? v? các xét ricarda?m phân jessica xét ricarda?m FIT hàng nam ho?c xét ricarda?m Cologuard 3 nam m?t l?n.  Ð? tìm hi?u thêm v? các tùy ch?n th? ricarda?m c?a quý v?, hãy truy c?p: [email or web address].  Ð? du?c h? tr? vida ra nydia?t d?nh, hãy truy c?p: [email or web address].  Xét ricarda?m sàng l?c marleny thu troy?n ti?n li?t: N?u quý v? có troy?n ti?n li?t và ? d? tu?i t? 55 d?n 69, hãy h?i bác si xem vi?c xét ricarda?m sàng l?c marleny thu troy?n ti?n li?t hàng nam có dem l?i l?i ích gì cho quý v? hay không.  Sàng l?c marleny thu ph?i: N?u quý v? dã t?ng ho?c còn christie hút thu?c và t? 50 d?n 80 tu?i, hãy h?i nhóm isaiah sóc c?a quý v? xem vi?c sàng l?c marleny thu ph?i thu?ng xuyên có phù h?p v?i quý v? hay không.    Ch? nh?m m?c dích garrett kh?o. Không th? thay th? l?i khuyên c?a nhà cung c?p d?ch v? isaiah sóc s?c kh?e c?a quý v?. B?n nydia?n   2023 [facility].   B?o nickie m?i nydia?n. Ðu?c dánh giá lâm sàng b?i [facility]. [employer] [id number] - REV 04/24.

## 2025-05-06 NOTE — PROGRESS NOTES
Preventive Care Visit  [facility]  Braxton Boone MD, Internal Medicine  May 6, 2025      Assessment & Plan     Encounter for Medicare annual wellness exam  Updated screening, immunizations, prevention.  Please see health maintenance list, care gaps     Type 2 diabetes mellitus with microalbuminuria, with long-term current use of insulin (H)  Try to get better afternoon /evening coverage with increased dose of NPH.   - insulin NPH (HUMULIN N KWIKPEN) 100 UNIT/ML injection; Inject 25 Units subcutaneously daily. Before lunch    Hypercalcemia  Hyperparathyroidism  Osteopenia of multiple sites  -recheck labs.  Pt/family not interested in any surgical approach to hyperparathyroidism.  For now - monitoring Ca+ , other than constipation- no other sx    Postmenopausal  - DEXA HIP/PELVIS/SPINE - Future; Future    Moderate dementia without behavioral disturbance, psychotic disturbance, mood disturbance, or anxiety, unspecified dementia type (H)  Cont meds. Care per . No significant change since last seen.     The longitudinal plan of care for the diagnosis(es)/condition(s) as documented were addressed during this visit. Due to the added complexity in care, I will continue to support maria teresa vera in the subsequent management and with ongoing continuity of care.        Subjective   maria teresa vera is a 80 year old, presenting for the following:  Physical    HPI        Diabetes- significant hyperglycemia in the afternoon and evening. No clear improvement with change to NPH from glargine. No overnight hypoglycemia.       Notes pt more constipated.       Advance Care Planning    Discussed advance care planning with patient; however, patient declined at this time.        5/6/2025   General Health   How would you rate your overall physical health? (!) FAIR   Feel stress (tense, anxious, or unable to sleep) Only a little   (!) STRESS CONCERN      5/6/2025   Nutrition   Diet: Regular (no restrictions)          5/6/2025   Exercise   Days per week of moderate/strenous exercise 2 days   Average minutes spent exercising at this level 10 min   (!) EXERCISE CONCERN      5/6/2025   Social Factors   Frequency of gathering with friends or relatives More than three times a week   Worry food won't last until get money to buy more Patient declined   Food not last or not have enough money for food? Patient declined   Do you have housing? (Housing is defined as stable permanent housing and does not include staying outside in a car, in a tent, in an abandoned building, in an overnight shelter, or couch-surfing.) Patient declined   Are you worried about losing your housing? Patient declined   Lack of transportation? Patient declined   Unable to get utilities (heat,electricity)? Patient declined         5/6/2025   Fall Risk   Fallen 2 or more times in the past year? No    Trouble with walking or balance? No        Proxy-reported          5/6/2025   Activities of Daily Living- Home Safety   Needs help with the following daily activites None of the above   Safety concerns in the home None of the above         5/6/2025   Dental   Dentist two times every year? (!) NO         5/6/2025   Hearing Screening   Hearing concerns? None of the above         5/6/2025   Driving Risk Screening   Patient/family members have concerns about driving No         5/6/2025   General Alertness/Fatigue Screening   Have you been more tired than usual lately? No         5/6/2025   Urinary Incontinence Screening   Bothered by leaking urine in past 6 months No         Today's PHQ-2 Score:       5/6/2025     2:25 PM   PHQ-2 ( 1999 [employer])   Q1: Little interest or pleasure in doing things 0    Q2: Feeling down, depressed or hopeless 0    PHQ-2 Score 0    Q1: Little interest or pleasure in doing things Not at all   Q2: Feeling down, depressed or hopeless Not at all   PHQ-2 Score 0       Proxy-reported           5/6/2025   Substance Use   Alcohol more than 3/day or more  than 7/wk No   Do you have a current opioid prescription? No   How severe/bad is pain from 1 to 10? 0/10 (No Pain)   Do you use any other substances recreationally? No     Social History     Tobacco Use    Smoking status: Never    Smokeless tobacco: Never   Vaping Use    Vaping status: Never Used   Substance Use Topics    Alcohol use: No     Alcohol/week: 0.0 standard drinks of alcohol    Drug use: No          Mammogram Screening - After age 74- determine frequency with patient based on health status, life expectancy and patient goals              Reviewed and updated as needed this visit by Provider                      Current providers sharing in care for this patient include:  Patient Care Team:  Braxton Boone MD as PCP - General  Braxton Boone MD as Assigned PCP  Caitlin Amin RD as Diabetes Educator (Dietitian, Registered)  Caitlin Amin RD as Diabetes Educator (Dietitian, Registered)  No Ref-Primary, Physician  Maddi Marie formerly [facility] as Pharmacist (Pharmacist)  Santosh Voss MD as Assigned Heart and Vascular Provider    The following health maintenance items are reviewed in Epic and correct as of today:  Health Maintenance   Topic Date Due    DTAP/TDAP/TD IMMUNIZATION (1 - Tdap) 02/28/2012    ZOSTER IMMUNIZATION (2 of 3) 12/14/2017    COVID-19 Vaccine (8 - 2024-25 season) 09/01/2024    DIABETIC FOOT EXAM  04/25/2025    DEXA  05/09/2025    EYE EXAM  09/06/2025    MICROALBUMIN  09/09/2025    A1C  09/11/2025    HEMOGLOBIN  12/26/2025    BMP  03/11/2026    LIPID  03/11/2026    TSH W/FREE T4 REFLEX  03/11/2026    ANNUAL REVIEW OF HM ORDERS  03/11/2026    MEDICARE ANNUAL WELLNESS VISIT  05/06/2026    FALL RISK ASSESSMENT  05/06/2026    ADVANCE CARE PLANNING  05/06/2030    PHQ-2 (once per calendar year)  Completed    INFLUENZA VACCINE  Completed    Pneumococcal Vaccine: 50+ Years  Completed    URINALYSIS  Completed    RSV VACCINE  Completed    HPV IMMUNIZATION  Aged Out    MENINGITIS IMMUNIZATION   Aged Out    MAMMO SCREENING  Discontinued    COLORECTAL CANCER SCREENING  Discontinued            Objective    Exam  /65   Pulse 67   Temp 99  F (37.2  C) (Temporal)   Resp 16   Ht 1.524 m (5')   Wt 49 kg (108 lb)   LMP  (LMP Unknown)   SpO2 95%   BMI 21.09 kg/m     Estimated body mass index is 21.09 kg/m  as calculated from the following:    Height as of this encounter: 1.524 m (5').    Weight as of this encounter: 49 kg (108 lb).    Physical Exam  GENERAL: alert and no distress  HENT: normal cephalic/atraumatic  NECK: no adenopathy, no asymmetry, masses, or scars  RESP: lungs clear to auscultation - no rales, rhonchi or wheezes  CV: regular rate and rhythm, normal S1 S2, no S3 or S4, no murmur, click or rub, no peripheral edema   MS: no gross musculoskeletal defects noted, no edema  SKIN: no suspicious lesions or rashes  LYMPH: no cervical adenopathy  Diabetic foot exam: normal DP and PT pulses, no trophic changes or ulcerative lesions, normal sensory exam, and normal monofilament exam        5/6/2025   Mini Cog   Mini-Cog Not Completed (choose reason) Known dementia              Signed Electronically by: Braxton Boone MD

## 2025-05-07 ENCOUNTER — PATIENT OUTREACH (OUTPATIENT)
Dept: CARE COORDINATION | Facility: CLINIC | Age: 80
End: 2025-05-07
Payer: COMMERCIAL

## 2025-05-22 ENCOUNTER — APPOINTMENT (OUTPATIENT)
Dept: CT IMAGING | Facility: CLINIC | Age: 80
DRG: 690 | End: 2025-05-22
Attending: EMERGENCY MEDICINE
Payer: COMMERCIAL

## 2025-05-22 ENCOUNTER — HOSPITAL ENCOUNTER (INPATIENT)
Facility: CLINIC | Age: 80
DRG: 690 | End: 2025-05-22
Attending: EMERGENCY MEDICINE | Admitting: HOSPITALIST
Payer: COMMERCIAL

## 2025-05-22 VITALS
SYSTOLIC BLOOD PRESSURE: 143 MMHG | OXYGEN SATURATION: 97 % | RESPIRATION RATE: 18 BRPM | HEART RATE: 63 BPM | DIASTOLIC BLOOD PRESSURE: 61 MMHG | TEMPERATURE: 98.1 F

## 2025-05-22 DIAGNOSIS — D72.829 LEUKOCYTOSIS, UNSPECIFIED TYPE: ICD-10-CM

## 2025-05-22 DIAGNOSIS — R53.1 GENERALIZED WEAKNESS: Primary | ICD-10-CM

## 2025-05-22 DIAGNOSIS — E87.20 LACTIC ACIDOSIS: ICD-10-CM

## 2025-05-22 DIAGNOSIS — R41.82 ALTERED MENTAL STATUS, UNSPECIFIED ALTERED MENTAL STATUS TYPE: ICD-10-CM

## 2025-05-22 DIAGNOSIS — N39.0 URINARY TRACT INFECTION WITHOUT HEMATURIA, SITE UNSPECIFIED: ICD-10-CM

## 2025-05-22 PROBLEM — M62.81 GENERALIZED MUSCLE WEAKNESS: Status: ACTIVE | Noted: 2025-05-22

## 2025-05-22 LAB
ALBUMIN SERPL BCG-MCNC: 3.5 G/DL (ref 3.5–5.2)
ALBUMIN UR-MCNC: NEGATIVE MG/DL
ALP SERPL-CCNC: 132 U/L (ref 40–150)
ALT SERPL W P-5'-P-CCNC: 40 U/L (ref 0–50)
ANION GAP SERPL CALCULATED.3IONS-SCNC: 13 MMOL/L (ref 7–15)
APPEARANCE UR: CLEAR
AST SERPL W P-5'-P-CCNC: 38 U/L (ref 0–45)
B-OH-BUTYR SERPL-SCNC: <0.18 MMOL/L
BASOPHILS # BLD AUTO: 0 10E3/UL (ref 0–0.2)
BASOPHILS NFR BLD AUTO: 0 %
BILIRUB SERPL-MCNC: 0.5 MG/DL
BILIRUB UR QL STRIP: NEGATIVE
BUN SERPL-MCNC: 29.6 MG/DL (ref 8–23)
CALCIUM SERPL-MCNC: 11.5 MG/DL (ref 8.8–10.4)
CHLORIDE SERPL-SCNC: 101 MMOL/L (ref 98–107)
COLOR UR AUTO: ABNORMAL
CREAT SERPL-MCNC: 1.37 MG/DL (ref 0.51–0.95)
EGFRCR SERPLBLD CKD-EPI 2021: 39 ML/MIN/1.73M2
EOSINOPHIL # BLD AUTO: 0.1 10E3/UL (ref 0–0.7)
EOSINOPHIL NFR BLD AUTO: 1 %
ERYTHROCYTE [DISTWIDTH] IN BLOOD BY AUTOMATED COUNT: 13.1 % (ref 10–15)
FLUAV RNA SPEC QL NAA+PROBE: NEGATIVE
FLUBV RNA RESP QL NAA+PROBE: NEGATIVE
GLUCOSE BLDC GLUCOMTR-MCNC: 204 MG/DL (ref 70–99)
GLUCOSE SERPL-MCNC: 274 MG/DL (ref 70–99)
GLUCOSE UR STRIP-MCNC: 100 MG/DL
HCO3 SERPL-SCNC: 20 MMOL/L (ref 22–29)
HCT VFR BLD AUTO: 36.4 % (ref 35–47)
HGB BLD-MCNC: 12.3 G/DL (ref 11.7–15.7)
HGB UR QL STRIP: NEGATIVE
HOLD SPECIMEN: NORMAL
IMM GRANULOCYTES # BLD: 0.2 10E3/UL
IMM GRANULOCYTES NFR BLD: 1 %
KETONES UR STRIP-MCNC: NEGATIVE MG/DL
LACTATE SERPL-SCNC: 3.6 MMOL/L (ref 0.7–2)
LACTATE SERPL-SCNC: 3.8 MMOL/L (ref 0.7–2)
LEUKOCYTE ESTERASE UR QL STRIP: NEGATIVE
LYMPHOCYTES # BLD AUTO: 3 10E3/UL (ref 0.8–5.3)
LYMPHOCYTES NFR BLD AUTO: 19 %
MAGNESIUM SERPL-MCNC: 1.9 MG/DL (ref 1.7–2.3)
MCH RBC QN AUTO: 30 PG (ref 26.5–33)
MCHC RBC AUTO-ENTMCNC: 33.8 G/DL (ref 31.5–36.5)
MCV RBC AUTO: 89 FL (ref 78–100)
MONOCYTES # BLD AUTO: 0.8 10E3/UL (ref 0–1.3)
MONOCYTES NFR BLD AUTO: 5 %
MUCOUS THREADS #/AREA URNS LPF: PRESENT /LPF
NEUTROPHILS # BLD AUTO: 11.8 10E3/UL (ref 1.6–8.3)
NEUTROPHILS NFR BLD AUTO: 74 %
NITRATE UR QL: NEGATIVE
NRBC # BLD AUTO: 0 10E3/UL
NRBC BLD AUTO-RTO: 0 /100
PH UR STRIP: 5.5 [PH] (ref 5–7)
PLATELET # BLD AUTO: 268 10E3/UL (ref 150–450)
POTASSIUM SERPL-SCNC: 4.4 MMOL/L (ref 3.4–5.3)
PROT SERPL-MCNC: 7.6 G/DL (ref 6.4–8.3)
RBC # BLD AUTO: 4.1 10E6/UL (ref 3.8–5.2)
RBC URINE: <1 /HPF
RSV RNA SPEC NAA+PROBE: NEGATIVE
SARS-COV-2 RNA RESP QL NAA+PROBE: NEGATIVE
SODIUM SERPL-SCNC: 134 MMOL/L (ref 135–145)
SP GR UR STRIP: 1.02 (ref 1–1.03)
T4 FREE SERPL-MCNC: 1.36 NG/DL (ref 0.9–1.7)
TSH SERPL DL<=0.005 MIU/L-ACNC: 6.47 UIU/ML (ref 0.3–4.2)
UROBILINOGEN UR STRIP-MCNC: NORMAL MG/DL
WBC # BLD AUTO: 16 10E3/UL (ref 4–11)
WBC URINE: 1 /HPF

## 2025-05-22 PROCEDURE — 250N000011 HC RX IP 250 OP 636: Performed by: EMERGENCY MEDICINE

## 2025-05-22 PROCEDURE — 70450 CT HEAD/BRAIN W/O DYE: CPT

## 2025-05-22 PROCEDURE — 83735 ASSAY OF MAGNESIUM: CPT | Performed by: EMERGENCY MEDICINE

## 2025-05-22 PROCEDURE — 84439 ASSAY OF FREE THYROXINE: CPT | Performed by: HOSPITALIST

## 2025-05-22 PROCEDURE — 120N000001 HC R&B MED SURG/OB

## 2025-05-22 PROCEDURE — 84155 ASSAY OF PROTEIN SERUM: CPT | Performed by: EMERGENCY MEDICINE

## 2025-05-22 PROCEDURE — 87637 SARSCOV2&INF A&B&RSV AMP PRB: CPT | Performed by: EMERGENCY MEDICINE

## 2025-05-22 PROCEDURE — 84443 ASSAY THYROID STIM HORMONE: CPT | Performed by: HOSPITALIST

## 2025-05-22 PROCEDURE — 250N000009 HC RX 250: Performed by: EMERGENCY MEDICINE

## 2025-05-22 PROCEDURE — 83605 ASSAY OF LACTIC ACID: CPT | Performed by: EMERGENCY MEDICINE

## 2025-05-22 PROCEDURE — 85014 HEMATOCRIT: CPT | Performed by: EMERGENCY MEDICINE

## 2025-05-22 PROCEDURE — 99285 EMERGENCY DEPT VISIT HI MDM: CPT | Mod: 25

## 2025-05-22 PROCEDURE — 96365 THER/PROPH/DIAG IV INF INIT: CPT | Mod: 59

## 2025-05-22 PROCEDURE — 258N000003 HC RX IP 258 OP 636: Performed by: EMERGENCY MEDICINE

## 2025-05-22 PROCEDURE — 258N000003 HC RX IP 258 OP 636: Performed by: HOSPITALIST

## 2025-05-22 PROCEDURE — 81001 URINALYSIS AUTO W/SCOPE: CPT | Performed by: EMERGENCY MEDICINE

## 2025-05-22 PROCEDURE — 36415 COLL VENOUS BLD VENIPUNCTURE: CPT | Performed by: EMERGENCY MEDICINE

## 2025-05-22 PROCEDURE — 96366 THER/PROPH/DIAG IV INF ADDON: CPT

## 2025-05-22 PROCEDURE — 250N000011 HC RX IP 250 OP 636: Performed by: HOSPITALIST

## 2025-05-22 PROCEDURE — 96361 HYDRATE IV INFUSION ADD-ON: CPT

## 2025-05-22 PROCEDURE — 87040 BLOOD CULTURE FOR BACTERIA: CPT | Performed by: EMERGENCY MEDICINE

## 2025-05-22 PROCEDURE — 71275 CT ANGIOGRAPHY CHEST: CPT

## 2025-05-22 PROCEDURE — 82010 KETONE BODYS QUAN: CPT | Performed by: EMERGENCY MEDICINE

## 2025-05-22 PROCEDURE — 99222 1ST HOSP IP/OBS MODERATE 55: CPT | Performed by: HOSPITALIST

## 2025-05-22 RX ORDER — CALCIUM CARBONATE 500 MG/1
1000 TABLET, CHEWABLE ORAL 4 TIMES DAILY PRN
Status: DISCONTINUED | OUTPATIENT
Start: 2025-05-22 | End: 2025-05-24 | Stop reason: HOSPADM

## 2025-05-22 RX ORDER — HYDRALAZINE HYDROCHLORIDE 20 MG/ML
10 INJECTION INTRAMUSCULAR; INTRAVENOUS EVERY 4 HOURS PRN
Status: DISCONTINUED | OUTPATIENT
Start: 2025-05-22 | End: 2025-05-24 | Stop reason: HOSPADM

## 2025-05-22 RX ORDER — ONDANSETRON 2 MG/ML
4 INJECTION INTRAMUSCULAR; INTRAVENOUS EVERY 6 HOURS PRN
Status: DISCONTINUED | OUTPATIENT
Start: 2025-05-22 | End: 2025-05-24 | Stop reason: HOSPADM

## 2025-05-22 RX ORDER — SODIUM CHLORIDE 9 MG/ML
INJECTION, SOLUTION INTRAVENOUS CONTINUOUS
Status: ACTIVE | OUTPATIENT
Start: 2025-05-22 | End: 2025-05-23

## 2025-05-22 RX ORDER — IOPAMIDOL 755 MG/ML
54 INJECTION, SOLUTION INTRAVASCULAR ONCE
Status: COMPLETED | OUTPATIENT
Start: 2025-05-22 | End: 2025-05-22

## 2025-05-22 RX ORDER — PIPERACILLIN SODIUM, TAZOBACTAM SODIUM 4; .5 G/20ML; G/20ML
4.5 INJECTION, POWDER, LYOPHILIZED, FOR SOLUTION INTRAVENOUS ONCE
Status: COMPLETED | OUTPATIENT
Start: 2025-05-22 | End: 2025-05-22

## 2025-05-22 RX ORDER — ACETAMINOPHEN 650 MG/1
650 SUPPOSITORY RECTAL EVERY 4 HOURS PRN
Status: DISCONTINUED | OUTPATIENT
Start: 2025-05-22 | End: 2025-05-24 | Stop reason: HOSPADM

## 2025-05-22 RX ORDER — ONDANSETRON 4 MG/1
4 TABLET, ORALLY DISINTEGRATING ORAL EVERY 6 HOURS PRN
Status: DISCONTINUED | OUTPATIENT
Start: 2025-05-22 | End: 2025-05-24 | Stop reason: HOSPADM

## 2025-05-22 RX ORDER — ACETAMINOPHEN 325 MG/1
650 TABLET ORAL EVERY 4 HOURS PRN
Status: DISCONTINUED | OUTPATIENT
Start: 2025-05-22 | End: 2025-05-24 | Stop reason: HOSPADM

## 2025-05-22 RX ORDER — DEXTROSE MONOHYDRATE 25 G/50ML
25-50 INJECTION, SOLUTION INTRAVENOUS
Status: DISCONTINUED | OUTPATIENT
Start: 2025-05-22 | End: 2025-05-24 | Stop reason: HOSPADM

## 2025-05-22 RX ORDER — PIPERACILLIN SODIUM, TAZOBACTAM SODIUM 3; .375 G/15ML; G/15ML
3.38 INJECTION, POWDER, LYOPHILIZED, FOR SOLUTION INTRAVENOUS EVERY 6 HOURS
Status: DISCONTINUED | OUTPATIENT
Start: 2025-05-22 | End: 2025-05-24 | Stop reason: HOSPADM

## 2025-05-22 RX ORDER — LIDOCAINE 40 MG/G
CREAM TOPICAL
Status: DISCONTINUED | OUTPATIENT
Start: 2025-05-22 | End: 2025-05-24 | Stop reason: HOSPADM

## 2025-05-22 RX ORDER — NICOTINE POLACRILEX 4 MG
15-30 LOZENGE BUCCAL
Status: DISCONTINUED | OUTPATIENT
Start: 2025-05-22 | End: 2025-05-24 | Stop reason: HOSPADM

## 2025-05-22 RX ADMIN — SODIUM CHLORIDE 1000 ML: 0.9 INJECTION, SOLUTION INTRAVENOUS at 19:43

## 2025-05-22 RX ADMIN — IOPAMIDOL 54 ML: 755 INJECTION, SOLUTION INTRAVENOUS at 18:47

## 2025-05-22 RX ADMIN — SODIUM CHLORIDE 500 ML: 0.9 INJECTION, SOLUTION INTRAVENOUS at 15:05

## 2025-05-22 RX ADMIN — SODIUM CHLORIDE: 9 INJECTION, SOLUTION INTRAVENOUS at 22:51

## 2025-05-22 RX ADMIN — PIPERACILLIN AND TAZOBACTAM 4.5 G: 4; .5 INJECTION, POWDER, FOR SOLUTION INTRAVENOUS at 16:47

## 2025-05-22 RX ADMIN — SODIUM CHLORIDE 81 ML: 9 INJECTION, SOLUTION INTRAVENOUS at 18:47

## 2025-05-22 RX ADMIN — PIPERACILLIN AND TAZOBACTAM 3.38 G: 3; .375 INJECTION, POWDER, FOR SOLUTION INTRAVENOUS at 22:51

## 2025-05-22 ASSESSMENT — ACTIVITIES OF DAILY LIVING (ADL)
ADLS_ACUITY_SCORE: 41

## 2025-05-22 NOTE — ED NOTES
Bed: ED15  Expected date:   Expected time:   Means of arrival:   Comments:  520 80F increased weakness and confusion. Yoruba

## 2025-05-22 NOTE — ED TRIAGE NOTES
EMS called for altered mental status, generalized weakness. Speaks Estonian.  Today had diarrhea. Family on the way.  Diabetic, glucose 220. 20 g RAC. VSS, bradycardia is baseline per EMS.

## 2025-05-22 NOTE — ED PROVIDER NOTES
Emergency Department Note      History of Present Illness     Chief Complaint   Altered Mental Status      HPI - obtained using Professional Arabic speaking .   Gregoria Gan is a 80 year old female with a history of chronic kidney disease, hypertension. Hyperlipidemia, type 2 diabetes mellitus who was brought in by EMS for evaluation of a diarrhea and generalized weakness. She denies any pains.      History limited due to hearing difficulty, dementia and language barrier.    Later we spoke to her family at bedside who reported that she has dementia from Alzheimer's. Gregoria Coombs called her family a she was having difficulty getting up from a toilet seat. She had diarrhea today as per family which is not normal for her as she is chronically constipated. There was no blood in stool. Her family gave her some sugar water as she has not been eating well and was feeling lethargic today. Her blood sugar was 90 when they checked after the sugar water. Gregoria Coomsb has been experiencing cough since past 3/4 days and family reports that cough and cold has been running in the family currently. She did not have any recent travel. No fevers or vomiting.     Independent Historian   EMS as detailed above.    Review of External Notes   I reviewed the progress note to her internal medicine physician on May 6, 2025.  History of diabetes, hypercalcemia, hyperparathyroidism and osteopenia.  Has a history of moderate dementia.    Past Medical History     Medical History and Problem List   CKD   HTN   Hyperlipidemia  Type 2 diabetes  Hypothyroidism     Medications   Amlodipine    Atorvastatin   Glipizide    insulin glargine   Levothyroxine    Losartan   Memantine    Metformin    Trazodone      Surgical History   Salivary gland surgery   Breast surgery     Physical Exam     Patient Vitals for the past 24 hrs:   BP Temp Temp src Pulse Resp SpO2   05/22/25 1800 122/61 -- -- 58 -- 95 %   05/22/25 1700 131/60 -- -- 61 -- 94 %    05/22/25 1600 128/58 -- -- 59 -- 94 %   05/22/25 1500 139/63 -- -- 60 -- 97 %   05/22/25 1440 (!) 141/58 -- -- 57 -- 98 %   05/22/25 1433 -- 97.7  F (36.5  C) Oral -- -- --   05/22/25 1431 137/58 -- -- 58 16 98 %     Physical Exam  General: Well-nourished  Eyes: PERRL, conjunctivae pink no scleral icterus or conjunctival injection  ENT:  Moist mucus membranes, posterior oropharynx clear without erythema or exudates  Respiratory: Bibasilar crackles.  No wheezes.  CV: Normal rate and rhythm, no murmurs/rubs/gallops  GI:  Abdomen soft and non-distended.  Normoactive BS.  No tenderness, guarding or rebound  Skin: Warm, dry.  No rashes or petechiae  Musculoskeletal: No peripheral edema or calf tenderness  Neuro: Alert.  Unable to cooperate with examination due to dementia and language barrier.  Seems to have no focal neurologic deficits of the cranial nerves and is moving all extremities.    Psychiatric: Unable to assess      Diagnostics     Lab Results   Labs Ordered and Resulted from Time of ED Arrival to Time of ED Departure   COMPREHENSIVE METABOLIC PANEL - Abnormal       Result Value    Sodium 134 (*)     Potassium 4.4      Carbon Dioxide (CO2) 20 (*)     Anion Gap 13      Urea Nitrogen 29.6 (*)     Creatinine 1.37 (*)     GFR Estimate 39 (*)     Calcium 11.5 (*)     Chloride 101      Glucose 274 (*)     Alkaline Phosphatase 132      AST 38      ALT 40      Protein Total 7.6      Albumin 3.5      Bilirubin Total 0.5     CBC WITH PLATELETS AND DIFFERENTIAL - Abnormal    WBC Count 16.0 (*)     RBC Count 4.10      Hemoglobin 12.3      Hematocrit 36.4      MCV 89      MCH 30.0      MCHC 33.8      RDW 13.1      Platelet Count 268      % Neutrophils 74      % Lymphocytes 19      % Monocytes 5      % Eosinophils 1      % Basophils 0      % Immature Granulocytes 1      NRBCs per 100 WBC 0      Absolute Neutrophils 11.8 (*)     Absolute Lymphocytes 3.0      Absolute Monocytes 0.8      Absolute Eosinophils 0.1      Absolute  Basophils 0.0      Absolute Immature Granulocytes 0.2      Absolute NRBCs 0.0     LACTIC ACID WHOLE BLOOD WITH 1X REPEAT IN 2 HR WHEN >2 - Abnormal    Lactic Acid, Initial 3.8 (*)    ROUTINE UA WITH MICROSCOPIC REFLEX TO CULTURE - Abnormal    Color Urine Light Yellow      Appearance Urine Clear      Glucose Urine 100 (*)     Bilirubin Urine Negative      Ketones Urine Negative      Specific Gravity Urine 1.022      Blood Urine Negative      pH Urine 5.5      Protein Albumin Urine Negative      Urobilinogen Urine Normal      Nitrite Urine Negative      Leukocyte Esterase Urine Negative      Mucus Urine Present (*)     RBC Urine <1      WBC Urine 1     LACTIC ACID WHOLE BLOOD - Abnormal    Lactic Acid 3.6 (*)    MAGNESIUM - Normal    Magnesium 1.9     KETONE BETA-HYDROXYBUTYRATE QUANTITATIVE, RAPID - Normal    Ketone (Beta-Hydroxybutyrate) Quantitative <0.18     INFLUENZA A/B, RSV AND SARS-COV2 PCR - Normal    Influenza A PCR Negative      Influenza B PCR Negative      RSV PCR Negative      SARS CoV2 PCR Negative     IONIZED CALCIUM   BLOOD CULTURE   BLOOD CULTURE       Imaging   CT Chest Pulmonary Embolism w Contrast   Final Result   IMPRESSION:   1.  No PE.   2.  Mild lower lung predominant bronchiectasis. Reticular interstitial opacities in the lower are again seen and likely represent fibrosis.   3.  Pulmonary nodules have not changed.    4.  Soft tissue and calcifications in the anterior chest wall have not changed.         Head CT w/o contrast    (Results Pending)       Independent Interpretation   None    ED Course      Medications Administered   Medications   sodium chloride (PF) 0.9% PF flush 3 mL (has no administration in time range)   sodium chloride (PF) 0.9% PF flush 3 mL (3 mLs Intracatheter $Given 5/22/25 1647)   sodium chloride 0.9% BOLUS 500 mL (0 mLs Intravenous Stopped 5/22/25 1536)   piperacillin-tazobactam (ZOSYN) 4.5 g vial to attach to  mL bag (0 g Intravenous Stopped 5/22/25 6131)    iopamidol (ISOVUE-370) solution 54 mL (54 mLs Intravenous $Given 5/22/25 1847)   sodium chloride 0.9 % bag for CT scan flush (81 mLs Intravenous $Given 5/22/25 1847)   sodium chloride 0.9% BOLUS 1,000 mL (1,000 mLs Intravenous $New Bag 5/22/25 1943)       Procedures   Procedures     Discussion of Management   Discussed with Dr. Ryan    ED Course   ED Course as of 05/22/25 2100   u May 22, 2025   1429 I obtained the history and examined the patient as above.    1943 I consulted with Dr. Ryan regarding admission       Additional Documentation  None    Medical Decision Making / Diagnosis     Sepsis ED evaluation   The patient has signs of sepsis as evidenced by:  1. Presence of 2 SIRS criteria, suspected infection, AND  2. Organ dysfunction: Sepsis work up in progress. Will continue to monitor for signs of organ dysfunction    Sepsis Care Initiation: Starting at 1555 PM on 05/22/25, until specified. Prior to this documentation, sepsis, severe sepsis, or septic shock was NOT thought to be a significant cause of illness. This order represents the first time infection was seriously considered to be affecting the patient.    Lactic Acid Results:  Recent Labs   Lab Test 05/22/25  1827 05/22/25  1537   LACT 3.6* 3.8*       3 Hour Bundle 6 Hour Bundle (Reassessment)   Blood Cultures before IV Antibiotics: Yes  Antibiotics given: see below  Prehospital fluid volume (mL):                     Total fluids given (ED +Pre-hospital):  Full 30 mL/kg bolus intentionally NOT administered to this patient due to concern for hypervolemia. The target volume to infuse in this patient is 500.   Repeat Lactic Acid Level: Ordered by reflex for 2 hours after initial lactic acid collection.  Vasopressors: MAP>65 after initial IVF bolus, will continue to monitor fluid status and vital signs.  Repeat perfusion exam: I attest to having performed a repeat sepsis exam and assessment of perfusion at 9:00 PM .   BMI Readings from Last 1  Encounters:   05/06/25 21.09 kg/m        Anti-infectives (From admission through now)      Start     Dose/Rate Route Frequency Ordered Stop    05/22/25 1600  piperacillin-tazobactam (ZOSYN) 4.5 g vial to attach to  mL bag         4.5 g  over 30 Minutes Intravenous ONCE 05/22/25 1555 05/22/25 1822                MIPS   CT for PE was ordered because the patient is high risk for pulmonary embolism.     FAZAL Gan is a 80 year old female with a history of dementia, hypercalcemia and diabetes who comes today with generalized weakness.  She is unfortunately unable to provide significant history and symptoms are nonspecific.  She is found to be hypercalcemic again today.  Renal function is at baseline.  White blood cell count is severely elevated and she has been coughing and so lactic acid and septic workup was undertaken.  Lactic acid was elevated.  She was treated with IV fluids as well as with broad-spectrum antibiotics after cultures were obtained.  CT chest was obtained and shows no obvious pulmonary embolism.  She had no obvious pneumonia but findings of bronchiectasis so this may be related to her leukocytosis and cough.  Viral testing for COVID, influenza and RSV was negative.  Urinalysis is bland and does not seem to be the source.  No obvious source is found on examination.  She does not appear to be in DKA.  Etiology for lactic acidosis is not completely clear but she was given additional fluids after there were no signs of fluid overload on CT scan and it appeared that the bibasilar crackles were due to pulmonary fibrosis rather than fluid overload.  Head CT is pending at this time to be sure no signs of intracranial hemorrhage.  She will be admitted to the hospital for ongoing treatment with parenteral antibiotics, further evaluation and monitoring.  Jerry Ryan MD graciously agreed to meet the patient.  Family was and agree with the plan as well.    Disposition   The patient was admitted  to the hospital.     Diagnosis     ICD-10-CM    1. Altered mental status, unspecified altered mental status type  R41.82       2. Leukocytosis, unspecified type  D72.829       3. Lactic acidosis  E87.20            Scribe Disclosure:  MAURILIO Maruthy Mckinley, am serving as a scribe at 2:24 PM on 5/22/2025 to document services personally performed by Louise Delgado MD based on my observations and the provider's statements to me.        Louise Delgado MD  05/22/25 8729

## 2025-05-23 ENCOUNTER — APPOINTMENT (OUTPATIENT)
Dept: PHYSICAL THERAPY | Facility: CLINIC | Age: 80
DRG: 690 | End: 2025-05-23
Attending: HOSPITALIST
Payer: COMMERCIAL

## 2025-05-23 LAB
ANION GAP SERPL CALCULATED.3IONS-SCNC: 12 MMOL/L (ref 7–15)
BASOPHILS # BLD AUTO: 0 10E3/UL (ref 0–0.2)
BASOPHILS NFR BLD AUTO: 0 %
BUN SERPL-MCNC: 19.9 MG/DL (ref 8–23)
CA-I BLD-MCNC: 5.3 MG/DL (ref 4.4–5.2)
CALCIUM SERPL-MCNC: 10.3 MG/DL (ref 8.8–10.4)
CHLORIDE SERPL-SCNC: 107 MMOL/L (ref 98–107)
CREAT SERPL-MCNC: 1.15 MG/DL (ref 0.51–0.95)
EGFRCR SERPLBLD CKD-EPI 2021: 48 ML/MIN/1.73M2
EOSINOPHIL # BLD AUTO: 0.1 10E3/UL (ref 0–0.7)
EOSINOPHIL NFR BLD AUTO: 1 %
ERYTHROCYTE [DISTWIDTH] IN BLOOD BY AUTOMATED COUNT: 13 % (ref 10–15)
GLUCOSE BLDC GLUCOMTR-MCNC: 161 MG/DL (ref 70–99)
GLUCOSE BLDC GLUCOMTR-MCNC: 220 MG/DL (ref 70–99)
GLUCOSE BLDC GLUCOMTR-MCNC: 262 MG/DL (ref 70–99)
GLUCOSE BLDC GLUCOMTR-MCNC: 305 MG/DL (ref 70–99)
GLUCOSE SERPL-MCNC: 163 MG/DL (ref 70–99)
HCO3 SERPL-SCNC: 21 MMOL/L (ref 22–29)
HCT VFR BLD AUTO: 38.3 % (ref 35–47)
HGB BLD-MCNC: 11.5 G/DL (ref 11.7–15.7)
HGB BLD-MCNC: 11.9 G/DL (ref 11.7–15.7)
HGB BLD-MCNC: 13.1 G/DL (ref 11.7–15.7)
IMM GRANULOCYTES # BLD: 0.1 10E3/UL
IMM GRANULOCYTES NFR BLD: 1 %
LACTATE SERPL-SCNC: 2.3 MMOL/L (ref 0.7–2)
LYMPHOCYTES # BLD AUTO: 2.1 10E3/UL (ref 0.8–5.3)
LYMPHOCYTES NFR BLD AUTO: 15 %
MCH RBC QN AUTO: 30.3 PG (ref 26.5–33)
MCHC RBC AUTO-ENTMCNC: 34.2 G/DL (ref 31.5–36.5)
MCV RBC AUTO: 88 FL (ref 78–100)
MCV RBC AUTO: 89 FL (ref 78–100)
MCV RBC AUTO: 89 FL (ref 78–100)
MONOCYTES # BLD AUTO: 0.9 10E3/UL (ref 0–1.3)
MONOCYTES NFR BLD AUTO: 7 %
NEUTROPHILS # BLD AUTO: 10.9 10E3/UL (ref 1.6–8.3)
NEUTROPHILS NFR BLD AUTO: 77 %
NRBC # BLD AUTO: 0 10E3/UL
NRBC BLD AUTO-RTO: 0 /100
PLATELET # BLD AUTO: 257 10E3/UL (ref 150–450)
POTASSIUM SERPL-SCNC: 4.1 MMOL/L (ref 3.4–5.3)
RBC # BLD AUTO: 4.32 10E6/UL (ref 3.8–5.2)
SODIUM SERPL-SCNC: 140 MMOL/L (ref 135–145)
WBC # BLD AUTO: 14 10E3/UL (ref 4–11)

## 2025-05-23 PROCEDURE — 85018 HEMOGLOBIN: CPT | Performed by: HOSPITALIST

## 2025-05-23 PROCEDURE — 250N000013 HC RX MED GY IP 250 OP 250 PS 637: Performed by: HOSPITALIST

## 2025-05-23 PROCEDURE — 250N000011 HC RX IP 250 OP 636: Performed by: HOSPITALIST

## 2025-05-23 PROCEDURE — 83605 ASSAY OF LACTIC ACID: CPT | Performed by: HOSPITALIST

## 2025-05-23 PROCEDURE — 120N000001 HC R&B MED SURG/OB

## 2025-05-23 PROCEDURE — 97161 PT EVAL LOW COMPLEX 20 MIN: CPT | Mod: GP | Performed by: PHYSICAL THERAPIST

## 2025-05-23 PROCEDURE — 99232 SBSQ HOSP IP/OBS MODERATE 35: CPT | Performed by: HOSPITALIST

## 2025-05-23 PROCEDURE — 258N000003 HC RX IP 258 OP 636: Performed by: HOSPITALIST

## 2025-05-23 PROCEDURE — 97530 THERAPEUTIC ACTIVITIES: CPT | Mod: GP | Performed by: PHYSICAL THERAPIST

## 2025-05-23 PROCEDURE — 36415 COLL VENOUS BLD VENIPUNCTURE: CPT | Performed by: HOSPITALIST

## 2025-05-23 PROCEDURE — 999N000111 HC STATISTIC OT IP EVAL DEFER

## 2025-05-23 PROCEDURE — 82330 ASSAY OF CALCIUM: CPT | Performed by: HOSPITALIST

## 2025-05-23 PROCEDURE — 97116 GAIT TRAINING THERAPY: CPT | Mod: GP | Performed by: PHYSICAL THERAPIST

## 2025-05-23 PROCEDURE — 250N000012 HC RX MED GY IP 250 OP 636 PS 637: Performed by: HOSPITALIST

## 2025-05-23 PROCEDURE — 82435 ASSAY OF BLOOD CHLORIDE: CPT | Performed by: HOSPITALIST

## 2025-05-23 RX ORDER — LOSARTAN POTASSIUM 100 MG/1
100 TABLET ORAL DAILY
Status: DISCONTINUED | OUTPATIENT
Start: 2025-05-23 | End: 2025-05-24 | Stop reason: HOSPADM

## 2025-05-23 RX ORDER — ATORVASTATIN CALCIUM 20 MG/1
20 TABLET, FILM COATED ORAL DAILY
Status: DISCONTINUED | OUTPATIENT
Start: 2025-05-23 | End: 2025-05-24 | Stop reason: HOSPADM

## 2025-05-23 RX ORDER — AMLODIPINE BESYLATE 10 MG/1
10 TABLET ORAL DAILY
Status: DISCONTINUED | OUTPATIENT
Start: 2025-05-23 | End: 2025-05-24 | Stop reason: HOSPADM

## 2025-05-23 RX ORDER — MEMANTINE HYDROCHLORIDE 10 MG/1
10 TABLET ORAL DAILY
Status: DISCONTINUED | OUTPATIENT
Start: 2025-05-23 | End: 2025-05-24 | Stop reason: HOSPADM

## 2025-05-23 RX ORDER — LEVOTHYROXINE SODIUM 75 UG/1
75 TABLET ORAL DAILY
Status: DISCONTINUED | OUTPATIENT
Start: 2025-05-23 | End: 2025-05-24 | Stop reason: HOSPADM

## 2025-05-23 RX ADMIN — PIPERACILLIN AND TAZOBACTAM 3.38 G: 3; .375 INJECTION, POWDER, FOR SOLUTION INTRAVENOUS at 04:41

## 2025-05-23 RX ADMIN — LOSARTAN POTASSIUM 100 MG: 100 TABLET, FILM COATED ORAL at 12:42

## 2025-05-23 RX ADMIN — PIPERACILLIN AND TAZOBACTAM 3.38 G: 3; .375 INJECTION, POWDER, FOR SOLUTION INTRAVENOUS at 10:20

## 2025-05-23 RX ADMIN — INSULIN ASPART 3 UNITS: 100 INJECTION, SOLUTION INTRAVENOUS; SUBCUTANEOUS at 12:43

## 2025-05-23 RX ADMIN — INSULIN ASPART 1 UNITS: 100 INJECTION, SOLUTION INTRAVENOUS; SUBCUTANEOUS at 07:53

## 2025-05-23 RX ADMIN — SODIUM CHLORIDE: 9 INJECTION, SOLUTION INTRAVENOUS at 10:20

## 2025-05-23 RX ADMIN — ATORVASTATIN CALCIUM 20 MG: 20 TABLET, FILM COATED ORAL at 12:42

## 2025-05-23 RX ADMIN — PIPERACILLIN AND TAZOBACTAM 3.38 G: 3; .375 INJECTION, POWDER, FOR SOLUTION INTRAVENOUS at 21:41

## 2025-05-23 RX ADMIN — PIPERACILLIN AND TAZOBACTAM 3.38 G: 3; .375 INJECTION, POWDER, FOR SOLUTION INTRAVENOUS at 17:00

## 2025-05-23 RX ADMIN — MEMANTINE 10 MG: 10 TABLET ORAL at 12:42

## 2025-05-23 RX ADMIN — AMLODIPINE BESYLATE 10 MG: 10 TABLET ORAL at 12:42

## 2025-05-23 RX ADMIN — INSULIN ASPART 2 UNITS: 100 INJECTION, SOLUTION INTRAVENOUS; SUBCUTANEOUS at 17:00

## 2025-05-23 RX ADMIN — LEVOTHYROXINE SODIUM 75 MCG: 75 TABLET ORAL at 12:42

## 2025-05-23 ASSESSMENT — ACTIVITIES OF DAILY LIVING (ADL)
ADLS_ACUITY_SCORE: 41
ADLS_ACUITY_SCORE: 32
ADLS_ACUITY_SCORE: 37
ADLS_ACUITY_SCORE: 29
ADLS_ACUITY_SCORE: 41
ADLS_ACUITY_SCORE: 31
ADLS_ACUITY_SCORE: 41
ADLS_ACUITY_SCORE: 37
ADLS_ACUITY_SCORE: 26
ADLS_ACUITY_SCORE: 41
ADLS_ACUITY_SCORE: 32
ADLS_ACUITY_SCORE: 29
ADLS_ACUITY_SCORE: 31
ADLS_ACUITY_SCORE: 41
ADLS_ACUITY_SCORE: 41
ADLS_ACUITY_SCORE: 29
ADLS_ACUITY_SCORE: 37
ADLS_ACUITY_SCORE: 29
ADLS_ACUITY_SCORE: 41

## 2025-05-23 NOTE — PROGRESS NOTES
RECEIVING UNIT ED HANDOFF REVIEW    ED Nurse Handoff Report was reviewed by: Lynette Malin RN on May 22, 2025 at 9:46 PM

## 2025-05-23 NOTE — H&P
[facility]  History and Physical   Hospitalist  Jerry Ryan MD       Gregoria Gan MRN# [id number]   YOB: 1945 Age: 80 year old      Date of Admission:  5/22/2025         Assessment and Plan:   Gregoria Gan is a 80 year old Tamaz[place] speaking female with PMH significant for moderate dementia, diabetes, hypertension, dyslipidemia, hypothyroidism, hyperparathyroidism , CKD stage III, h/o pulmonary nodules who was brought to ED for generalized weakness, admitted inpatient 5/22/25.    Per her daughter present by bedside she was feeling very fatigued today and was too weak to get off the toilet seat. She denies any fever, chills, rigors, chest pain or shortness of breath. Denies any cough. No abdominal pain and denies any urinary symptoms as well. She is usually constipated but had an episode of diarrhea today.    Generalized weakness  lactic acidosis, likely early sepsis with unclear etiology  chronic hypercalcemia related to hyperparathyroidism  CKD stage III  -presentation as noted above  -initial workup with BUN 29.6, creatinine 1.37 (stable around baseline)  -lactic acid 3.8----> 3.6; leukocytosis with WBC 16  -blood cultures pending; COVID/influenza/RSV negative  -CT chest PE protocol noted no PE; did note mild lower lung predominant bronchiectasis. Reticular interstitial opacities in the lower are again seen and likely represent fibrosis. Pulmonary nodules have not changed.     -Will admit as inpatient  -Unclear etiology for generalized weakness; CT head and UA has been ordered from ED which is pending  -clinically does not appear septic, however, has elevated lactic acid with marked leukocytosis as noted; was given a dose of IV Zosyn in ED along with 1500 ml fluid bolus  -will continue with empiric antibiotics for now and will follow blood culture and UA  -will start NS at 100 ML per hour  -will send stool for enteric/virus panel; no fever or abdominal discomfort  and had only one episode of diarrhea, so low probability for C. diff  -fall precautions  -PT/OT evaluation  -she has been noted with hypercalcemia and per PCP note patient/family not interested in any surgical approach to hyperparathyroidism    Type 2 diabetes mellitus with microalbuminuria, with long-term current use of insulin (H)  - blood glucose elevated 274 but negative ketones, normal anion gap  - will resume PTA insulin NPH 25 units daily (before lunch) when verified by pharmacy  -moderate resistance sliding scale insulin  - hypoglycemia protocol    Hypothyroidism  -will resume PTA Synthroid when verified by pharmacy  -will repeat TFTs    Hypertension  Dyslipidemia  -will resume PTA amlodipine and losartan along with Lipitor when verified by pharmacy    Moderate dementia  -lives with her son  -fall precautions and PT/OT as above  -resume PTA Namenda when verified      Clinically Significant Risk Factors Present on Admission         # Hyponatremia: Lowest Na = 134 mmol/L in last 2 days, will monitor as appropriate    # Hypercalcemia: Highest Ca = 11.5 mg/dL in last 2 days, will monitor as appropriate          # Hypertension: Noted on problem list         # Dementia: noted on problem list        # DMII: A1C = 9.0 % (Ref range: 0.0 - 5.6 %) within past 6 months                    DVT prophylaxis: mechanical with PCD boots  Code status: full code    Disposition:   Medically Ready for Discharge: Anticipated in 2-4 Days       Care plan discussed with ED provider, patient and her daughter present in room           Primary Care Physician:   Braxton Boone [phone]         Chief Complaint:     Generalized weakness    History is obtained from the patient with the help of her daughter interpreting for patient in [place] Leonean         History of Present Illness:     Gregoria Gan is a 80 year old [place]an speaking female with PMH significant for moderate dementia, diabetes, hypertension, dyslipidemia,  hypothyroidism, hyperparathyroidism , CKD stage III, h/o pulmonary nodules who was brought to ED for generalized weakness, admitted inpatient 5/22/25.    Per her daughter present by bedside she was feeling very fatigued today and was too weak to get off the toilet seat. She denies any fever, chills, rigors, chest pain or shortness of breath. Denies any cough. No abdominal pain and denies any urinary symptoms as well. She is usually constipated but had an episode of diarrhea today.    -initial workup with BUN 29.6, creatinine 1.37 (stable around baseline)  -lactic acid 3.8----> 3.6; leukocytosis with WBC 16  -blood cultures pending; COVID/influenza/RSV negative  -CT chest PE protocol noted no PE; did note mild lower lung predominant bronchiectasis. Reticular interstitial opacities in the lower are again seen and likely represent fibrosis. Pulmonary nodules have not changed.   - CT head and UA has been ordered from ED which is pending  -clinically does not appear septic, however, has elevated lactic acid with marked leukocytosis as noted; was given a dose of IV Zosyn in ED along with 1500 ml fluid bolus    In ED patient was seen by Dr Delgado and hospitalist was requested admission for further evaluation.    The patient denies any fever, chills, rigors, chest pain or shortness of breath.  Denies pain abdomen.  No bladder disturbances.           Past Medical History:     moderate dementia  Diabetes  Hypertension  Dyslipidemia  Hypothyroidism  Hyperparathyroidism  CKD stage III  h/o pulmonary nodules           Past Surgical History:     Past Surgical History:   Procedure Laterality Date    [facility] NONSPECIFIC PROCEDURE  1996    left side saliva gland removal    [facility] NONSPECIFIC PROCEDURE  2006    breast augmentation removal    [facility] NONSPECIFIC PROCEDURE  1977    breast augmentation              Home Medications:     Prior to Admission Medications   Prescriptions Last Dose Informant Patient Reported? Taking?   Continuous Glucose  Sensor (FREESTYLE TESS 3 PLUS SENSOR) MISC   No No   Sig: Use 1 sensor every 15 days. Use to read blood sugars per 's instructions.   Menaquinone-7 (VITAMIN K2) 100 MCG CAPS   Yes No   Sig: Take 100 mg by mouth every other day OTC patient reported.   Probiotic Product (PROBIOTIC DAILY) CAPS   Yes No   Sig: Take 1 each by mouth daily OT patient reported.   amLODIPine (NORVASC) 10 MG tablet   No No   Sig: Take 1 tablet (10 mg) by mouth daily.   atorvastatin (LIPITOR) 20 MG tablet   No No   Sig: Take 1 tablet (20 mg) by mouth daily.   cyanocobalamin (VITAMIN B-12) 1000 MCG tablet   Yes No   Sig: Take 1 tablet (1,000 mcg) by mouth daily   insulin NPH (HUMULIN N KWIKPEN) 100 UNIT/ML injection   No No   Sig: Inject 25 Units subcutaneously daily. Before lunch   insulin pen needle (ULTICARE PEN NEEDLES) 31G X 5 MM miscellaneous   No No   Sig: USE 1 PEN NEEDLE DAILY AS DIRECTED   levothyroxine (SYNTHROID/LEVOTHROID) 75 MCG tablet   No No   Sig: Take 1 tablet (75 mcg) by mouth daily.   losartan (COZAAR) 100 MG tablet   No No   Sig: Take 1 tablet (100 mg) by mouth daily.   magnesium 250 MG tablet   Yes No   Sig: Take 1 tablet by mouth daily.   memantine (NAMENDA) 5 MG tablet   No No   Sig: Take 2 tablets (10 mg) by mouth daily.   metFORMIN (GLUCOPHAGE XR) 500 MG 24 hr tablet   No No   Sig: Take 1 tablet (500 mg) by mouth daily (with dinner)      Facility-Administered Medications: None            Allergies:   No Known Allergies         Social History:   Gregoria Gan  reports that she has never smoked. She has never used smokeless tobacco. She reports that she does not drink alcohol and does not use drugs.              Family History:   Gregoria Gan family history includes Diabetes in her mother and sister; Heart Disease in her paternal grandfather; Hypertension in her mother and sister; Lipids in her mother and sister.    Family history was reviewed by myself and not pertinent to current presentation.            Review of Systems:   A10 point Review of Systems was done and were negative other than noted in the HPI.             Physical Exam:   Blood pressure 122/61, pulse 58, temperature 97.7  F (36.5  C), temperature source Oral, resp. rate 16, SpO2 95%, not currently breastfeeding.  0 lbs 0 oz        Constitutional: Alert, awake and oriented X 2 ; confused to dates at baseline; lying comfortably in bed in no apparent distress   HEENT: Pupils equal and reactive to light and accomodation, EOMI intact; neck supple no raised JVD or rigidity    Oral cavity: Moist mucosa   Cardiovascular: Normal s1 s2, regular rate and rhythm, no murmur   Lungs: B/l clear to auscultation, no wheezes or crepitations   Abdomen: Soft, nt, nd, no guarding, rigidity or rebound; BS +   LE : No edema   Musculoskeletal: Power 5/5 in all extremities   Neuro: No focal neurological deficits noted, CN II to XII grossly intact   Psychiatry: normal mood and affect  Skin: No obvious skin rashes or ulcers             Data:   All new lab and imaging data was reviewed in Epic.   Significant labs and imagings include:    CMP notable for BUN 29.6, creatinine 1.37; normal LFTs  calcium 11.5  glucose 274  lactic acid 3.8----3.6  CBC with WBC 16  UA and head CT ordered from ED and pending  blood cultures pending  COVID/influenza/RSV negative  CT chest PE protocol:  IMPRESSION:  1.  No PE.  2.  Mild lower lung predominant bronchiectasis. Reticular interstitial opacities in the lower are again seen and likely represent fibrosis.  3.  Pulmonary nodules have not changed.   4.  Soft tissue and calcifications in the anterior chest wall have not changed.             Jerry Ryan MD  Hospitalist

## 2025-05-23 NOTE — PROVIDER NOTIFICATION
MD Notification    Notified Person: MD    Notified Person Name: Pauly Gutierresimermono MD    Notification Date/Time: 5/23/35, 0453    Notification Interaction: [facility] messaging    Purpose of Notification: Lactic acid in ED was 3.6. Recheck just now is 2.3. Please advise. Thank you      Orders Received: No new orders    Comments:

## 2025-05-23 NOTE — PHARMACY-ADMISSION MEDICATION HISTORY
Pharmacist Admission Medication History    Admission medication history is complete. The information provided in this note is only as accurate as the sources available at the time of the update.    Information Source(s): Family member and CareEverywhere/SureScripts via in-person    Changes made to PTA medication list:  Added: None  Deleted: Magnesium  Changed: None    Allergies reviewed with patient and updates made in EHR: no    Medication History Completed By: Conchita Yee [facility] 5/22/2025 8:54 PM    PTA Med List   Medication Sig Last Dose/Taking    amLODIPine (NORVASC) 10 MG tablet Take 1 tablet (10 mg) by mouth daily. 5/22/2025    atorvastatin (LIPITOR) 20 MG tablet Take 1 tablet (20 mg) by mouth daily. 5/22/2025    cyanocobalamin (VITAMIN B-12) 1000 MCG tablet Take 1 tablet (1,000 mcg) by mouth daily 5/22/2025    insulin NPH (HUMULIN N KWIKPEN) 100 UNIT/ML injection Inject 25 Units subcutaneously daily. Before lunch 5/22/2025    levothyroxine (SYNTHROID/LEVOTHROID) 75 MCG tablet Take 1 tablet (75 mcg) by mouth daily. 5/22/2025    losartan (COZAAR) 100 MG tablet Take 1 tablet (100 mg) by mouth daily. 5/22/2025    memantine (NAMENDA) 5 MG tablet Take 2 tablets (10 mg) by mouth daily. 5/22/2025    Menaquinone-7 (VITAMIN K2) 100 MCG CAPS Take 100 mg by mouth every other day OTC patient reported. 5/22/2025    metFORMIN (GLUCOPHAGE XR) 500 MG 24 hr tablet Take 1 tablet (500 mg) by mouth daily (with dinner) 5/21/2025    Probiotic Product (PROBIOTIC DAILY) CAPS Take 1 each by mouth daily OTC patient reported. 5/22/2025

## 2025-05-23 NOTE — PROGRESS NOTES
[facility]  Hospitalist Progress Note        Jerry Ryan MD   05/23/2025        Interval History:        Patient seen and examined with her  present in room and interpreting for patient in [place]ian     - Afebrile; lactate improved 3.8---> 2.3; WBC 16---> 14  - UA from 5/22 unremarkable; blood cultures with no growth so far  - creatinine improving 1.37---> 1.15; calcium 11.5---> 10.3  - reports clinically feeling better and denies any active complaints  - per report, daughter had noted some blood in bowel movement morning of 5/23; nursing later noted no bloody stool; no clinical concern for active bleed at this time and Hb seems stable 12.3---13.1  - no further loose stools; will discontinue stool studies  - awaiting PT evaluation         Assessment and Plan:        Gregoria Gan is a 80 year old [place]ian speaking female with PMH significant for moderate dementia, diabetes, hypertension, dyslipidemia, hypothyroidism, hyperparathyroidism , CKD stage III, h/o pulmonary nodules who was brought to ED for generalized weakness, admitted inpatient 5/22/25.     Per her daughter she was feeling very fatigued on the day of presentation and was too weak to get off the toilet seat. She denied any fever, chills, rigors, chest pain or shortness of breath. Denied any cough. No abdominal pain and denies any urinary symptoms as well. She is usually constipated but had an episode of diarrhea on the day of presentation.     Generalized weakness  lactic acidosis, likely early sepsis with unclear etiology  chronic hypercalcemia related to hyperparathyroidism  CKD stage III  -presentation as noted above  -initial workup with BUN 29.6, creatinine 1.37 (stable around baseline)  -lactic acid 3.8----> 3.6; leukocytosis with WBC 16  -blood cultures 5/22 with no growth so far; COVID/influenza/RSV negative  -CT chest PE protocol noted no PE; did note mild lower lung predominant bronchiectasis. Reticular  interstitial opacities in the lower are again seen and likely represent fibrosis. Pulmonary nodules have not changed.      -Unclear etiology for generalized weakness; CT head and UA unremarkable as well   -clinically does not appear septic, however, had elevated lactic acid with marked leukocytosis as noted; was given a dose of IV Zosyn in ED along with 1500 ml fluid bolus  - Afebrile; lactate improved 3.8---> 2.3; WBC 16---> 14  -will continue with empiric Zosyn for now-- can probably discontinue on 5/24 if cultures remains negative  -will continue NS at 100 ML per hour  - creatinine improving 1.37---> 1.15; calcium 11.5---> 10.3  - no further loose stools; will discontinue stool studies  - PT evaluation-- rec home with assist  - fall precautions  - she has been noted with hypercalcemia and per PCP note patient/family not interested in any surgical approach to hyperparathyroidism     Type 2 diabetes mellitus with microalbuminuria, with long-term current use of insulin (H)  - blood glucose elevated 274 on admission but negative ketones, normal anion gap  - PTA on Insulin NPH 25 units daily (before lunch)  - BG currently in 160s without NPH  - will resume NPH at 10 units daily and can up titrate to PTA dose as needed  - moderate resistance sliding scale insulin  - hypoglycemia protocol    Concern for blood in stool  - per report, daughter had noted some blood in bowel movement morning of 5/23; nursing later noted no bloody stool; no clinical concern for active bleed at this time and Hb seems stable 12.3---13.1  -monitor clinically     Hypothyroidism  -will continue PTA Synthroid; repeat TFTs noted with elevated TSH at 6.47 but normal free T4     Hypertension  Dyslipidemia  -will continue PTA amlodipine and losartan with hold parameters along with Lipitor      Moderate dementia  -lives with her son  -fall precautions and PT/OT as above  -continue PTA Namenda     Diet: Moderate Consistent Carb (60 g CHO per Meal) Diet          DVT Prophylaxis: mechanical with PCD boots    Code status: full code    Disposition:   Medically Ready for Discharge: Anticipated Tomorrow pending clinical stability  -PT/OT consulted, rec home with assist    Care plan discussed with patient and her  present in room along with nursing       Clinically Significant Risk Factors Present on Admission         # Hyponatremia: Lowest Na = 134 mmol/L in last 2 days, will monitor as appropriate    # Hypercalcemia: Highest Ca = 11.5 mg/dL in last 2 days, will monitor as appropriate          # Hypertension: Noted on problem list         # Dementia: noted on problem list        # DMII: A1C = 9.0 % (Ref range: 0.0 - 5.6 %) within past 6 months                                Physical Exam:      Blood pressure (!) 144/71, pulse 76, temperature 97  F (36.1  C), temperature source Oral, resp. rate 16, SpO2 98%, not currently breastfeeding.  There were no vitals filed for this visit.  Vital Signs with Ranges  Temp:  [97  F (36.1  C)-98.7  F (37.1  C)] 97  F (36.1  C)  Pulse:  [57-76] 76  Resp:  [16-18] 16  BP: (122-154)/(58-71) 144/71  SpO2:  [94 %-98 %] 98 %  I/O's Last 24 hours  I/O last 3 completed shifts:  In: -   Out: 700 [Urine:700]    Constitutional: Alert, awake and oriented X 2 ; confused to dates at baseline; lying comfortably in bed in no apparent distress   HEENT: Pupils equal and reactive to light and accomodation, EOMI intact; neck supple no raised JVD or rigidity    Oral cavity: Moist mucosa   Cardiovascular: Normal s1 s2, regular rate and rhythm, no murmur   Lungs: B/l clear to auscultation, no wheezes or crepitations   Abdomen: Soft, nt, nd, no guarding, rigidity or rebound; BS +   LE : No edema   Musculoskeletal: Power 5/5 in all extremities   Neuro: No focal neurological deficits noted, CN II to XII grossly intact   Psychiatry: normal mood and affect  Skin: No obvious skin rashes or ulcers                Medications:        Current  Facility-Administered Medications   Medication Dose Route Frequency Provider Last Rate Last Admin    insulin aspart (NovoLOG) injection (RAPID ACTING)  1-7 Units Subcutaneous TID AC Jerry Ryan MD   1 Units at 05/23/25 0753    insulin aspart (NovoLOG) injection (RAPID ACTING)  1-5 Units Subcutaneous At Bedtime Jerry Ryan MD   1 Units at 05/22/25 2310    piperacillin-tazobactam (ZOSYN) 3.375 g vial to attach to  mL bag  3.375 g Intravenous Q6H Jerry Ryan MD   3.375 g at 05/23/25 0441    sodium chloride (PF) 0.9% PF flush 3 mL  3 mL Intracatheter Q8H Jerry Ryan MD   3 mL at 05/22/25 1647    sodium chloride (PF) 0.9% PF flush 3 mL  3 mL Intracatheter Q8H [facility] Jerry Ryan MD   3 mL at 05/22/25 2250     PRN Meds:   Current Facility-Administered Medications   Medication Dose Route Frequency Provider Last Rate Last Admin    acetaminophen (TYLENOL) tablet 650 mg  650 mg Oral Q4H PRN Jerry Ryan MD        Or    acetaminophen (TYLENOL) Suppository 650 mg  650 mg Rectal Q4H PRN Jerry Ryan MD        calcium carbonate (TUMS) chewable tablet 1,000 mg  1,000 mg Oral 4x Daily PRN Jerry Ryan MD        glucose gel 15-30 g  15-30 g Oral Q15 Min PRN Jerry Ryan MD        Or    dextrose 50 % injection 25-50 mL  25-50 mL Intravenous Q15 Min PRN Jerry Ryan MD        Or    glucagon injection 1 mg  1 mg Subcutaneous Q15 Min PRN Jerry Ryan MD        hydrALAZINE (APRESOLINE) injection 10 mg  10 mg Intravenous Q4H PRN Jerry Ryan MD        lidocaine (LMX4) cream   Topical Q1H PRN Jerry Ryan MD        lidocaine 1 % 0.1-1 mL  0.1-1 mL Other Q1H PRN Jerry Ryan MD        ondansetron (ZOFRAN ODT) ODT tab 4 mg  4 mg Oral Q6H PRN Jerry Ryan MD        Or    ondansetron (ZOFRAN) injection 4 mg  4 mg Intravenous Q6H PRN Jerry Ryan MD        sodium chloride (PF) 0.9% PF flush 3 mL   "3 mL Intracatheter q1 min prJerry Ordaz MD        sodium chloride (PF) 0.9% PF flush 3 mL  3 mL Intracatheter q1 min prJerry Ordaz MD                Data:      All new lab and imaging data was reviewed.   Recent Labs   Lab Test 05/23/25  0422 05/22/25  1436 12/26/24  1316   WBC 14.0* 16.0* 7.1   HGB 13.1 12.3 13.2   MCV 89 89 88    268 240      Recent Labs   Lab Test 05/23/25  0422 05/23/25  0153 05/22/25  2308 05/22/25  1436 05/06/25  1521 03/11/25  1451     --   --  134*  --  140   POTASSIUM 4.1  --   --  4.4  --  4.4   CHLORIDE 107  --   --  101  --  107   CO2 21*  --   --  20*  --  24   BUN 19.9  --   --  29.6*  --  22.5   CR 1.15*  --   --  1.37*  --  1.36*   ANIONGAP 12  --   --  13  --  9   KELTON 10.3  --   --  11.5* 11.6* 11.8*   * 161* 204* 274*  --  254*     No lab results found.    Invalid input(s): \"TROP\", \"TROPONINIES\"      "

## 2025-05-23 NOTE — PLAN OF CARE
Summary:  Presented with generalized weakness ans fatigue     DATE & TIME: 5/22/25, 4935 - 0388    Cognitive Concerns/ Orientation : Alert and oriented x self only. Dutch speaking. Daughter at bedside and helps with interpretation. Daughter stated unsuccessful use of Jabber in ED as patient is too confused and  could not understand patient   BEHAVIOR & AGGRESSION TOOL COLOR: Green  CIWA SCORE: NA   ABNL VS/O2: BP elevated. Other VSS on room air  MOBILITY: Assist x 1 with gait belt. Gait steady  PAIN MANAGMENT: Denied  DIET: Mod carb  BOWEL/BLADDER: Came to unit with external catheter but patient restless due to same and wanted it out. Same was removed. Up to bathroom to void. No BM  ABNL LAB/BG: Lactic acid 2.3. MD updated but no new orders. Calcium 5.3  DRAIN/DEVICES: PIV infusing at 100 ml/hr  TELEMETRY RHYTHM: NA  SKIN: Intact  TESTS/PROCEDURES: NA  D/C DAY/GOALS/PLACE: TBD  OTHER IMPORTANT INFO: Stool needed for Enteric bacteria and virus panel. Enteric precaution maintained. Daughter at bedside overnight    Goal Outcome Evaluation:      Plan of Care Reviewed With: patient    Overall Patient Progress: no changeOverall Patient Progress: no change

## 2025-05-23 NOTE — PROGRESS NOTES
May 23, 2025  Shift:[id number]  Gregoria Gan  80 year old  YOB: 1945    Reason for admission:Lactic acidosis [E87.20]  Altered mental status, unspecified altered mental status type [R41.82]  Leukocytosis, unspecified type [D72.829]  Generalized weakness [R53.1]    Cognition/Mentation:AxO to self and place. Hx of dementia.   VS:VSS on RA  GI:Continent. Up to bathroom. BMx3  :Continent. Up to BR.  Pulmonary:LS clear and equal  Pain:Denies   Drains/Lines:PIV infusing NS @ 100ml/hr.  Skin: intact.   Activity:SBA with GB  Diet:Mod carb. Eating food from home.  Therapies recs:Home.  Discharge:Pending     Shift summary: Family at bedside to help with cares and interpretation. No complaints of pain or abdominal discomfort. 2 Bms were normal. Bm this evening was bloody. MD notified.

## 2025-05-23 NOTE — PROGRESS NOTES
05/23/25 1100   Appointment Info   Signing Clinician's Name / Credentials (PT) Lakhwinder Cr DPT       Present yes   Language Cypriot   Living Environment   People in Home child(isi), adult;spouse   Current Living Arrangements house   Home Accessibility no concerns   Transportation Anticipated family or friend will provide   Living Environment Comments Pt's spouse answering questions as pt with history of dementia. Pt lives in a house with spouse and son. No stairs. Pt's family present to assist pt as needed.   Self-Care   Usual Activity Tolerance good   Current Activity Tolerance moderate   Regular Exercise No   Equipment Currently Used at Home none   Fall history within last six months no   Activity/Exercise/Self-Care Comment Pt's spouse reports pt can dress and bathe self, receives assist with other ADLs. Pt intermittently uses a FWW per spouse.   General Information   Onset of Illness/Injury or Date of Surgery 05/23/25   Referring Physician Jerry Ryan MD   Patient/Family Therapy Goals Statement (PT) Pt did not state   Pertinent History of Current Problem (include personal factors and/or comorbidities that impact the POC) Per Chart: Gregoria Gan is a 80 year old Cypriot speaking female with PMH significant for moderate dementia, diabetes, hypertension, dyslipidemia, hypothyroidism, hyperparathyroidism , CKD stage III, h/o pulmonary nodules who was brought to ED for generalized weakness, admitted inpatient 5/22/25.   Existing Precautions/Restrictions fall   Weight-Bearing Status - LLE full weight-bearing   Weight-Bearing Status - RLE full weight-bearing   Cognition   Cognitive Status Comments Pt aware she is in the hospital, unable to state month or year   Pain Assessment   Patient Currently in Pain No   Integumentary/Edema   Integumentary/Edema no deficits were identifed   Posture    Posture Protracted shoulders;Forward head position   Range of Motion (ROM)   Range  of Motion ROM is WFL   Strength (Manual Muscle Testing)   Strength (Manual Muscle Testing) Able to perform R SLR;Able to perform L SLR   Bed Mobility   Comment, (Bed Mobility) Supine>sit w/ SBA   Transfers   Comment, (Transfers) Sit>Stand w/ FWW and SBA   Gait/Stairs (Locomotion)   Maxwell Level (Gait) supervision   Assistive Device (Gait) walker, front-wheeled   Distance in Feet (Gait) 5'   Balance   Balance Comments Adequate static sitting balance; pt ambulates w/ a FWW   Sensory Examination   Sensory Perception patient reports no sensory changes   Clinical Impression   Criteria for Skilled Therapeutic Intervention Yes, treatment indicated   PT Diagnosis (PT) Impaired gait   Influenced by the following impairments Decreased balance   Functional limitations due to impairments Impaired functional mobility   Clinical Presentation (PT Evaluation Complexity) stable   Clinical Presentation Rationale Clinical judgement   Clinical Decision Making (Complexity) low complexity   Planned Therapy Interventions (PT) balance training;bed mobility training;gait training;patient/family education;strengthening;transfer training;progressive activity/exercise   Risk & Benefits of therapy have been explained evaluation/treatment results reviewed;care plan/treatment goals reviewed;risks/benefits reviewed;current/potential barriers reviewed;participants voiced agreement with care plan;participants included;patient;spouse/significant other   PT Total Evaluation Time   PT Eval, Low Complexity Minutes (31190) 10   Physical Therapy Goals   PT Frequency One time eval and treatment only   PT Predicted Duration/Target Date for Goal Attainment 05/26/25   PT Goals Bed Mobility;Transfers;Gait   PT: Bed Mobility Supervision/stand-by assist;Supine to/from sit;Goal Met   PT: Transfers Supervision/stand-by assist;Sit to/from stand;Assistive device;Goal Met   PT: Gait Supervision/stand-by assist;Assistive device;150 feet;Goal Met   Interventions    Interventions Quick Adds Gait Training;Therapeutic Activity   Therapeutic Activity   Therapeutic Activities: dynamic activities to improve functional performance Minutes (44350) 8   Symptoms Noted During/After Treatment None   Treatment Detail/Skilled Intervention Greeted pt supine in bed, agreed to PT. VSS on RA throughout session. Pt's spouse present and interpreting for pt. Pt with confusion, able to follow commands appropriately. Pt performed supine>sit w/ SBA. Once in sitting, pt able to scoot self to EOB and sit unsupported without LOB. Pt performed sit>stand x 5 w/ FWW and SBA, cues for hand placement. After ambulation, pt returned to supine in bed w/ SBA, demonstrating ability to safely lift BLEs back into bed and reposition self. Pt ended session supine in bed, with all needs met and call light within reach.   Gait Training   Gait Training Minutes (75615) 10   Symptoms Noted During/After Treatment (Gait Training) none   Treatment Detail/Skilled Intervention Pt ambulated w/ FWW and SBA. Pt ambulated with decreased gait speed, downward gaze, and FWW out too far in front of pt. Verbal cues for upright gaze and posture, FWW proximity and pacing. Repeated cues required as pt often reverts back to original gait pattern. Additional cues to increase hip flexion bilaterally for improved foot clearance. No LOB noted and pt tolerated activity well.   Distance in Feet 250'   PT Discharge Planning   PT Plan DC   PT Discharge Recommendation (DC Rec) home with assist   PT Rationale for DC Rec Pt is below baseline but is moving well. Pt currently performing all functional mobility w/ FWW and SBA. Anticipate at time of discharge pt will be able to safely return home with assist from family. Recommend pt use a FWW at discharge and pt's spouse reports they already have one.   PT Brief overview of current status Goals of therapy will be to address safe mobility and make recs for d/c to next level of care. Pt and RN will  continue to follow all falls risk precautions as documented by RN staff while hospitalized. SBA w/ FWW   PT Total Distance Amb During Session (feet) 250   Physical Therapy Time and Intention   Timed Code Treatment Minutes 18   Total Session Time (sum of timed and untimed services) 28       Physical Therapy Discharge Summary    Reason for therapy discharge:    All goals and outcomes met, no further needs identified.    Progress towards therapy goal(s). See goals on Care Plan in [facility] electronic [facility] record for goal details.  Goals met    Therapy recommendation(s):    Recommend pt continue to ambulate with nursing staff using a FWW throughout remainder of hospital stay. IPPT goals met.

## 2025-05-23 NOTE — PLAN OF CARE
Occupational Therapy: Orders received. Chart reviewed and discussed with care team.? Occupational Therapy not indicated as the patient is near baseline for ADLs. Spoke with the patient and spouse who indicate no concerns with managing ADLs, spouse will assist as needed as he does at baseline.? Defer discharge recommendations to care team.? Will complete orders.

## 2025-05-23 NOTE — ED NOTES
[facility]    ED Nurse Handoff Report    ED Chief complaint: Altered Mental Status      ED Diagnosis:   Final diagnoses:   Altered mental status, unspecified altered mental status type   Leukocytosis, unspecified type   Lactic acidosis       Code Status: Full Code    Allergies: No Known Allergies    Patient Story:  EMS called for altered mental status, generalized weakness. Speaks Pashto.  Today had diarrhea. Family on the way.  Diabetic, glucose 220. 20 g RAC. VSS, bradycardia is baseline per EMS.    Focused Assessment:        Labs Ordered and Resulted from Time of ED Arrival to Time of ED Departure   COMPREHENSIVE METABOLIC PANEL - Abnormal       Result Value    Sodium 134 (*)     Potassium 4.4      Carbon Dioxide (CO2) 20 (*)     Anion Gap 13      Urea Nitrogen 29.6 (*)     Creatinine 1.37 (*)     GFR Estimate 39 (*)     Calcium 11.5 (*)     Chloride 101      Glucose 274 (*)     Alkaline Phosphatase 132      AST 38      ALT 40      Protein Total 7.6      Albumin 3.5      Bilirubin Total 0.5     CBC WITH PLATELETS AND DIFFERENTIAL - Abnormal    WBC Count 16.0 (*)     RBC Count 4.10      Hemoglobin 12.3      Hematocrit 36.4      MCV 89      MCH 30.0      MCHC 33.8      RDW 13.1      Platelet Count 268      % Neutrophils 74      % Lymphocytes 19      % Monocytes 5      % Eosinophils 1      % Basophils 0      % Immature Granulocytes 1      NRBCs per 100 WBC 0      Absolute Neutrophils 11.8 (*)     Absolute Lymphocytes 3.0      Absolute Monocytes 0.8      Absolute Eosinophils 0.1      Absolute Basophils 0.0      Absolute Immature Granulocytes 0.2      Absolute NRBCs 0.0     LACTIC ACID WHOLE BLOOD WITH 1X REPEAT IN 2 HR WHEN >2 - Abnormal    Lactic Acid, Initial 3.8 (*)    ROUTINE UA WITH MICROSCOPIC REFLEX TO CULTURE - Abnormal    Color Urine Light Yellow      Appearance Urine Clear      Glucose Urine 100 (*)     Bilirubin Urine Negative      Ketones Urine Negative      Specific Gravity Urine  1.022      Blood Urine Negative      pH Urine 5.5      Protein Albumin Urine Negative      Urobilinogen Urine Normal      Nitrite Urine Negative      Leukocyte Esterase Urine Negative      Mucus Urine Present (*)     RBC Urine <1      WBC Urine 1     LACTIC ACID WHOLE BLOOD - Abnormal    Lactic Acid 3.6 (*)    MAGNESIUM - Normal    Magnesium 1.9     KETONE BETA-HYDROXYBUTYRATE QUANTITATIVE, RAPID - Normal    Ketone (Beta-Hydroxybutyrate) Quantitative <0.18     INFLUENZA A/B, RSV AND SARS-COV2 PCR - Normal    Influenza A PCR Negative      Influenza B PCR Negative      RSV PCR Negative      SARS CoV2 PCR Negative     IONIZED CALCIUM   BLOOD CULTURE   BLOOD CULTURE       CT Chest Pulmonary Embolism w Contrast   Final Result   IMPRESSION:   1.  No PE.   2.  Mild lower lung predominant bronchiectasis. Reticular interstitial opacities in the lower are again seen and likely represent fibrosis.   3.  Pulmonary nodules have not changed.    4.  Soft tissue and calcifications in the anterior chest wall have not changed.         Head CT w/o contrast    (Results Pending)         Treatments and/or interventions provided:      Medications   sodium chloride (PF) 0.9% PF flush 3 mL (has no administration in time range)   sodium chloride (PF) 0.9% PF flush 3 mL (3 mLs Intracatheter $Given 5/22/25 1647)   sodium chloride 0.9% BOLUS 500 mL (0 mLs Intravenous Stopped 5/22/25 1536)   piperacillin-tazobactam (ZOSYN) 4.5 g vial to attach to  mL bag (0 g Intravenous Stopped 5/22/25 1822)   iopamidol (ISOVUE-370) solution 54 mL (54 mLs Intravenous $Given 5/22/25 1847)   sodium chloride 0.9 % bag for CT scan flush (81 mLs Intravenous $Given 5/22/25 1847)   sodium chloride 0.9% BOLUS 1,000 mL (1,000 mLs Intravenous $New Bag 5/22/25 1943)       Patient's response to treatments and/or interventions:        To be done/followed up on inpatient unit:   See any in-patient orders    Does this patient have any cognitive concerns?: Alzheimer's  and Forgetful    Activity level - Baseline/Home:    Independent    Activity Level - Current:    Stand with assist x2    Patient's Preferred language: Namibian     Needed?: Yes    Isolation: None  Infection: Not Applicable  Patient tested for COVID 19 prior to admission: YES    Bariatric?: No    Vital Signs:   Vitals:    05/22/25 1500 05/22/25 1600 05/22/25 1700 05/22/25 1800   BP: 139/63 128/58 131/60 122/61   Pulse: 60 59 61 58   Resp:       Temp:       TempSrc:       SpO2: 97% 94% 94% 95%       Cardiac Rhythm:     Was the PSS-3 completed:   Yes  What interventions are required if any?               Family Comments: Family will be , patient is unable to hear the IPAD so family has been helping  OBS brochure/video discussed/provided to patient/family: N/A              Name of person given brochure if not patient:               Relationship to patient:   For the majority of the shift this patient's behavior was Green.  Behavioral interventions performed were .    ED NURSE PHONE NUMBER: [id number]

## 2025-05-24 VITALS
OXYGEN SATURATION: 94 % | TEMPERATURE: 97.9 F | SYSTOLIC BLOOD PRESSURE: 114 MMHG | DIASTOLIC BLOOD PRESSURE: 59 MMHG | RESPIRATION RATE: 16 BRPM | HEART RATE: 73 BPM

## 2025-05-24 LAB
GLUCOSE BLDC GLUCOMTR-MCNC: 168 MG/DL (ref 70–99)
GLUCOSE BLDC GLUCOMTR-MCNC: 180 MG/DL (ref 70–99)

## 2025-05-24 PROCEDURE — 99239 HOSP IP/OBS DSCHRG MGMT >30: CPT | Performed by: HOSPITALIST

## 2025-05-24 PROCEDURE — 250N000011 HC RX IP 250 OP 636: Performed by: HOSPITALIST

## 2025-05-24 PROCEDURE — 250N000013 HC RX MED GY IP 250 OP 250 PS 637: Performed by: HOSPITALIST

## 2025-05-24 RX ORDER — POLYETHYLENE GLYCOL 3350 17 G/17G
17 POWDER, FOR SOLUTION ORAL DAILY
Qty: 510 G | Refills: 0 | Status: SHIPPED | OUTPATIENT
Start: 2025-05-24 | End: 2025-06-23

## 2025-05-24 RX ORDER — SULFAMETHOXAZOLE AND TRIMETHOPRIM 800; 160 MG/1; MG/1
0.5 TABLET ORAL 2 TIMES DAILY
Qty: 4 TABLET | Refills: 0 | Status: SHIPPED | OUTPATIENT
Start: 2025-05-24 | End: 2025-05-28

## 2025-05-24 RX ORDER — CALCIUM CARBONATE 500 MG/1
1 TABLET, CHEWABLE ORAL 4 TIMES DAILY PRN
Qty: 30 TABLET | Refills: 0 | Status: SHIPPED | OUTPATIENT
Start: 2025-05-24

## 2025-05-24 RX ADMIN — AMLODIPINE BESYLATE 10 MG: 10 TABLET ORAL at 08:30

## 2025-05-24 RX ADMIN — MEMANTINE 10 MG: 10 TABLET ORAL at 08:30

## 2025-05-24 RX ADMIN — INSULIN ASPART 1 UNITS: 100 INJECTION, SOLUTION INTRAVENOUS; SUBCUTANEOUS at 08:31

## 2025-05-24 RX ADMIN — LOSARTAN POTASSIUM 100 MG: 100 TABLET, FILM COATED ORAL at 08:30

## 2025-05-24 RX ADMIN — PIPERACILLIN AND TAZOBACTAM 3.38 G: 3; .375 INJECTION, POWDER, FOR SOLUTION INTRAVENOUS at 05:03

## 2025-05-24 RX ADMIN — ATORVASTATIN CALCIUM 20 MG: 20 TABLET, FILM COATED ORAL at 08:30

## 2025-05-24 RX ADMIN — LEVOTHYROXINE SODIUM 75 MCG: 75 TABLET ORAL at 08:30

## 2025-05-24 ASSESSMENT — ACTIVITIES OF DAILY LIVING (ADL)
ADLS_ACUITY_SCORE: 41

## 2025-05-24 NOTE — DISCHARGE SUMMARY
[facility]  Hospitalist Discharge Summary      Date of Admission:  5/22/2025  Date of Discharge:  5/24/2025  Discharging Provider: Kem Rinaldi MD  Discharge Service: Hospitalist Service    Discharge Diagnoses   Patient Active Problem List   Diagnosis    Autoimmune hypothyroidism    Type 2 diabetes mellitus with microalbuminuria, without long-term current use of insulin (H)    Hyperlipidemia LDL goal <100    Insomnia    CKD (chronic kidney disease) stage 3, GFR 30-59 ml/min (H)    Benign hypertension with CKD (chronic kidney disease) stage III (H)    Osteopenia of multiple sites    Memory loss    Pulmonary nodules    Hyperparathyroidism    Moderate dementia without behavioral disturbance, psychotic disturbance, mood disturbance, or anxiety, unspecified dementia type (H)    Generalized muscle weakness    Lactic acidosis    Altered mental status, unspecified altered mental status type    Leukocytosis, unspecified type    Generalized weakness        Clinically Significant Risk Factors     # DMII: A1C = 9.0 % (Ref range: 0.0 - 5.6 %) within past 6 months       Follow-ups Needed After Discharge   Follow-up Appointments       Hospital Follow-up with Existing Primary Care Provider (PCP)          Schedule Primary Care visit within: 30 Days           Additional follow-up instructions/to-do's for PCP  for routine cares and hospital follow up    Unresulted Labs Ordered in the Past 30 Days of this Admission       Date and Time Order Name Status Description    5/22/2025  3:55 PM Blood Culture Peripheral blood (BC) Arm, Left Preliminary     5/22/2025  3:55 PM Blood Culture Peripheral blood (BC) Arm, Right Preliminary         These results will be followed up by PCP    Discharge Disposition   Discharged to home  Condition at discharge: Stable    Hospital Course   Please refer to the H&P note for further details of this presentation, in brief, Gregoria Gan is a 80 year old [place]  speaking female with PMH significant for moderate dementia, diabetes, hypertension, dyslipidemia, hypothyroidism, hyperparathyroidism , CKD stage III, h/o pulmonary nodules who was brought to ED for generalized weakness, admitted inpatient 5/22/25.     Per her daughter she was feeling very fatigued on the day of presentation and was too weak to get off the toilet seat. She denied any fever, chills, rigors, chest pain or shortness of breath. Denied any cough. No abdominal pain and denies any urinary symptoms as well. She is usually constipated but had an episode of diarrhea on the day of presentation.  Subsequently she was noted to to be having normal bowel movements.    Following distal esophageal problems during this stay.   Generalized weakness  lactic acidosis, likely early sepsis with unclear etiology  chronic hypercalcemia related to hyperparathyroidism  CKD stage III  -initial workup with BUN 29.6, creatinine 1.37 (stable around baseline)  -lactic acid 3.8----> 3.6; leukocytosis with WBC 16  -blood cultures 5/22 with no growth so far; COVID/influenza/RSV negative  -CT chest PE protocol noted no PE; did note mild lower lung predominant bronchiectasis. Reticular interstitial opacities in the lower are again seen and likely represent fibrosis. Pulmonary nodules have not changed.  Unclear etiology for generalized weakness; CT head and UA unremarkable as well   -clinically does not appear septic, however, had elevated lactic acid with marked leukocytosis as noted; was given a dose of IV Zosyn in ED along with 1500 ml fluid bolus  - Afebrile; lactate improved 3.8---> 2.3; WBC 16---> 14  -will continue with empiric Zosyn for now-- can probably discontinue on 5/24 if cultures remains negative  -will continue NS at 100 ML per hour  - creatinine improving 1.37---> 1.15; calcium 11.5---> 10.3  - no further loose stools; will discontinue stool studies  - PT evaluation-- rec home with assist  - fall precautions  - she has  been noted with hypercalcemia and per PCP note patient/family not interested in any surgical approach to hyperparathyroidism.  Due to her ongoing urinary symptoms despite negative cultures she was discharged with empiric course of single-stranded Bactrim for presumed urinary tract infection.  During the hospital stay she was managed intravenous Zosyn.     ype 2 diabetes mellitus with microalbuminuria, with long-term current use of insulin (H)  - blood glucose elevated 274 on admission but negative ketones, normal anion gap  - PTA on Insulin NPH 25 units daily (before lunch)  Her diabetes was managed with insulin regimen during the stay and discharged on her home regimen of insulin.       Concern for blood in stool  - per report, daughter had noted some blood in bowel movement morning of 5/23; nursing later noted no bloody stool; no clinical concern for active bleed at this time and Hb seems stable 12.3---13.1  On exam, the stool is no blood within the stool or around the stool.  However when she wipes tiny streaks of stool was noted on the tissue paper, hence coming from superficial anal area.  Patient was discharged with a bowel regimen and instructed to keep her bowel movements as regular as possible      Hypothyroidism  -will continue PTA Synthroid; repeat TFTs noted with elevated TSH at 6.47 but normal free T4     Hypertension  Dyslipidemia  -will continue PTA amlodipine and losartan with hold parameters along with Lipitor      Moderate dementia  -lives with her son  -fall precautions and PT/OT as above  -continue PTA Namenda       Consultations This Hospital Stay   PHYSICAL THERAPY ADULT IP CONSULT  OCCUPATIONAL THERAPY ADULT IP CONSULT    Code Status   Full Code    Time Spent on this Encounter   IKem MD, personally saw the patient today and spent less than or equal to 30 minutes discharging this patient.       Kem Rinaldi MD  [address]  SPECIALTY UNIT  [address] [place] [address]  Phone: [phone]  ______________________________________________________________________    Physical Exam   Vital Signs: Temp: 97.9  F (36.6  C) Temp src: Oral BP: 114/59 Pulse: 73   Resp: 16 SpO2: 94 % O2 Device: None (Room air)    Weight: 0 lbs 0 oz  General Appearance: Alert in NAD sitting up in the hospital bed,  and daughter at bedside  Respiratory: Lungs are CTA  and no wheezing  GI: Abdomen is soft NT/ND + BS         Primary Care Physician   Braxton Boone    Discharge Orders      Reason for your hospital stay    Suspected urinary tract infection an constipation     Activity    Your activity upon discharge: activity as tolerated     Diet    Follow this diet upon discharge: Current Diet:Orders Placed This Encounter      Moderate Consistent Carb (60 g CHO per Meal) Diet     Hospital Follow-up with Existing Primary Care Provider (PCP)            Significant Results and Procedures   Most Recent 3 CBC's:  Recent Labs   Lab Test 05/23/25  2218 05/23/25  1431 05/23/25  0422 05/22/25  1436 12/26/24  1316   WBC  --   --  14.0* 16.0* 7.1   HGB 11.5* 11.9 13.1 12.3 13.2   MCV 89 88 89 89 88   PLT  --   --  257 268 240       Discharge Medications   Current Discharge Medication List        START taking these medications    Details   calcium carbonate (TUMS) 500 MG chewable tablet Take 1 tablet (500 mg) by mouth 4 times daily as needed for heartburn.  Qty: 30 tablet, Refills: 0    Associated Diagnoses: Generalized weakness; Urinary tract infection without hematuria, site unspecified      polyethylene glycol (MIRALAX) 17 GM/Dose powder Take 17 g by mouth daily.  Qty: 510 g, Refills: 0    Associated Diagnoses: Generalized weakness; Urinary tract infection without hematuria, site unspecified      sulfamethoxazole-trimethoprim (BACTRIM DS) 800-160 MG tablet Take 0.5 tablets by mouth 2 times daily for 4 days.  Qty: 4 tablet, Refills: 0    Associated  Diagnoses: Generalized weakness; Urinary tract infection without hematuria, site unspecified           CONTINUE these medications which have NOT CHANGED    Details   amLODIPine (NORVASC) 10 MG tablet Take 1 tablet (10 mg) by mouth daily.  Qty: 90 tablet, Refills: 3    Associated Diagnoses: HTN (hypertension), benign      atorvastatin (LIPITOR) 20 MG tablet Take 1 tablet (20 mg) by mouth daily.  Qty: 90 tablet, Refills: 2    Associated Diagnoses: Hyperlipidemia LDL goal <100      cyanocobalamin (VITAMIN B-12) 1000 MCG tablet Take 1 tablet (1,000 mcg) by mouth daily    Comments: OTC patient reported.      insulin NPH (HUMULIN N KWIKPEN) 100 UNIT/ML injection Inject 25 Units subcutaneously daily. Before lunch  Qty: 6 mL, Refills: 3    Comments: New dose. PROFILE FOR FUTURE REFILLS  Associated Diagnoses: Type 2 diabetes mellitus with microalbuminuria, with long-term current use of insulin (H)      levothyroxine (SYNTHROID/LEVOTHROID) 75 MCG tablet Take 1 tablet (75 mcg) by mouth daily.  Qty: 90 tablet, Refills: 3    Comments: PROFILE FOR FUTURE REFILLS  Associated Diagnoses: Autoimmune hypothyroidism      losartan (COZAAR) 100 MG tablet Take 1 tablet (100 mg) by mouth daily.  Qty: 90 tablet, Refills: 3    Comments: PROFILE FOR FUTURE REFILLS  Associated Diagnoses: Benign hypertension with CKD (chronic kidney disease) stage III (H)      memantine (NAMENDA) 5 MG tablet Take 2 tablets (10 mg) by mouth daily.  Qty: 180 tablet, Refills: 2    Associated Diagnoses: Moderate dementia without behavioral disturbance, psychotic disturbance, mood disturbance, or anxiety, unspecified dementia type (H)      Menaquinone-7 (VITAMIN K2) 100 MCG CAPS Take 100 mg by mouth every other day OTC patient reported.      metFORMIN (GLUCOPHAGE XR) 500 MG 24 hr tablet Take 1 tablet (500 mg) by mouth daily (with dinner)  Qty: 90 tablet, Refills: 3    Associated Diagnoses: Type 2 diabetes mellitus with diabetic microalbuminuria, with long-term  current use of insulin (H)      Probiotic Product (PROBIOTIC DAILY) CAPS Take 1 each by mouth daily OTC patient reported.      Continuous Glucose Sensor (FREESTYLE TESS 3 PLUS SENSOR) MISC Use 1 sensor every 15 days. Use to read blood sugars per 's instructions.  Qty: 6 each, Refills: 3    Comments: Has  - not a new start.  Associated Diagnoses: Type 2 diabetes mellitus with microalbuminuria, with long-term current use of insulin (H)      insulin pen needle (ULTICARE PEN NEEDLES) 31G X 5 MM miscellaneous USE 1 PEN NEEDLE DAILY AS DIRECTED  Qty: 100 each, Refills: 2    Associated Diagnoses: Type 2 diabetes mellitus with microalbuminuria, with long-term current use of insulin (H)           Allergies   No Known Allergies

## 2025-05-24 NOTE — PROGRESS NOTES
Patient medically ready for discharge. Returning home with  and daughter. All discharge education, new medication teaching, and follow-up appointments discussed at the bedside with family.

## 2025-05-24 NOTE — PLAN OF CARE
Goal Outcome Evaluation:         Summary:  Presented with generalized weakness ans fatigue     DATE & TIME: 25-25 23:00-07:30     Cognitive Concerns/ Orientation : Alert and oriented x self and place. Hx of Dementia.  Spanish speaking. Daughter and bedside and helps with interpretation.   BEHAVIOR & AGGRESSION TOOL COLOR: Green  CIWA SCORE: NA   ABNL VS/O2: VSS on RA  MOBILITY: Assist x 1 with gait belt.  PAIN MANAGMENT: Denies  DIET: Mod carb  BOWEL/BLADDER:  Voiding spontaneously. Up to bathroom. No BM this shift  ABNL LAB/BG: B; Hgb 11.5  DRAIN/DEVICES: PIV SL  TELEMETRY RHYTHM: NA  SKIN: Intact  TESTS/PROCEDURES: none this shift  D/C DAY/GOALS/PLACE: Possible today. PT recommends home with assist.  OTHER IMPORTANT INFO: Daughter at bedside overnight. Continue IV Zosyn q6H

## 2025-05-24 NOTE — PLAN OF CARE
Orientation:  A&O to self. Hebrew speaking. Daughter at bedside.     Vitals/Tele: VSS RA, denies pain.     IV Access/drains: R PIV SL, intermittent abx.     Diet: MCHO    Mobility: A1/SBA.     GI/: Continent B&B. Had 1 soft BM this shift. Noted bright red blood on toilet paper.     Wound/Skin: WNL    Consults: PT/OT    Discharge Plan: Pending progress.       See Flow sheets for assessment

## 2025-05-27 LAB
BACTERIA SPEC CULT: NO GROWTH
BACTERIA SPEC CULT: NO GROWTH

## 2025-06-13 ENCOUNTER — ANCILLARY PROCEDURE (OUTPATIENT)
Dept: BONE DENSITY | Facility: CLINIC | Age: 80
End: 2025-06-13
Attending: INTERNAL MEDICINE
Payer: COMMERCIAL

## 2025-06-13 DIAGNOSIS — Z78.0 POSTMENOPAUSAL: ICD-10-CM

## 2025-06-13 PROCEDURE — 77080 DXA BONE DENSITY AXIAL: CPT | Mod: TC | Performed by: RADIOLOGY

## 2025-06-27 ENCOUNTER — RESULTS FOLLOW-UP (OUTPATIENT)
Dept: INTERNAL MEDICINE | Facility: CLINIC | Age: 80
End: 2025-06-27

## 2025-07-03 ENCOUNTER — OFFICE VISIT (OUTPATIENT)
Dept: INTERNAL MEDICINE | Facility: CLINIC | Age: 80
End: 2025-07-03
Payer: COMMERCIAL

## 2025-07-03 VITALS
HEART RATE: 70 BPM | HEIGHT: 60 IN | WEIGHT: 104.5 LBS | DIASTOLIC BLOOD PRESSURE: 67 MMHG | OXYGEN SATURATION: 95 % | BODY MASS INDEX: 20.52 KG/M2 | SYSTOLIC BLOOD PRESSURE: 117 MMHG | RESPIRATION RATE: 15 BRPM | TEMPERATURE: 98 F

## 2025-07-03 DIAGNOSIS — R80.9 TYPE 2 DIABETES MELLITUS WITH MICROALBUMINURIA, WITHOUT LONG-TERM CURRENT USE OF INSULIN (H): Primary | ICD-10-CM

## 2025-07-03 DIAGNOSIS — E83.52 HYPERCALCEMIA: ICD-10-CM

## 2025-07-03 DIAGNOSIS — E11.29 TYPE 2 DIABETES MELLITUS WITH MICROALBUMINURIA, WITHOUT LONG-TERM CURRENT USE OF INSULIN (H): Primary | ICD-10-CM

## 2025-07-03 DIAGNOSIS — R53.1 GENERALIZED WEAKNESS: ICD-10-CM

## 2025-07-03 LAB
ANION GAP SERPL CALCULATED.3IONS-SCNC: 12 MMOL/L (ref 7–15)
BUN SERPL-MCNC: 27.6 MG/DL (ref 8–23)
CALCIUM SERPL-MCNC: 11.4 MG/DL (ref 8.8–10.4)
CHLORIDE SERPL-SCNC: 108 MMOL/L (ref 98–107)
CREAT SERPL-MCNC: 1.12 MG/DL (ref 0.51–0.95)
EGFRCR SERPLBLD CKD-EPI 2021: 49 ML/MIN/1.73M2
EST. AVERAGE GLUCOSE BLD GHB EST-MCNC: 206 MG/DL
GLUCOSE SERPL-MCNC: 200 MG/DL (ref 70–99)
HBA1C MFR BLD: 8.8 % (ref 0–5.6)
HCO3 SERPL-SCNC: 19 MMOL/L (ref 22–29)
POTASSIUM SERPL-SCNC: 4.3 MMOL/L (ref 3.4–5.3)
SODIUM SERPL-SCNC: 139 MMOL/L (ref 135–145)

## 2025-07-03 NOTE — PROGRESS NOTES
Assessment & Plan     Type 2 diabetes mellitus with microalbuminuria, without long-term current use of insulin (H)  Evening hyperglycemia remains an issue - though attempts at improving this haven't worked well in the past or ended up causing more overnight hypoglycemia.  I'm happy with an A1c<8% for her- her evening meal has a lot more carbs then others where we aren't seening the spike in #. Asked her to cut rice back at dinner subbing some veggies there and see how this works overall.  - Basic metabolic panel  (Ca, Cl, CO2, Creat, Gluc, K, Na, BUN); Future  - Hemoglobin A1c; Future  - Basic metabolic panel  (Ca, Cl, CO2, Creat, Gluc, K, Na, BUN)  - Hemoglobin A1c    Generalized weakness  Hypercalcemia  -concerned its related to her Ca+  If GFR allows will look at getting her reclast- though since hospital stay she and  report no recurrence in these sx   - Basic metabolic panel  (Ca, Cl, CO2, Creat, Gluc, K, Na, BUN); Future  - Hemoglobin A1c; Future  - Basic metabolic panel  (Ca, Cl, CO2, Creat, Gluc, K, Na, BUN)  - Hemoglobin A1c    The longitudinal plan of care for the diagnosis(es)/condition(s) as documented were addressed during this visit. Due to the added complexity in care, I will continue to support maria teresa vera in the subsequent management and with ongoing continuity of care.    MED REC REQUIRED  Post Medication Reconciliation Status:  Discharge medications reconciled, continue medications without change        Subjective   maria teresa vera is a 80 year old, presenting for the following health issues:  Hospital F/U              HPI      She and her  state her symptoms have resolved - no further weakness.  Glucose # as above-       Hospital Follow-up Visit:    Hospital/Nursing Home/IP Rehab Facility: [facility]  Most Recent Admission Date: 5/22/2025   Most Recent Admission Diagnosis: Lactic acidosis - E87.20  Altered mental status, unspecified altered mental status type -  R41.82  Leukocytosis, unspecified type - D72.829  Generalized weakness - R53.1     Most Recent Discharge Date: 2025   Most Recent Discharge Diagnosis: Altered mental status, unspecified altered mental status type - R41.82  Leukocytosis, unspecified type - D72.829  Lactic acidosis - E87.20  Generalized weakness - R53.1  UTI of  - P39.3  Urinary tract infection without hematuria, site unspecified - N39.0   Do you have any other stressors you would like to discuss with your provider? No    Problems taking medications regularly:  None  Medication changes since discharge: None  Problems adhering to non-medication therapy:  None    Summary of hospitalization:  [facility] discharge summary reviewed  Diagnostic Tests/Treatments reviewed.  Follow up needed: none  Other Healthcare Providers Involved in Patient s Care:         None  Update since discharge: stable.         Plan of care communicated with patient and family                       Objective    /67   Pulse 70   Temp 98  F (36.7  C) (Temporal)   Resp 15   Ht 1.524 m (5')   Wt 47.4 kg (104 lb 8 oz)   LMP  (LMP Unknown)   SpO2 95%   BMI 20.41 kg/m    Body mass index is 20.41 kg/m .  Physical Exam   GENERAL: alert and no distress  RESP: lungs clear to auscultation - no rales, rhonchi or wheezes  CV: regular rate and rhythm, normal S1 S2, no S3 or S4, no murmur, click or rub, no peripheral edema  MS: no gross musculoskeletal defects noted, no edema            Signed Electronically by: Braxton Boone MD

## (undated) RX ORDER — DOBUTAMINE HYDROCHLORIDE 200 MG/100ML
INJECTION INTRAVENOUS
Status: DISPENSED
Start: 2025-01-17